# Patient Record
Sex: MALE | Race: OTHER | NOT HISPANIC OR LATINO | URBAN - METROPOLITAN AREA
[De-identification: names, ages, dates, MRNs, and addresses within clinical notes are randomized per-mention and may not be internally consistent; named-entity substitution may affect disease eponyms.]

---

## 2020-01-10 ENCOUNTER — INPATIENT (INPATIENT)
Facility: HOSPITAL | Age: 55
LOS: 7 days | Discharge: HOME | DRG: 234 | End: 2020-01-18
Attending: THORACIC SURGERY (CARDIOTHORACIC VASCULAR SURGERY) | Admitting: SPECIALIST
Payer: COMMERCIAL

## 2020-01-10 ENCOUNTER — TRANSCRIPTION ENCOUNTER (OUTPATIENT)
Age: 55
End: 2020-01-10

## 2020-01-10 VITALS — WEIGHT: 192.02 LBS

## 2020-01-10 DIAGNOSIS — Z98.890 OTHER SPECIFIED POSTPROCEDURAL STATES: Chronic | ICD-10-CM

## 2020-01-10 DIAGNOSIS — Z90.5 ACQUIRED ABSENCE OF KIDNEY: Chronic | ICD-10-CM

## 2020-01-10 DIAGNOSIS — I25.10 ATHEROSCLEROTIC HEART DISEASE OF NATIVE CORONARY ARTERY WITHOUT ANGINA PECTORIS: ICD-10-CM

## 2020-01-10 DIAGNOSIS — I20.0 UNSTABLE ANGINA: ICD-10-CM

## 2020-01-10 DIAGNOSIS — E78.5 HYPERLIPIDEMIA, UNSPECIFIED: ICD-10-CM

## 2020-01-10 DIAGNOSIS — I10 ESSENTIAL (PRIMARY) HYPERTENSION: ICD-10-CM

## 2020-01-10 DIAGNOSIS — I73.9 PERIPHERAL VASCULAR DISEASE, UNSPECIFIED: ICD-10-CM

## 2020-01-10 LAB
ALBUMIN SERPL ELPH-MCNC: 4.2 G/DL — SIGNIFICANT CHANGE UP (ref 3.3–5.2)
ALP SERPL-CCNC: 113 U/L — SIGNIFICANT CHANGE UP (ref 40–120)
ALT FLD-CCNC: 71 U/L — HIGH
ANION GAP SERPL CALC-SCNC: 12 MMOL/L — SIGNIFICANT CHANGE UP (ref 5–17)
ANION GAP SERPL CALC-SCNC: 12 MMOL/L — SIGNIFICANT CHANGE UP (ref 5–17)
APTT BLD: 26.8 SEC — LOW (ref 27.5–36.3)
APTT BLD: 34.7 SEC — SIGNIFICANT CHANGE UP (ref 27.5–36.3)
AST SERPL-CCNC: 42 U/L — HIGH
BASOPHILS # BLD AUTO: 0.06 K/UL — SIGNIFICANT CHANGE UP (ref 0–0.2)
BASOPHILS NFR BLD AUTO: 0.9 % — SIGNIFICANT CHANGE UP (ref 0–2)
BILIRUB DIRECT SERPL-MCNC: 0.1 MG/DL — SIGNIFICANT CHANGE UP (ref 0–0.3)
BILIRUB INDIRECT FLD-MCNC: 0.4 MG/DL — SIGNIFICANT CHANGE UP (ref 0.2–1)
BILIRUB SERPL-MCNC: 0.5 MG/DL — SIGNIFICANT CHANGE UP (ref 0.4–2)
BLD GP AB SCN SERPL QL: SIGNIFICANT CHANGE UP
BUN SERPL-MCNC: 10 MG/DL — SIGNIFICANT CHANGE UP (ref 8–20)
BUN SERPL-MCNC: 11 MG/DL — SIGNIFICANT CHANGE UP (ref 8–20)
CALCIUM SERPL-MCNC: 8.7 MG/DL — SIGNIFICANT CHANGE UP (ref 8.6–10.2)
CALCIUM SERPL-MCNC: 9.3 MG/DL — SIGNIFICANT CHANGE UP (ref 8.6–10.2)
CHLORIDE SERPL-SCNC: 102 MMOL/L — SIGNIFICANT CHANGE UP (ref 98–107)
CHLORIDE SERPL-SCNC: 102 MMOL/L — SIGNIFICANT CHANGE UP (ref 98–107)
CHOLEST SERPL-MCNC: 431 MG/DL — HIGH (ref 110–199)
CO2 SERPL-SCNC: 21 MMOL/L — LOW (ref 22–29)
CO2 SERPL-SCNC: 23 MMOL/L — SIGNIFICANT CHANGE UP (ref 22–29)
CREAT SERPL-MCNC: 0.99 MG/DL — SIGNIFICANT CHANGE UP (ref 0.5–1.3)
CREAT SERPL-MCNC: 1.17 MG/DL — SIGNIFICANT CHANGE UP (ref 0.5–1.3)
EOSINOPHIL # BLD AUTO: 0.07 K/UL — SIGNIFICANT CHANGE UP (ref 0–0.5)
EOSINOPHIL NFR BLD AUTO: 1.1 % — SIGNIFICANT CHANGE UP (ref 0–6)
GLUCOSE BLDC GLUCOMTR-MCNC: 115 MG/DL — HIGH (ref 70–99)
GLUCOSE SERPL-MCNC: 134 MG/DL — HIGH (ref 70–115)
GLUCOSE SERPL-MCNC: 164 MG/DL — HIGH (ref 70–115)
HBA1C BLD-MCNC: 8.6 % — HIGH (ref 4–5.6)
HCT VFR BLD CALC: 43.5 % — SIGNIFICANT CHANGE UP (ref 39–50)
HCT VFR BLD CALC: 45.5 % — SIGNIFICANT CHANGE UP (ref 39–50)
HDLC SERPL-MCNC: 33 MG/DL — LOW
HGB BLD-MCNC: 13.7 G/DL — SIGNIFICANT CHANGE UP (ref 13–17)
HGB BLD-MCNC: 13.9 G/DL — SIGNIFICANT CHANGE UP (ref 13–17)
HIV 1 & 2 AB SERPL IA.RAPID: SIGNIFICANT CHANGE UP
IMM GRANULOCYTES NFR BLD AUTO: 1.1 % — SIGNIFICANT CHANGE UP (ref 0–1.5)
INR BLD: 1 RATIO — SIGNIFICANT CHANGE UP (ref 0.88–1.16)
INR BLD: 1 RATIO — SIGNIFICANT CHANGE UP (ref 0.88–1.16)
LIPID PNL WITH DIRECT LDL SERPL: 342 MG/DL — SIGNIFICANT CHANGE UP
LYMPHOCYTES # BLD AUTO: 2.24 K/UL — SIGNIFICANT CHANGE UP (ref 1–3.3)
LYMPHOCYTES # BLD AUTO: 35.4 % — SIGNIFICANT CHANGE UP (ref 13–44)
MAGNESIUM SERPL-MCNC: 2 MG/DL — SIGNIFICANT CHANGE UP (ref 1.6–2.6)
MAGNESIUM SERPL-MCNC: 2.2 MG/DL — SIGNIFICANT CHANGE UP (ref 1.6–2.6)
MCHC RBC-ENTMCNC: 26.3 PG — LOW (ref 27–34)
MCHC RBC-ENTMCNC: 26.6 PG — LOW (ref 27–34)
MCHC RBC-ENTMCNC: 30.5 GM/DL — LOW (ref 32–36)
MCHC RBC-ENTMCNC: 31.5 GM/DL — LOW (ref 32–36)
MCV RBC AUTO: 84.3 FL — SIGNIFICANT CHANGE UP (ref 80–100)
MCV RBC AUTO: 86.2 FL — SIGNIFICANT CHANGE UP (ref 80–100)
MONOCYTES # BLD AUTO: 0.53 K/UL — SIGNIFICANT CHANGE UP (ref 0–0.9)
MONOCYTES NFR BLD AUTO: 8.4 % — SIGNIFICANT CHANGE UP (ref 2–14)
MRSA PCR RESULT.: SIGNIFICANT CHANGE UP
NEUTROPHILS # BLD AUTO: 3.36 K/UL — SIGNIFICANT CHANGE UP (ref 1.8–7.4)
NEUTROPHILS NFR BLD AUTO: 53.1 % — SIGNIFICANT CHANGE UP (ref 43–77)
NT-PROBNP SERPL-SCNC: 130 PG/ML — SIGNIFICANT CHANGE UP (ref 0–300)
PA ADP PRP-ACNC: 249 PRU — SIGNIFICANT CHANGE UP (ref 180–376)
PHOSPHATE SERPL-MCNC: 2.2 MG/DL — LOW (ref 2.4–4.7)
PLATELET # BLD AUTO: 273 K/UL — SIGNIFICANT CHANGE UP (ref 150–400)
PLATELET # BLD AUTO: 338 K/UL — SIGNIFICANT CHANGE UP (ref 150–400)
POTASSIUM SERPL-MCNC: 3.8 MMOL/L — SIGNIFICANT CHANGE UP (ref 3.5–5.3)
POTASSIUM SERPL-MCNC: 3.8 MMOL/L — SIGNIFICANT CHANGE UP (ref 3.5–5.3)
POTASSIUM SERPL-SCNC: 3.8 MMOL/L — SIGNIFICANT CHANGE UP (ref 3.5–5.3)
POTASSIUM SERPL-SCNC: 3.8 MMOL/L — SIGNIFICANT CHANGE UP (ref 3.5–5.3)
PREALB SERPL-MCNC: 25 MG/DL — SIGNIFICANT CHANGE UP (ref 18–38)
PROT SERPL-MCNC: 7.1 G/DL — SIGNIFICANT CHANGE UP (ref 6.6–8.7)
PROTHROM AB SERPL-ACNC: 11.5 SEC — SIGNIFICANT CHANGE UP (ref 10–12.9)
PROTHROM AB SERPL-ACNC: 11.5 SEC — SIGNIFICANT CHANGE UP (ref 10–12.9)
RBC # BLD: 5.16 M/UL — SIGNIFICANT CHANGE UP (ref 4.2–5.8)
RBC # BLD: 5.28 M/UL — SIGNIFICANT CHANGE UP (ref 4.2–5.8)
RBC # FLD: 13.2 % — SIGNIFICANT CHANGE UP (ref 10.3–14.5)
RBC # FLD: 13.4 % — SIGNIFICANT CHANGE UP (ref 10.3–14.5)
S AUREUS DNA NOSE QL NAA+PROBE: DETECTED
SODIUM SERPL-SCNC: 135 MMOL/L — SIGNIFICANT CHANGE UP (ref 135–145)
SODIUM SERPL-SCNC: 137 MMOL/L — SIGNIFICANT CHANGE UP (ref 135–145)
T3 SERPL-MCNC: 91 NG/DL — SIGNIFICANT CHANGE UP (ref 80–200)
T4 AB SER-ACNC: 7.6 UG/DL — SIGNIFICANT CHANGE UP (ref 4.5–12)
TOTAL CHOLESTEROL/HDL RATIO MEASUREMENT: 13 RATIO — HIGH (ref 3.4–9.6)
TRIGL SERPL-MCNC: 279 MG/DL — HIGH (ref 10–200)
TSH SERPL-MCNC: 1.12 UIU/ML — SIGNIFICANT CHANGE UP (ref 0.27–4.2)
WBC # BLD: 6.18 K/UL — SIGNIFICANT CHANGE UP (ref 3.8–10.5)
WBC # BLD: 6.33 K/UL — SIGNIFICANT CHANGE UP (ref 3.8–10.5)
WBC # FLD AUTO: 6.18 K/UL — SIGNIFICANT CHANGE UP (ref 3.8–10.5)
WBC # FLD AUTO: 6.33 K/UL — SIGNIFICANT CHANGE UP (ref 3.8–10.5)

## 2020-01-10 PROCEDURE — 93010 ELECTROCARDIOGRAM REPORT: CPT

## 2020-01-10 PROCEDURE — 99222 1ST HOSP IP/OBS MODERATE 55: CPT

## 2020-01-10 PROCEDURE — 93306 TTE W/DOPPLER COMPLETE: CPT | Mod: 26

## 2020-01-10 PROCEDURE — 93880 EXTRACRANIAL BILAT STUDY: CPT | Mod: 26

## 2020-01-10 RX ORDER — PANTOPRAZOLE SODIUM 20 MG/1
40 TABLET, DELAYED RELEASE ORAL
Refills: 0 | Status: DISCONTINUED | OUTPATIENT
Start: 2020-01-10 | End: 2020-01-14

## 2020-01-10 RX ORDER — ONDANSETRON 8 MG/1
4 TABLET, FILM COATED ORAL EVERY 8 HOURS
Refills: 0 | Status: DISCONTINUED | OUTPATIENT
Start: 2020-01-10 | End: 2020-01-14

## 2020-01-10 RX ORDER — SODIUM CHLORIDE 9 MG/ML
300 INJECTION INTRAMUSCULAR; INTRAVENOUS; SUBCUTANEOUS
Refills: 0 | Status: DISCONTINUED | OUTPATIENT
Start: 2020-01-10 | End: 2020-01-10

## 2020-01-10 RX ORDER — ASPIRIN/CALCIUM CARB/MAGNESIUM 324 MG
81 TABLET ORAL DAILY
Refills: 0 | Status: DISCONTINUED | OUTPATIENT
Start: 2020-01-10 | End: 2020-01-14

## 2020-01-10 RX ORDER — EZETIMIBE 10 MG/1
10 TABLET ORAL DAILY
Refills: 0 | Status: DISCONTINUED | OUTPATIENT
Start: 2020-01-10 | End: 2020-01-14

## 2020-01-10 RX ORDER — ACETAMINOPHEN 500 MG
650 TABLET ORAL EVERY 6 HOURS
Refills: 0 | Status: DISCONTINUED | OUTPATIENT
Start: 2020-01-10 | End: 2020-01-14

## 2020-01-10 RX ORDER — SODIUM CHLORIDE 9 MG/ML
500 INJECTION INTRAMUSCULAR; INTRAVENOUS; SUBCUTANEOUS
Refills: 0 | Status: DISCONTINUED | OUTPATIENT
Start: 2020-01-10 | End: 2020-01-12

## 2020-01-10 RX ORDER — ATORVASTATIN CALCIUM 80 MG/1
40 TABLET, FILM COATED ORAL AT BEDTIME
Refills: 0 | Status: DISCONTINUED | OUTPATIENT
Start: 2020-01-10 | End: 2020-01-10

## 2020-01-10 RX ORDER — MUPIROCIN 20 MG/G
1 OINTMENT TOPICAL
Refills: 0 | Status: DISCONTINUED | OUTPATIENT
Start: 2020-01-10 | End: 2020-01-14

## 2020-01-10 RX ORDER — METOPROLOL TARTRATE 50 MG
25 TABLET ORAL EVERY 12 HOURS
Refills: 0 | Status: DISCONTINUED | OUTPATIENT
Start: 2020-01-10 | End: 2020-01-14

## 2020-01-10 RX ORDER — CHLORHEXIDINE GLUCONATE 213 G/1000ML
1 SOLUTION TOPICAL
Refills: 0 | Status: DISCONTINUED | OUTPATIENT
Start: 2020-01-11 | End: 2020-01-14

## 2020-01-10 RX ORDER — CHLORHEXIDINE GLUCONATE 213 G/1000ML
15 SOLUTION TOPICAL
Refills: 0 | Status: DISCONTINUED | OUTPATIENT
Start: 2020-01-10 | End: 2020-01-13

## 2020-01-10 RX ADMIN — MUPIROCIN 1 APPLICATION(S): 20 OINTMENT TOPICAL at 22:25

## 2020-01-10 RX ADMIN — Medication 81 MILLIGRAM(S): at 13:05

## 2020-01-10 RX ADMIN — CHLORHEXIDINE GLUCONATE 15 MILLILITER(S): 213 SOLUTION TOPICAL at 22:25

## 2020-01-10 RX ADMIN — Medication 25 MILLIGRAM(S): at 18:07

## 2020-01-10 NOTE — PROGRESS NOTE ADULT - SUBJECTIVE AND OBJECTIVE BOX
s/p [facility] RFA #5  VENTRICLES: There were no left ventricular global or regional wall motion  abnormalities. EF calculated by contrast ventriculography was 60 %.  CORONARY VESSELS: The coronary circulation is right dominant.  LM:   --  LM: Angiography showed minor luminal irregularities with no flow  limiting lesions.  LAD:   --  Mid LAD: The distal vessel was supplied by extensive collaterals  from the third obtuse marginal. There was a 100 % stenosis.  --  D1: There was a discrete 90 % stenosis at the ostium of the vessel  segment. The lesion was hazy, complex, and eccentric. There was ANDRY grade  3 flow through the vessel (brisk flow).  CX:   --  OM2: The vessel was large sized. There was a tubular 100 %  stenosis in the proximal third of the vessel segment. There was ANDRY grade  0 flow through the vessel (no flow).  RCA:   --  Proximal RCA: There was a tubular 99 % stenosis. The lesion was  hazy, complex,and eccentric. There was ANDRY grade 1 flow through the  vessel (slow flow without perfusion).  --  RPDA: The distal vessel was supplied by extensive collaterals from the  third obtuse marginal.  ARCH VESSELS: LIMA: Normal.  COMPLICATIONS: No complications occurred during the cath lab visit.  DIAGNOSTIC IMPRESSIONS: Unstable angina with minimal exertion (CCS cl III)  Severe native three vessel CAD with preserved LV function  The LIMA is widely patent  DIAGNOSTIC RECOMMENDATIONS: Admit to Dr Ann for 4V CABG (LAD,D1, OM2,  RPDA)  Gentle hydration  Agrressive antihypertensive (BB, ACEI) and lipid-lowering therapy  (atorvastatin 80 mg daily)  Will follow  INTERVENTIONAL IMPRESSIONS: Unstable angina with minimal exertion (CCS cl  III)  Severenative three vessel CAD with preserved LV function  The LIMA is widely patent  INTERVENTIONAL RECOMMENDATIONS: Admit to Dr Ann for 4V CABG (LAD,D1,  OM2, RPDA)  Gentle hydration  Agrressive antihypertensive (BB, ACEI) and lipid-lowering therapy  (atorvastatin 80 mg daily)            REVIEW OF SYSTEMS:  Denies SOB, CP, NV, HA, dizziness, palpitations, site pain    PHYSICAL EXAM: A&Ox3 NAD Skin warm and dry  NEURO: Speech intact +gag +swallow Tongue midline LUTHER  NECK: No JVD, trachea midline. Eupneic  HEART: RRR S1S2 no g/m SR on tele  PULMONARY:  CTA phuong  ABDOMEN: Soft nontender X4 +BS Vdg/eating  EXTREMITIES: RFA site: No bleed, hematoma, pain, ecchymosis or swelling Rt DP/PT+

## 2020-01-10 NOTE — H&P PST ADULT - NEGATIVE NEUROLOGICAL SYMPTOMS
no syncope/no headache/no facial palsy/no paresthesias/no difficulty walking/no confusion/no transient paralysis/no tremors/no vertigo/no loss of sensation/no weakness/no generalized seizures/no focal seizures/no loss of consciousness/no hemiparesis

## 2020-01-10 NOTE — CONSULT NOTE ADULT - SUBJECTIVE AND OBJECTIVE BOX
Surgeon: Marissa     Consult requesting by: Latha     HISTORY OF PRESENT ILLNESS:  55 y/o male with recent intermittent chest pain with exertion, and chest heaviness resolving with rest.  Saw his PCP whom he had not seen x10 years who then referred him to cardiologist.    States daily compliance with aspirin 81 +FH CAD premature, HTN, HLD, non compliance, angina, former smoker/s/p nephrectomy for donor status  mg po.      PAST MEDICAL & SURGICAL HISTORY:  Statin intolerance: elevated liver enzymes  Carotid disease, bilateral: mild  HLD (hyperlipidemia)  HTN (hypertension)  Non-compliance: with medical care  AP (angina pectoris)  Obesity  H/O elbow surgery: right  S/p nephrectomy: left side for donor status      MEDICATIONS  (STANDING):  aspirin  chewable 81 milliGRAM(s) Oral daily  chlorhexidine 0.12% Liquid 15 milliLiter(s) Swish and Spit two times a day  ezetimibe 10 milliGRAM(s) Oral daily  metoprolol tartrate 25 milliGRAM(s) Oral every 12 hours  mupirocin 2% Ointment 1 Application(s) Topical two times a day  pantoprazole    Tablet 40 milliGRAM(s) Oral before breakfast  sodium chloride 0.9%. 500 milliLiter(s) (100 mL/Hr) IV Continuous <Continuous>    MEDICATIONS  (PRN):  acetaminophen   Tablet .. 650 milliGRAM(s) Oral every 6 hours PRN Mild Pain (1 - 3)  aluminum hydroxide/magnesium hydroxide/simethicone Suspension 30 milliLiter(s) Oral every 4 hours PRN Dyspepsia  ondansetron Injectable 4 milliGRAM(s) IV Push every 8 hours PRN Nausea and/or Vomiting    Antiplatelet therapy:     ASA                        Last dose/amt:    Allergies    No Known Allergies    Intolerances        SOCIAL HISTORY:  Smoker: [ ] Yes  [x ] No        PACK YEARS:                         WHEN QUIT?  ETOH use: [ ] Yes  [ x] No              FREQUENCY / QUANTITY:  Ilicit Drug use:  [ ] Yes  [ x] No  Occupation:   Live with: lives with wife   Assisted device use: no    FAMILY HISTORY:      Review of Systems  CONSTITUTIONAL:  Fevers[ ] chills[ ] sweats[ ] fatigue[ ] weight loss[ ] weight gain [ ]                                     NEGATIVE [ x]   NEURO:  parathesias[ ] seizures [ ]  syncope [ ]  confusion [ ]                                                                                NEGATIVE[x ]   EYES: glasses[ ]  blurry vision[ ]  discharge[ ] pain[ ] glaucoma [ ]                                                                          NEGATIVE[x ]   ENMT:  difficulty hearing [ ]  vertigo[ ]  dysphagia[ ] epistaxis[ ] recent dental work [ ]                                    NEGATIVE[ x]   CV:  chest pain[ ] palpitations[ ] DIETRICH [ ] diaphoresis [ ]                                                                                           NEGATIVEx[ ]   RESPIRATORY:  wheezing[ ] SOB[ ] cough [ ] sputum[ ] hemoptysis[ ]                                                                  NEGATIVE[x ]   GI:  nausea[ ]  vommiting [ ]  diarrhea[ ] constipation [ ] melena [ ]                                                                      NEGATIVE[x ]   : hematuria[ ]  dysuria[ ] urgency[ ] incontinence[ ]                                                                                            NEGATIVE[ x]   MUSKULOSKELETAL:  arthritis[ ]  joint swelling [ ] muscle weakness [ ]                                                                NEGATIVE[ x]   SKIN/BREAST:  rash[ ] itching [ ]  hair loss[ ] masses[ ]                                                                                              NEGATIVE[x ]   PSYCH:  dementia [ ] depresion [ ] anxiety[ ]                                                                                                               NEGATIVE[x ]   HEME/LYMPH:  bruises easily[ ] enlarged lymph nodes[ ] tender lymph nodes[ ]                                               NEGATIVE[x ]   ENDOCRINE:  cold intolerance[ ] heat intolerance[ ] polydipsia[ ]                                                                          NEGATIVE[ x]     PHYSICAL EXAM  Vital Signs Last 24 Hrs  T(C): 36.7 (10 Jama 2020 10:53), Max: 36.7 (10 Jama 2020 10:53)  T(F): 98 (10 Jama 2020 10:53), Max: 98 (10 Jama 2020 10:53)  HR: 84 (10 Jama 2020 18:30) (67 - 84)  BP: 127/87 (10 Jama 2020 18:30) (126/74 - 158/99)  BP(mean): --  RR: 16 (10 Jama 2020 18:30) (15 - 17)  SpO2: 96% (10 Jama 2020 18:30) (96% - 100%)    CONSTITUTIONAL:                                                                          WNL[x ]   Neuro: WNL[x ] Normal exam oriented to person/place & time with no focal motor or sensory  deficits. Other                     Eyes: WNL[x ]   Normal exam of conjunctiva & lids, pupils equally reactive. Other     ENT: WNL[x ]    Normal exam of nasal/oral mucosa with absence of cyanosis. Other  Neck: WNL[x ]  Normal exam of jugular veins, trachea & thyroid. Other  Chest: WNL[x ] Normal lung exam with good air movement absence of wheezes, rales, or rhonchi: Other                                                                                CV:  Auscultation: normal [ x] S3[ ] S4[ ] Irregular [ ] Rub[ ] Clicks[ ]    Murmurs none:[x ]systolic [ ]  diastolic [ ] holosystolic [ ]  Carotids: No Bruits[ x] Other                 Abdominal Aorta: normal [ ] nonpalpable[ ]Other                                                                                      GI:           WNL[x ] Normal exam of abdomen, liver & spleen with no noted masses or tenderness. Other                                                                                                        Extremities: WNL[x ] Normal no evidence of cyanosis or deformity Edema: none[ ]trace[ ]1+[ ]2+[ ]3+[ ]4+[ ]  Lower Extremity Pulses: Right[2+ ] Left[2+ ]Varicosities[ ]  SKIN :WNL[x ] Normal exam to inspection & palation. Other:                                                          LABS:                        13.7   6.33  )-----------( 273      ( 10 Jama 2020 13:33 )             43.5     01-10    135  |  102  |  10.0  ----------------------------<  134<H>  3.8   |  21.0<L>  |  0.99    Ca    8.7      10 Jama 2020 13:33  Phos  2.2     01-10  Mg     2.0     01-10    TPro  7.1  /  Alb  4.2  /  TBili  0.5  /  DBili  0.1  /  AST  42<H>  /  ALT  71<H>  /  AlkPhos  113  01-10    PT/INR - ( 10 Jama 2020 13:33 )   PT: 11.5 sec;   INR: 1.00 ratio         PTT - ( 10 Jama 2020 13:33 )  PTT:26.8 sec            Cardiac Cath: < from: Cardiac Cath Lab - Adult (01.10.20 @ 11:59) >  VENTRICLES: There were no left ventricular global or regional wall motion  abnormalities. EF calculated by contrast ventriculography was 60 %.  CORONARY VESSELS: The coronary circulation is right dominant.  LM:   --  LM: Angiography showed minor luminal irregularities with no flow  limiting lesions.  LAD:   --  Mid LAD: The distal vessel was supplied by extensive collaterals  from the third obtuse marginal. There was a 100 % stenosis.  --  D1: There was a discrete 90 % stenosis at the ostium of the vessel  segment. The lesion was hazy, complex, and eccentric. There was ANDRY grade  3 flow through the vessel (brisk flow).  CX:   --  OM2: The vessel was large sized. There was a tubular 100 %  stenosis in the proximal third of the vessel segment. There was ANDRY grade  0 flow through the vessel (no flow).  RCA:   --  Proximal RCA: There was a tubular 99 % stenosis. The lesion was  hazy, complex,and eccentric. There was ANDRY grade 1 flow through the  vessel (slow flow without perfusion).  --  RPDA: The distal vessel was supplied by extensive collaterals from the  third obtuse marginal.  ARCH VESSELS: LIMA: Normal.  COMPLICATIONS: No complications occurred during the cath lab visit.    < end of copied text >      TTE / BLAIRE: Pending

## 2020-01-10 NOTE — H&P PST ADULT - ASSESSMENT
LHC planned  Stressed importance to pt need for DAP, statin etc if PCI performed and for his compliance with same and to please discuss same with Dr Pinto  Spouse present for discharge  NPO> 8 hours   ml x1 hour for unilateral kidney  All questions/concerns addressed

## 2020-01-10 NOTE — H&P PST ADULT - HISTORY OF PRESENT ILLNESS
53 y/o male with recent intermittent chest pain with exertion, and chest heaviness resolving with rest    Co Morbidities:  +FH CAD premature, HTN, HLD, non compliance, angina, former smoker      ANGINAL/ISCHEMIC SS: Class III    ECHO: (WITHIN 6 MONTHS) Dec 2019                                                NON INVASIVE TESTING: not rendered    LHC/PCI/CABG:    ANTIANGINAL MEDICATIONS:    ASA  MALLAMPATI  BR 55 y/o male with recent intermittent chest pain with exertion, and chest heaviness resolving with rest    Co Morbidities:  +FH CAD premature, HTN, HLD, non compliance, angina, former smoker/s/p nephrectomy for donor status      ANGINAL/ISCHEMIC SS: Class III    ECHO: (WITHIN 6 MONTHS) Dec 2019                                                NON INVASIVE TESTING: not rendered    LHC/PCI/CABG:    ANTIANGINAL MEDICATIONS:    ASA 2  MALLAMPATI 2  BR HPI: 55 y/o male with recent intermittent chest pain with exertion, and chest heaviness resolving with rest.  Saw his PCP whom he had not seen x10 years who then referred him to cardiologist.     States daily compliance with aspirin 81 mg po.      ROS: Denies cough, palpitation, DIETRICH, lightheadedness, or near syncope/syncope    Co Morbidities:  +FH CAD premature, HTN, HLD, non compliance, angina, former smoker/s/p nephrectomy for donor status      ANGINAL/ISCHEMIC SS: Class III    ECHO: (WITHIN 6 MONTHS) Dec 2019                                                NON INVASIVE TESTING: not rendered    LHC: 2006 prior to nephrectomy    ANTIANGINAL MEDICATIONS: None    ASA 2  MALLAMPATI 2  BR HPI: 55 y/o male with recent intermittent chest pain with exertion, and chest heaviness resolving with rest.  Saw his PCP whom he had not seen x10 years who then referred him to cardiologist.     States daily compliance with aspirin 81 mg po.      ROS: Denies cough, palpitation, DIETRICH, lightheadedness, or near syncope/syncope    Co Morbidities:  +FH CAD premature, HTN, HLD, non compliance, angina, former smoker/s/p nephrectomy for donor status      ANGINAL/ISCHEMIC SS: Class III    ECHO: (WITHIN 6 MONTHS) Dec 2019                                                NON INVASIVE TESTING: not rendered    LHC: 2006 prior to nephrectomy    ANTIANGINAL MEDICATIONS: None    ASA 2  MALLAMPATI 2  BR 0.8% HPI: 55 y/o male with recent intermittent chest pain with exertion, and chest heaviness resolving with rest.  Saw his PCP whom he had not seen x10 years who then referred him to cardiologist.     States daily compliance with aspirin 81 mg po.      ROS: Denies cough, palpitation, DIETRICH, lightheadedness, or near syncope/syncope    Co Morbidities:  +FH CAD premature, HTN, HLD, non compliance, angina, former smoker, /s/p nephrectomy for donor status      ANGINAL/ISCHEMIC SS: Class III    ECHO: (WITHIN 6 MONTHS) Dec 2019                                                NON INVASIVE TESTING: not rendered    LHC: 2006 prior to nephrectomy    ANTIANGINAL MEDICATIONS: None    ASA 2  MALLAMPATI 2  BR 0.8%    Attending physical exam:    GENERAL: Well-developed, well-nourished patient in no acute distress.  HEENT: Normocephalic, atraumatic, extraocular muscles intact, PERRLA, anicteric sclera, conjunctiva without injection  Neck: Supple, no carotid bruits bilaterally, no JVD  Chest: Clear to auscultation bilaterally without wheezes or rhonchi, normal I:E ratio  Cardiac: Regular rate and rhythm S1-S2 without gallops murmurs or rubs  Abdomen: The abdomen is scaphoid, soft, nontender and nondistended.  Positive bowel sounds.  There is no hepatosplenomegaly.  There are no masses or fascial defects appreciated.  Extremities: The extremities are warm and well-perfused.  There is no cyanosis clubbing or edema  Vascular: Carotid, radial, and femoral pulses are 2-3+ bilaterally.  Dorsalis pedis and posterior tibial artery pulses are 1-2+ bilaterally.  no peripheral varicosities.  Neuro: The patient is neurologically intact.  There are no gross motor deficits.  Gait not assessed  Psychiatric: The patient is alert and oriented X3, mood and affect are appropriate  Skin: Without rashes

## 2020-01-10 NOTE — DISCHARGE NOTE PROVIDER - NSDCFUADDAPPT_GEN_ALL_CORE_FT
CLAIRE Velazquez 1 week CLAIRE Velazquez 1 week  cardiac rehab info provided/referral and communication to cardiac rehab completed

## 2020-01-10 NOTE — H&P PST ADULT - NSICDXPASTMEDICALHX_GEN_ALL_CORE_FT
PAST MEDICAL HISTORY:  AP (angina pectoris)     HLD (hyperlipidemia)     HTN (hypertension)     Non-compliance with medical care    Obesity PAST MEDICAL HISTORY:  AP (angina pectoris)     Carotid disease, bilateral mild    HLD (hyperlipidemia)     HTN (hypertension)     Non-compliance with medical care    Obesity PAST MEDICAL HISTORY:  AP (angina pectoris)     Carotid disease, bilateral mild    HLD (hyperlipidemia)     HTN (hypertension)     Non-compliance with medical care    Obesity     Statin intolerance elevated liver enzymes

## 2020-01-10 NOTE — H&P PST ADULT - NSICDXPASTSURGICALHX_GEN_ALL_CORE_FT
PAST SURGICAL HISTORY:  S/p nephrectomy left side for donor status PAST SURGICAL HISTORY:  H/O elbow surgery right    S/p nephrectomy left side for donor status

## 2020-01-10 NOTE — ASU PATIENT PROFILE, ADULT - PMH
AP (angina pectoris)    HLD (hyperlipidemia)    HTN (hypertension)    Non-compliance  with medical care  Obesity

## 2020-01-11 DIAGNOSIS — E11.9 TYPE 2 DIABETES MELLITUS WITHOUT COMPLICATIONS: ICD-10-CM

## 2020-01-11 DIAGNOSIS — Z90.5 ACQUIRED ABSENCE OF KIDNEY: ICD-10-CM

## 2020-01-11 LAB
APPEARANCE UR: CLEAR — SIGNIFICANT CHANGE UP
BACTERIA # UR AUTO: ABNORMAL
BILIRUB UR-MCNC: NEGATIVE — SIGNIFICANT CHANGE UP
COLOR SPEC: YELLOW — SIGNIFICANT CHANGE UP
DIFF PNL FLD: ABNORMAL
EPI CELLS # UR: SIGNIFICANT CHANGE UP
GLUCOSE BLDC GLUCOMTR-MCNC: 191 MG/DL — HIGH (ref 70–99)
GLUCOSE UR QL: 250 MG/DL
HCV AB S/CO SERPL IA: 0.06 S/CO — SIGNIFICANT CHANGE UP (ref 0–0.99)
HCV AB SERPL-IMP: SIGNIFICANT CHANGE UP
KETONES UR-MCNC: ABNORMAL
LEUKOCYTE ESTERASE UR-ACNC: NEGATIVE — SIGNIFICANT CHANGE UP
NITRITE UR-MCNC: NEGATIVE — SIGNIFICANT CHANGE UP
PH UR: 6 — SIGNIFICANT CHANGE UP (ref 5–8)
PROT UR-MCNC: NEGATIVE MG/DL — SIGNIFICANT CHANGE UP
RBC CASTS # UR COMP ASSIST: SIGNIFICANT CHANGE UP /HPF (ref 0–4)
SP GR SPEC: 1.02 — SIGNIFICANT CHANGE UP (ref 1.01–1.02)
UROBILINOGEN FLD QL: NEGATIVE MG/DL — SIGNIFICANT CHANGE UP
WBC UR QL: SIGNIFICANT CHANGE UP

## 2020-01-11 PROCEDURE — 99232 SBSQ HOSP IP/OBS MODERATE 35: CPT

## 2020-01-11 PROCEDURE — 93922 UPR/L XTREMITY ART 2 LEVELS: CPT | Mod: 26

## 2020-01-11 PROCEDURE — 71045 X-RAY EXAM CHEST 1 VIEW: CPT | Mod: 26

## 2020-01-11 RX ORDER — DEXTROSE 50 % IN WATER 50 %
12.5 SYRINGE (ML) INTRAVENOUS ONCE
Refills: 0 | Status: DISCONTINUED | OUTPATIENT
Start: 2020-01-11 | End: 2020-01-12

## 2020-01-11 RX ORDER — DEXTROSE 50 % IN WATER 50 %
25 SYRINGE (ML) INTRAVENOUS ONCE
Refills: 0 | Status: DISCONTINUED | OUTPATIENT
Start: 2020-01-11 | End: 2020-01-12

## 2020-01-11 RX ORDER — INSULIN LISPRO 100/ML
VIAL (ML) SUBCUTANEOUS AT BEDTIME
Refills: 0 | Status: DISCONTINUED | OUTPATIENT
Start: 2020-01-11 | End: 2020-01-14

## 2020-01-11 RX ORDER — GLUCAGON INJECTION, SOLUTION 0.5 MG/.1ML
1 INJECTION, SOLUTION SUBCUTANEOUS ONCE
Refills: 0 | Status: DISCONTINUED | OUTPATIENT
Start: 2020-01-11 | End: 2020-01-12

## 2020-01-11 RX ORDER — INSULIN LISPRO 100/ML
VIAL (ML) SUBCUTANEOUS
Refills: 0 | Status: DISCONTINUED | OUTPATIENT
Start: 2020-01-11 | End: 2020-01-14

## 2020-01-11 RX ORDER — DEXTROSE 50 % IN WATER 50 %
15 SYRINGE (ML) INTRAVENOUS ONCE
Refills: 0 | Status: DISCONTINUED | OUTPATIENT
Start: 2020-01-11 | End: 2020-01-12

## 2020-01-11 RX ORDER — SODIUM CHLORIDE 9 MG/ML
1000 INJECTION, SOLUTION INTRAVENOUS
Refills: 0 | Status: DISCONTINUED | OUTPATIENT
Start: 2020-01-11 | End: 2020-01-12

## 2020-01-11 RX ADMIN — CHLORHEXIDINE GLUCONATE 1 APPLICATION(S): 213 SOLUTION TOPICAL at 10:20

## 2020-01-11 RX ADMIN — Medication 25 MILLIGRAM(S): at 05:38

## 2020-01-11 RX ADMIN — CHLORHEXIDINE GLUCONATE 15 MILLILITER(S): 213 SOLUTION TOPICAL at 18:07

## 2020-01-11 RX ADMIN — CHLORHEXIDINE GLUCONATE 15 MILLILITER(S): 213 SOLUTION TOPICAL at 05:39

## 2020-01-11 RX ADMIN — PANTOPRAZOLE SODIUM 40 MILLIGRAM(S): 20 TABLET, DELAYED RELEASE ORAL at 05:38

## 2020-01-11 RX ADMIN — MUPIROCIN 1 APPLICATION(S): 20 OINTMENT TOPICAL at 05:39

## 2020-01-11 RX ADMIN — MUPIROCIN 1 APPLICATION(S): 20 OINTMENT TOPICAL at 18:02

## 2020-01-11 RX ADMIN — Medication 25 MILLIGRAM(S): at 18:02

## 2020-01-11 RX ADMIN — EZETIMIBE 10 MILLIGRAM(S): 10 TABLET ORAL at 18:02

## 2020-01-11 RX ADMIN — CHLORHEXIDINE GLUCONATE 1 APPLICATION(S): 213 SOLUTION TOPICAL at 18:08

## 2020-01-11 RX ADMIN — Medication 81 MILLIGRAM(S): at 18:02

## 2020-01-11 NOTE — PROGRESS NOTE ADULT - SUBJECTIVE AND OBJECTIVE BOX
HPI:  HPI: 55 y/o male with recent intermittent chest pain with exertion, and chest heaviness resolving with rest.  Saw his PCP whom he had not seen x10 years who then referred him to cardiologist.     HX:   +FH CAD premature, HTN, HLD, non compliance, angina, former smoker/s/p nephrectomy for donor status  ANGINAL/ISCHEMIC SS: Class III  ECHO: (WITHIN 6 MONTHS) Dec 2019  LHC: 2006 prior to nephrectomy  HAD C yesterday:  TVD with preserved LV function    [facility]  Jenny Recinos MD  INDICATIONS: Unstable angina - CCS3.  HISTORY: There was no prior cardiac history. The patient has hypertension,  untreated dyslipidemia, and a former history of cigarette use. History of  prior nephrectomy (he donated a kidney to his mother-in-law in ).  PROCEDURE:  --  Left coronary angiography.  --  Right coronary angiography.  --  Native Mammary Injection.  --  Left heart catheterization with ventriculography.  TECHNIQUE: The risks and alternatives of the procedures and conscious  sedation were explained to the patient and informed consent was obtained.  Cardiac catheterization performed electively.  Local anesthetic given. Right femoral artery access. A 5FR SHEATH 10CM  PINNACLE was inserted in the vessel. Left coronary artery angiography. The  vessel was injected utilizing a 5F FL4 IMPULSE catheter. Right coronary  artery angiography. The vessel was injected utilizing a 5FR FR4 IMPULSE  catheter. Native Mammary Injection (using 5FR FR4 IMPULSE). Left heart  catheterization. Ventriculography was performed. A 5FR ANG PIG IMPULSE  catheter was utilized. RADIATION EXPOSURE: 2.3 min.  CONTRAST GIVEN: Omnipaque 56 ml.  MEDICATIONS GIVEN: Midazolam, 1 mg, IV. Fentanyl, 50 mcg, IV. 1% Lidocaine,  10 ml, subcutaneously.  VENTRICLES: There were no left ventricular global or regional wall motion  abnormalities. EF calculated by contrast ventriculography was 60 %.  CORONARY VESSELS: The coronary circulation is right dominant.  LM:   --  LM: Angiography showed minor luminal irregularities with no flow  limiting lesions.  LAD:   --  Mid LAD: The distal vessel was supplied by extensive collaterals  from the third obtuse marginal. There was a 100 % stenosis.  --  D1: There was a discrete 90 % stenosis at the ostium of the vessel  segment. The lesion was hazy, complex, and eccentric. There was ANDRY grade  3 flow through the vessel (brisk flow).  CX:   --  OM2: The vessel was large sized. There was a tubular 100 %  stenosis in the proximal third of the vessel segment. There was ANDRY grade  0 flow through the vessel (no flow).  RCA:   --  Proximal RCA: There was a tubular 99 % stenosis. The lesion was  hazy, complex, and eccentric. There was ANDRY grade 1 flow through the  vessel (slow flow without perfusion).  --  RPDA: The distal vessel was supplied by extensive collaterals from the  third obtuse marginal.  ARCH VESSELS: LIMA: Normal.  COMPLICATIONS: No complications occurred during the cath lab visit.  DIAGNOSTIC IMPRESSIONS: Unstable angina with minimal exertion (CCS cl III)  Severe native three vessel CAD with preserved LV function  The LIMA is widely patent  DIAGNOSTIC RECOMMENDATIONS: Admit to Dr Ann for 4V CABG (LAD,D1, OM2,  RPDA)  Gentle hydration  Agrressive antihypertensive (BB, ACEI) and lipid-lowering therapy  (atorvastatin 80 mg daily)  Will follow  INTERVENTIONAL IMPRESSIONS: Unstable angina with minimal exertion (CCS cl  III)  Severe native three vessel CAD with preserved LV function  The LIMA is widely patent  INTERVENTIONAL RECOMMENDATIONS: Admit to Dr Ann for 4V CABG (LAD,D1,  OM2, RPDA)  Gentle hydration  Agrressive antihypertensive (BB, ACEI) and lipid-lowering therapy  (atorvastatin 80 mg daily)      PMH  Unstable angina pectoris  Statin intolerance  Carotid disease, bilateral  HLD (hyperlipidemia)  HTN (hypertension  Obesity  Coronary artery disease involving native coronary artery of native heart without angina pectoris    REVIEW OF SYSTEMS  General: Denies fever, chills, pain, no discomfort  Respiratory and Thorax:  Denies cough, sob, or any discomfort  Cardiovascular:  Denies chest pain, palpitations or any discomfort	  Gastrointestinal:  Denies n/v/d, constipation, or any discomfort  Genitourinary:  Denies frequency, burning, or pain  Musculoskeletal:  Denies joint pain, swelling, or any discomfort   Neurological:  Denies headache, dizziness blurred vision, numbing or tingling  Psychiatric:  Denies sadness or depression  Hematology  José Luis bleeding o swelling  	  MEDICATIONS:  metoprolol tartrate 25 milliGRAM(s) Oral every 12 hours  acetaminophen   Tablet .. 650 milliGRAM(s) Oral every 6 hours PRN  ondansetron Injectable 4 milliGRAM(s) IV Push every 8 hours PRN  aluminum hydroxide/magnesium hydroxide/simethicone Suspension 30 milliLiter(s) Oral every 4 hours PRN  pantoprazole    Tablet 40 milliGRAM(s) Oral before breakfast  ezetimibe 10 milliGRAM(s) Oral daily  aspirin  chewable 81 milliGRAM(s) Oral daily  chlorhexidine 0.12% Liquid 15 milliLiter(s) Swish and Spit two times a day  chlorhexidine 4% Liquid 1 Application(s) Topical two times a day  mupirocin 2% Ointment 1 Application(s) Topical two times a day  sodium chloride 0.9%. 500 milliLiter(s) IV Continuous <Continuous>    PHYSICAL EXAM:  T(C): 36.5 (20 @ 05:14), Max: 37 (01-10-20 @ 19:15)  HR: 79 (20 @ 05:14) (67 - 84)  BP: 110/67 (20 @ 05:14) (110/67 - 158/99)  RR: 18 (20 @ 05:14) (15 - 19)  SpO2: 97% (20 @ 05:14) (96% - 100%)  I&O's Summary  10 Jama 2020 07:01  -  2020 07:00  --------------------------------------------------------  IN: 480 mL / OUT: 0 mL / NET: 480 mL  Daily Weight in k.3 (2020 06:36)  Appearance: Normal	  HEENT:   Normal oral mucosa, PERRL, EOMI	  Lymphatic: No lymphadenopathy  Cardiovascular: Normal No edema  Respiratory:  RR wnl  Psychiatry: A & O x 3, Mood & affect appropriate  Gastrointestinal:  Soft, Non-tender, + BS	  Skin: No rashes, No ecchymoses, No cyanosis  Neurologic: Non-focal  Extremities: Normal range of motion, No clubbing, cyanosis or edema  Vascular: Peripheral pulses palpable 2+ bilaterally  Procedure Site:  Right groin:  No bleeding, no pain, no bruising, no hematoma, soft, +PP, and no edema.        TELEMETRY: 	  SR no acute alarms noted.                          13.7   6.33  )-----------( 273      ( 10 Jama 2020 13:33 )             43.5   01-10  135  |  102  |  10.0  ----------------------------<  134<H>  3.8   |  21.0<L>  |  0.99  Ca    8.7      10 Jama 2020 13:33  Phos  2.2     01-10  Mg     2.0     01-10  TPro  7.1  /  Alb  4.2  /  TBili  0.5  /  DBili  0.1  /  AST  42<H>  /  ALT  71<H>  /  AlkPhos  113  01-10  proBNP: Serum Pro-Brain Natriuretic Peptide: 130 pg/mL (01-10 @ 13:33)  HgA1c: Hemoglobin A1C, Whole Blood: 8.6 % (01-10 @ 12:33)

## 2020-01-11 NOTE — PROGRESS NOTE ADULT - ASSESSMENT
This is a 55 y/o M history of carotid disease, HLD, HTN, donor nephrectomy and a strong family history of coronary artery disease presented with chest pain and SOB on exertion sent for cath by cardiologist found to have MVD. This is a 55 y/o M history of carotid disease, HLD, HTN, donor nephrectomy and a strong family history of coronary artery disease presented with chest pain and SOB on exertion sent for cath by cardiologist found to have MVD. Apparently new diagnosis of diabetes (A1C 8.6)

## 2020-01-11 NOTE — PROGRESS NOTE ADULT - SUBJECTIVE AND OBJECTIVE BOX
[facility], [facility]                                                    [address], [address], [place] [address]                                                         PHONE: [phone]    FAX: [phone] [address], [address], [place] [address]                                                 PHONE: [phone]    FAX: [phone]  *******************************************************************************    Reason for Consult: Pre-operative cardiac risk stratification    HPI:  MARGOT JESUS is a 54y Male    PAST MEDICAL & SURGICAL HISTORY:  Statin intolerance: elevated liver enzymes  Carotid disease, bilateral: mild  HLD (hyperlipidemia)  HTN (hypertension)  Non-compliance: with medical care  AP (angina pectoris)  Obesity  H/O elbow surgery: right  S/p nephrectomy: left side for donor status      No Known Allergies      MEDICATIONS  (STANDING):  aspirin  chewable 81 milliGRAM(s) Oral daily  chlorhexidine 0.12% Liquid 15 milliLiter(s) Swish and Spit two times a day  chlorhexidine 4% Liquid 1 Application(s) Topical two times a day  ezetimibe 10 milliGRAM(s) Oral daily  metoprolol tartrate 25 milliGRAM(s) Oral every 12 hours  mupirocin 2% Ointment 1 Application(s) Topical two times a day  pantoprazole    Tablet 40 milliGRAM(s) Oral before breakfast  sodium chloride 0.9%. 500 milliLiter(s) (100 mL/Hr) IV Continuous <Continuous>    MEDICATIONS  (PRN):  acetaminophen   Tablet .. 650 milliGRAM(s) Oral every 6 hours PRN Mild Pain (1 - 3)  aluminum hydroxide/magnesium hydroxide/simethicone Suspension 30 milliLiter(s) Oral every 4 hours PRN Dyspepsia  ondansetron Injectable 4 milliGRAM(s) IV Push every 8 hours PRN Nausea and/or Vomiting      Social History: no active tobacco / EtOH / IVDA    Family History:     ROS: As noted above, otherwise unremarkable.    Vital Signs Last 24 Hrs  T(C): 36.5 (11 Jan 2020 05:14), Max: 37 (10 Jama 2020 19:15)  T(F): 97.7 (11 Jan 2020 05:14), Max: 98.6 (10 Jama 2020 19:15)  HR: 79 (11 Jan 2020 05:14) (67 - 84)  BP: 110/67 (11 Jan 2020 05:14) (110/67 - 158/99)  BP(mean): --  RR: 18 (11 Jan 2020 05:14) (15 - 19)  SpO2: 97% (11 Jan 2020 05:14) (96% - 100%)    I&O's Detail    10 Jama 2020 07:01  -  11 Jan 2020 07:00  --------------------------------------------------------  IN:    Oral Fluid: 480 mL  Total IN: 480 mL    OUT:  Total OUT: 0 mL    Total NET: 480 mL        I&O's Summary    10 Jama 2020 07:01  -  11 Jan 2020 07:00  --------------------------------------------------------  IN: 480 mL / OUT: 0 mL / NET: 480 mL            PHYSICAL EXAM:  General: Appears well developed, well nourished, no acute distress  HEENT: Head: normocephalic, atraumatic  Eyes: Pupils equal and reactive  Neck: Supple, no carotid bruit, no JVD, no HJR  CARDIOVASCULAR: Normal S1 and S2, no murmur, rub, or gallop  LUNGS: Clear to auscultation bilaterally, no rales, rhonchi or wheeze  ABDOMEN: Soft, nontender, non-distended, positive bowel sounds, no mass or bruit  EXTREMITIES: No edema, distal pulses WNL  SKIN: Warm and dry with normal turgor  NEURO: Alert & oriented x 3, grossly intact  PSYCH: normal mood and affect    LABS:                        13.7   6.33  )-----------( 273      ( 10 Jama 2020 13:33 )             43.5     01-10    135  |  102  |  10.0  ----------------------------<  134<H>  3.8   |  21.0<L>  |  0.99    Ca    8.7      10 Jama 2020 13:33  Phos  2.2     01-10  Mg     2.0     01-10    TPro  7.1  /  Alb  4.2  /  TBili  0.5  /  DBili  0.1  /  AST  42<H>  /  ALT  71<H>  /  AlkPhos  113  01-10        PT/INR - ( 10 Jama 2020 13:33 )   PT: 11.5 sec;   INR: 1.00 ratio         PTT - ( 10 Jama 2020 13:33 )  PTT:26.8 sec    RADIOLOGY & ADDITIONAL STUDIES:    ECG:    ECHO:    STRESS TEST:    CARDIAC CATHETERIZATION:    Assessment and Plan:  In summary, MARGOT JESUS is a 54y Male with past medical history significant for HTN, HL s/p cath - severe cad  for CABG     - cont current meds  - next step as per CTS  - meds reviewed  - VSS  - compliance discussed    We will follow with you.  Thank you for allowing me to participate in the care of your patient.      Sincerely,    Thad Ng,

## 2020-01-11 NOTE — PROGRESS NOTE ADULT - ASSESSMENT
HPI:  HPI: 53 y/o male with recent intermittent chest pain with exertion, and chest heaviness resolving with rest.  Saw his PCP whom he had not seen x10 years who then referred him to cardiologist.     HX:   +FH CAD premature, HTN, HLD, non compliance, angina, former smoker/s/p nephrectomy for donor status  ANGINAL/ISCHEMIC SS: Class III  ECHO: (WITHIN 6 MONTHS) Dec 2019  LHC: 2006 prior to nephrectomy  HAD LHC yesterday:  TVD with preserved LV function    Groin precautions:  No lifting > 10 pounds, no bathing, no driving x 5 days  Plan as per CTS

## 2020-01-11 NOTE — PROGRESS NOTE ADULT - SUBJECTIVE AND OBJECTIVE BOX
Subjective: "I feel ok but if I walk too much I will get chest pain" NAD denies cp, sob currently while sitting in bed.    VITAL SIGNS  Vital Signs Last 24 Hrs  T(C): 36.8 (20 @ 10:05), Max: 37 (01-10-20 @ 19:15)  T(F): 98.2 (20 @ 10:05), Max: 98.6 (01-10-20 @ 19:15)  HR: 73 (20 @ 10:05) (69 - 84)  BP: 135/88 (20 @ 10:05) (110/67 - 137/79)  RR: 18 (20 @ 10:05) (16 - 19)  SpO2: 99% (20 @ 10:05) (96% - 99%)  on RA            Telemetry/Alarms:  SR  LVEF: normal    MEDICATIONS  acetaminophen   Tablet .. 650 milliGRAM(s) Oral every 6 hours PRN  aluminum hydroxide/magnesium hydroxide/simethicone Suspension 30 milliLiter(s) Oral every 4 hours PRN  aspirin  chewable 81 milliGRAM(s) Oral daily  chlorhexidine 0.12% Liquid 15 milliLiter(s) Swish and Spit two times a day  chlorhexidine 4% Liquid 1 Application(s) Topical two times a day  ezetimibe 10 milliGRAM(s) Oral daily  metoprolol tartrate 25 milliGRAM(s) Oral every 12 hours  mupirocin 2% Ointment 1 Application(s) Topical two times a day  ondansetron Injectable 4 milliGRAM(s) IV Push every 8 hours PRN  pantoprazole    Tablet 40 milliGRAM(s) Oral before breakfast  sodium chloride 0.9%. 500 milliLiter(s) IV Continuous <Continuous>      PHYSICAL EXAM  General: well nourished, well developed, no acute distress  Neurology: alert and oriented x 3, nonfocal, no gross deficits  Respiratory: clear to auscultation bilaterally  CV: regular rate and rhythm, normal S1, S2  Abdomen: soft, nontender, nondistended, positive bowel sounds  Extremities: warm, well perfused. no edema. + DP pulses      -10 @ 07:01  -   @ 07:00  --------------------------------------------------------  IN: 480 mL / OUT: 0 mL / NET: 480 mL        Weights:  Daily     Daily Weight in k.3 (2020 06:36)  Admit Wt: Drug Dosing Weight  Height (cm): 170.2 (10 Jama 2020 10:26)  Weight (kg): 87.1 (10 Jama 2020 11:04)  BMI (kg/m2): 30.1 (10 Jama 2020 11:04)  BSA (m2): 1.99 (10 Jama 2020 11:04)    LABS  01-10    135  |  102  |  10.0  ----------------------------<  134<H>  3.8   |  21.0<L>  |  0.99    Ca    8.7      10 Jama 2020 13:33  Phos  2.2     01-10  Mg     2.0     01-10    TPro  7.1  /  Alb  4.2  /  TBili  0.5  /  DBili  0.1  /  AST  42<H>  /  ALT  71<H>  /  AlkPhos  113  01-10                                 13.7   6.33  )-----------( 273      ( 10 Jama 2020 13:33 )             43.5          PT/INR - ( 10 Jama 2020 13:33 )   PT: 11.5 sec;   INR: 1.00 ratio         PTT - ( 10 Jama 2020 13:33 )  PTT:26.8 sec    Prealbumin, Serum: 25 mg/dL (01-10 @ 13:33)    Hemoglobin A1C, Whole Blood: 8.6 % (01-10 @ 12:33)      Glucoses:   CAPILLARY BLOOD GLUCOSE        Last CXR: clear    Last EKG: < from: 12 Lead ECG (01.10.20 @ 10:17) >    Ventricular Rate 75 BPM    Atrial Rate 75 BPM    P-R Interval 136 ms    QRS Duration 84 ms    Q-T Interval 394 ms    QTC Calculation(Bezet) 439 ms    P Axis 2 degrees    R Axis 47 degrees    T Axis 40 degrees    Diagnosis Line Normal sinus rhythm  Normal ECG    Confirmed by JAMES BURKETT ([id number]) on 2020 3:52:10 PM    < end of copied text >      Echo: < from: TTE Echo Complete w/Doppler (01.10.20 @ 17:26) >      Summary:   1. Technically good study.   2. Hyperdynamic global left ventricular systolic function.   3. Left ventricular ejection fraction, by visual estimation, is >75%.   4. Spectral Doppler shows impaired relaxation pattern of left ventricular myocardial filling (Grade I diastolic dysfunction).   5. Mild thickening of the anterior mitral valve leaflet.   6. Trace mitral valve regurgitation.   7. Trivial pericardial effusion.    MD Priscilla Electronically signed on 2020 at 9:24:03 AM    < end of copied text >      Cath: < from: Cardiac Cath Lab - Adult (01.10.20 @ 11:59) >  VENTRICLES: There were no left ventricular global or regional wall motion  abnormalities. EF calculated by contrast ventriculography was 60 %.  CORONARY VESSELS: The coronary circulation is right dominant.  LM:   --  LM: Angiography showed minor luminal irregularities with no flow  limiting lesions.  LAD:   --  Mid LAD: The distal vessel was supplied by extensive collaterals  from the third obtuse marginal. There was a 100 % stenosis.  --  D1: There was a discrete 90 % stenosis at the ostium of the vessel  segment. The lesion was hazy, complex, and eccentric. There was ANDRY grade  3 flow through the vessel (brisk flow).  CX:   --  OM2: The vessel was large sized. There was a tubular 100 %  stenosis in the proximal third of the vessel segment. There was ANDRY grade  0 flow through the vessel (no flow).  RCA:   --  Proximal RCA: There was a tubular 99 % stenosis. The lesion was  hazy, complex,and eccentric. There was ANDRY grade 1 flow through the  vessel (slow flow without perfusion).  --  RPDA: The distal vessel was supplied by extensive collaterals from the  third obtuse marginal.  ARCH VESSELS: LIMA: Normal.  COMPLICATIONS: No complications occurred during the cath lab visit.  DIAGNOSTIC IMPRESSIONS: Unstable angina with minimal exertion (CCS cl III)  Severe native three vessel CAD with preserved LV function  The LIMA is widely patent  DIAGNOSTIC RECOMMENDATIONS: Admit to Dr Ann for 4V CABG (LAD,D1, OM2,  RPDA)  Gentle hydration  Agrressive antihypertensive (BB, ACEI) and lipid-lowering therapy  (atorvastatin 80 mg daily)  Will follow    < end of copied text >      PAST MEDICAL & SURGICAL HISTORY:  Statin intolerance: elevated liver enzymes  Carotid disease, bilateral: mild  HLD (hyperlipidemia)  HTN (hypertension)  Non-compliance: with medical care  AP (angina pectoris)  Obesity  H/O elbow surgery: right  S/p nephrectomy: left side for donor status

## 2020-01-12 LAB
BLD GP AB SCN SERPL QL: SIGNIFICANT CHANGE UP
GLUCOSE BLDC GLUCOMTR-MCNC: 113 MG/DL — HIGH (ref 70–99)
GLUCOSE BLDC GLUCOMTR-MCNC: 146 MG/DL — HIGH (ref 70–99)
GLUCOSE BLDC GLUCOMTR-MCNC: 149 MG/DL — HIGH (ref 70–99)
GLUCOSE BLDC GLUCOMTR-MCNC: 188 MG/DL — HIGH (ref 70–99)

## 2020-01-12 PROCEDURE — 99232 SBSQ HOSP IP/OBS MODERATE 35: CPT

## 2020-01-12 RX ORDER — VANCOMYCIN HCL 1 G
1000 VIAL (EA) INTRAVENOUS ONCE
Refills: 0 | Status: DISCONTINUED | OUTPATIENT
Start: 2020-01-13 | End: 2020-01-13

## 2020-01-12 RX ORDER — CEFUROXIME AXETIL 250 MG
1500 TABLET ORAL ONCE
Refills: 0 | Status: DISCONTINUED | OUTPATIENT
Start: 2020-01-13 | End: 2020-01-13

## 2020-01-12 RX ADMIN — CHLORHEXIDINE GLUCONATE 15 MILLILITER(S): 213 SOLUTION TOPICAL at 05:21

## 2020-01-12 RX ADMIN — PANTOPRAZOLE SODIUM 40 MILLIGRAM(S): 20 TABLET, DELAYED RELEASE ORAL at 05:20

## 2020-01-12 RX ADMIN — Medication 25 MILLIGRAM(S): at 18:19

## 2020-01-12 RX ADMIN — Medication 25 MILLIGRAM(S): at 05:21

## 2020-01-12 RX ADMIN — Medication 2: at 18:19

## 2020-01-12 RX ADMIN — MUPIROCIN 1 APPLICATION(S): 20 OINTMENT TOPICAL at 05:21

## 2020-01-12 RX ADMIN — EZETIMIBE 10 MILLIGRAM(S): 10 TABLET ORAL at 08:39

## 2020-01-12 RX ADMIN — CHLORHEXIDINE GLUCONATE 15 MILLILITER(S): 213 SOLUTION TOPICAL at 22:40

## 2020-01-12 RX ADMIN — Medication 81 MILLIGRAM(S): at 08:39

## 2020-01-12 RX ADMIN — CHLORHEXIDINE GLUCONATE 1 APPLICATION(S): 213 SOLUTION TOPICAL at 22:40

## 2020-01-12 RX ADMIN — MUPIROCIN 1 APPLICATION(S): 20 OINTMENT TOPICAL at 22:41

## 2020-01-12 RX ADMIN — CHLORHEXIDINE GLUCONATE 1 APPLICATION(S): 213 SOLUTION TOPICAL at 09:24

## 2020-01-12 NOTE — PROGRESS NOTE ADULT - SUBJECTIVE AND OBJECTIVE BOX
[facility], [facility]                                                    [address], [place] [address]                                                         PHONE: [phone]    FAX: [phone]                                                                [address], [address], [place] [address]                                                 PHONE: [phone]    FAX: [phone]  *******************************************************************************    Overnight events/Subjective Assessment:    INTERPRETATION OF TELEMETRY (personally reviewed):    No Known Allergies    MEDICATIONS  (STANDING):  aspirin  chewable 81 milliGRAM(s) Oral daily  chlorhexidine 0.12% Liquid 15 milliLiter(s) Swish and Spit two times a day  chlorhexidine 4% Liquid 1 Application(s) Topical two times a day  dextrose 5%. 1000 milliLiter(s) (50 mL/Hr) IV Continuous <Continuous>  dextrose 50% Injectable 12.5 Gram(s) IV Push once  dextrose 50% Injectable 25 Gram(s) IV Push once  dextrose 50% Injectable 25 Gram(s) IV Push once  ezetimibe 10 milliGRAM(s) Oral daily  insulin lispro (HumaLOG) corrective regimen sliding scale   SubCutaneous three times a day before meals  insulin lispro (HumaLOG) corrective regimen sliding scale   SubCutaneous at bedtime  metoprolol tartrate 25 milliGRAM(s) Oral every 12 hours  mupirocin 2% Ointment 1 Application(s) Topical two times a day  pantoprazole    Tablet 40 milliGRAM(s) Oral before breakfast  sodium chloride 0.9%. 500 milliLiter(s) (100 mL/Hr) IV Continuous <Continuous>    MEDICATIONS  (PRN):  acetaminophen   Tablet .. 650 milliGRAM(s) Oral every 6 hours PRN Mild Pain (1 - 3)  aluminum hydroxide/magnesium hydroxide/simethicone Suspension 30 milliLiter(s) Oral every 4 hours PRN Dyspepsia  dextrose 40% Gel 15 Gram(s) Oral once PRN Blood Glucose LESS THAN 70 milliGRAM(s)/deciliter  glucagon  Injectable 1 milliGRAM(s) IntraMuscular once PRN Glucose LESS THAN 70 milligrams/deciliter  ondansetron Injectable 4 milliGRAM(s) IV Push every 8 hours PRN Nausea and/or Vomiting      Vital Signs Last 24 Hrs  T(C): 36.5 (12 Jan 2020 05:15), Max: 36.9 (11 Jan 2020 21:46)  T(F): 97.7 (12 Jan 2020 05:15), Max: 98.4 (11 Jan 2020 21:46)  HR: 72 (12 Jan 2020 05:15) (72 - 91)  BP: 138/84 (12 Jan 2020 05:15) (104/76 - 150/90)  BP(mean): --  RR: 18 (12 Jan 2020 05:15) (18 - 18)  SpO2: 100% (12 Jan 2020 05:15) (97% - 100%)    I&O's Detail    11 Jan 2020 07:01  -  12 Jan 2020 07:00  --------------------------------------------------------  IN:    Oral Fluid: 480 mL  Total IN: 480 mL    OUT:  Total OUT: 0 mL    Total NET: 480 mL        I&O's Summary    11 Jan 2020 07:01  -  12 Jan 2020 07:00  --------------------------------------------------------  IN: 480 mL / OUT: 0 mL / NET: 480 mL            PHYSICAL EXAM:  General: Appears well developed, well nourished, no acute distress  HEENT: Head: normocephalic, atraumatic  Eyes: Pupils equal and reactive  Neck: Supple, no carotid bruit, no JVD, no HJR  CARDIOVASCULAR: Normal S1 and S2, no murmur, rub, or gallop  LUNGS: Clear to auscultation bilaterally, no rales, rhonchi or wheeze  ABDOMEN: Soft, nontender, non-distended, positive bowel sounds, no mass or bruit  EXTREMITIES: No edema, distal pulses WNL  SKIN: Warm and dry with normal turgor  NEURO: Alert & oriented x 3, grossly intact  PSYCH: normal mood and affect        LABS:                        13.7   6.33  )-----------( 273      ( 10 Jama 2020 13:33 )             43.5     01-10    135  |  102  |  10.0  ----------------------------<  134<H>  3.8   |  21.0<L>  |  0.99    Ca    8.7      10 Jama 2020 13:33  Phos  2.2     01-10  Mg     2.0     01-10    TPro  7.1  /  Alb  4.2  /  TBili  0.5  /  DBili  0.1  /  AST  42<H>  /  ALT  71<H>  /  AlkPhos  113  01-10        PT/INR - ( 10 Jama 2020 13:33 )   PT: 11.5 sec;   INR: 1.00 ratio         PTT - ( 10 Jama 2020 13:33 )  PTT:26.8 sec  Serum Pro-Brain Natriuretic Peptide: 130 pg/mL (01-10 @ 13:33)  serum  Lipids:   Hemoglobin A1C, Whole Blood: 8.6 % (01-10 @ 12:33)    Thyroid Stimulating Hormone, Serum: 1.12 uIU/mL (01-10 @ 13:33)      RADIOLOGY & ADDITIONAL STUDIES:    ECG:    ECHO:    STRESS TEST:    CARDIAC CATHETERIZATION:    ASSESSMENT AND PLAN:  In summary, MARGOT JESUS is a 54y Male with past medical history significant for severe CAD awaiting CABG  - risk/benefits d/w pt he wants to proceed  - meds reviewed  - next step as per CTS      Thad Ng DO

## 2020-01-12 NOTE — PROGRESS NOTE ADULT - SUBJECTIVE AND OBJECTIVE BOX
Cardiac Surgery Pre-op Note:  Pt in chair NAD No issues overnight                                                                                                                   Surgeon: Marissa     Procedure: CAD    Allergies    No Known Allergies    Intolerances        HPI:  HPI: 53 y/o male with recent intermittent chest pain with exertion, and chest heaviness resolving with rest.  Saw his PCP whom he had not seen x10 years who then referred him to cardiologist.     States daily compliance with aspirin 81 mg po.      ROS: Denies cough, palpitation, DIETRICH, lightheadedness, or near syncope/syncope    Co Morbidities:  +FH CAD premature, HTN, HLD, non compliance, angina, former smoker/s/p nephrectomy for donor status      ANGINAL/ISCHEMIC SS: Class III    ECHO: (WITHIN 6 MONTHS) Dec 2019                                                NON INVASIVE TESTING: not rendered    LHC:  prior to nephrectomy    ANTIANGINAL MEDICATIONS: None    ASA 2  MALLAMPATI 2  BR 0.8% (10 Jama 2020 10:04)      PAST MEDICAL & SURGICAL HISTORY:  Statin intolerance: elevated liver enzymes  Carotid disease, bilateral: mild  HLD (hyperlipidemia)  HTN (hypertension)  Non-compliance: with medical care  AP (angina pectoris)  Obesity  H/O elbow surgery: right  S/p nephrectomy: left side for donor status      MEDICATIONS  (STANDING):  aspirin  chewable 81 milliGRAM(s) Oral daily  chlorhexidine 0.12% Liquid 15 milliLiter(s) Swish and Spit two times a day  chlorhexidine 4% Liquid 1 Application(s) Topical two times a day  dextrose 5%. 1000 milliLiter(s) (50 mL/Hr) IV Continuous <Continuous>  dextrose 50% Injectable 12.5 Gram(s) IV Push once  dextrose 50% Injectable 25 Gram(s) IV Push once  dextrose 50% Injectable 25 Gram(s) IV Push once  ezetimibe 10 milliGRAM(s) Oral daily  insulin lispro (HumaLOG) corrective regimen sliding scale   SubCutaneous three times a day before meals  insulin lispro (HumaLOG) corrective regimen sliding scale   SubCutaneous at bedtime  metoprolol tartrate 25 milliGRAM(s) Oral every 12 hours  mupirocin 2% Ointment 1 Application(s) Topical two times a day  pantoprazole    Tablet 40 milliGRAM(s) Oral before breakfast  sodium chloride 0.9%. 500 milliLiter(s) (100 mL/Hr) IV Continuous <Continuous>    MEDICATIONS  (PRN):  acetaminophen   Tablet .. 650 milliGRAM(s) Oral every 6 hours PRN Mild Pain (1 - 3)  aluminum hydroxide/magnesium hydroxide/simethicone Suspension 30 milliLiter(s) Oral every 4 hours PRN Dyspepsia  dextrose 40% Gel 15 Gram(s) Oral once PRN Blood Glucose LESS THAN 70 milliGRAM(s)/deciliter  glucagon  Injectable 1 milliGRAM(s) IntraMuscular once PRN Glucose LESS THAN 70 milligrams/deciliter  ondansetron Injectable 4 milliGRAM(s) IV Push every 8 hours PRN Nausea and/or Vomiting        Labs:            Urinalysis Basic - ( 2020 18:30 )    Color: Yellow / Appearance: Clear / S.020 / pH: x  Gluc: x / Ketone: Trace  / Bili: Negative / Urobili: Negative mg/dL   Blood: x / Protein: Negative mg/dL / Nitrite: Negative   Leuk Esterase: Negative / RBC: 0-2 /HPF / WBC 0-2   Sq Epi: x / Non Sq Epi: Occasional / Bacteria: Occasional      Hemoglobin A1C, Whole Blood: 8.6 % (01-10 @ 12:33)                CXR:  < from: Xray Chest 1 View- PORTABLE-Urgent (20 @ 12:36) >  No focal consolidation.  No pleural effusions or pneumothorax.  The cardiomediastinal silhouette is within normal limits.      IMPRESSION: No focal consolidation.    < end of copied text >      EKG:   < from: 12 Lead ECG (01.10.20 @ 10:17) >  Ventricular Rate 75 BPM    Atrial Rate 75 BPM    P-R Interval 136 ms    QRS Duration 84 ms    Q-T Interval 394 ms    QTC Calculation(Bezet) 439 ms    P Axis 2 degrees    R Axis 47 degrees    T Axis 40 degrees    Diagnosis Line Normal sinus rhythm  Normal ECG    < end of copied text >    Carotid Duplex:  < from: US Duplex Carotid Arteries Complete, Bilateral (01.10.20 @ 21:03) >  IMPRESSION:     1. 1.2 cm left lobe thyroid nodule. Dedicated thyroid ultrasound nonemergent basis.  2. No hemodynamically significant stenosis.     REFERENCE:     Carotid Stenosis Reference using SRU criteria: Mild: less than 50% stenosis.   ICA PSV is less than 125 cm/second and plaque or intimal thickening is visible.   Moderate: 50-69% stenosis. ICA PSV is 125 to 230 cm/second and plaque is   visible. Severe: 70-94% stenosis. ICA PSV is more than 230 cm/second and   visible plaque and lumen narrowing are seen. Near occlusion: 95-99% stenosis.   ICA PSV is variable and significant plaque and luminal narrowing are seen.   Occluded: 100% stenosis. No flow identified.    < end of copied text >    PFT's: machine is broken unable to obtain     Echo:   < from: TTE Echo Complete w/Doppler (01.10.20 @ 17:26) >  Summary:   1. Technically good study.   2. Hyperdynamic global left ventricular systolic function.   3. Left ventricular ejection fraction, by visual estimation, is >75%.   4. Spectral Doppler shows impaired relaxation pattern of left ventricular myocardial filling (Grade I diastolic dysfunction).   5. Mild thickening of the anterior mitral valve leaflet.   6. Trace mitral valve regurgitation.   7. Trivial pericardial effusion.    < end of copied text >    Cath report:  < from: Cardiac Cath Lab - Adult (01.10.20 @ 11:59) >  VENTRICLES: There were no left ventricular global or regional wall motion  abnormalities. EF calculated by contrast ventriculography was 60 %.  CORONARY VESSELS: The coronary circulation is right dominant.  LM:   --  LM: Angiography showed minor luminal irregularities with no flow  limiting lesions.  LAD:   --  Mid LAD: The distal vessel was supplied by extensive collaterals  from the third obtuse marginal. There was a 100 % stenosis.  --  D1: There was a discrete 90 % stenosis at the ostium of the vessel  segment. The lesion was hazy, complex, and eccentric. There was ANDRY grade  3 flow through the vessel (brisk flow).  CX:   --  OM2: The vessel was large sized. There was a tubular 100 %  stenosis in the proximal third of the vessel segment. There was ANDRY grade  0 flow through the vessel (no flow).  RCA:   --  Proximal RCA: There was a tubular 99 % stenosis. The lesion was  hazy, complex,and eccentric. There was ANDRY grade 1 flow through the  vessel (slow flow without perfusion).  --  RPDA: The distal vessel was supplied by extensive collaterals from the  third obtuse marginal.  ARCH VESSELS: LIMA: Normal.    < end of copied text >          Pt has AICD/PPm [ ] Yes  [x] No             Brand Name:  Pre-op Beta Blocker ordered within 24 hrs of surgery?  [x ] Yes  [ ] No  If not, Why?  Type & Cross  [x ] Yes  [ ] No  NPO after Midnight [x ] Yes  [ ] No  Pre-op ABX ordered, to be taped on chart:  [x ] Yes  [ ] No   ( Vanco only if Anaphylaxis, or MRSA)  Consent obtained  [ ] Yes  [ x] No

## 2020-01-13 ENCOUNTER — TRANSCRIPTION ENCOUNTER (OUTPATIENT)
Age: 55
End: 2020-01-13

## 2020-01-13 LAB
ALBUMIN SERPL ELPH-MCNC: 4 G/DL — SIGNIFICANT CHANGE UP (ref 3.3–5.2)
ALBUMIN SERPL ELPH-MCNC: 4.2 G/DL — SIGNIFICANT CHANGE UP (ref 3.3–5.2)
ALP SERPL-CCNC: 106 U/L — SIGNIFICANT CHANGE UP (ref 40–120)
ALP SERPL-CCNC: 109 U/L — SIGNIFICANT CHANGE UP (ref 40–120)
ALT FLD-CCNC: 66 U/L — HIGH
ALT FLD-CCNC: 74 U/L — HIGH
ANION GAP SERPL CALC-SCNC: 11 MMOL/L — SIGNIFICANT CHANGE UP (ref 5–17)
ANION GAP SERPL CALC-SCNC: 12 MMOL/L — SIGNIFICANT CHANGE UP (ref 5–17)
AST SERPL-CCNC: 46 U/L — HIGH
AST SERPL-CCNC: 54 U/L — HIGH
BILIRUB SERPL-MCNC: 0.4 MG/DL — SIGNIFICANT CHANGE UP (ref 0.4–2)
BILIRUB SERPL-MCNC: 0.4 MG/DL — SIGNIFICANT CHANGE UP (ref 0.4–2)
BUN SERPL-MCNC: 18 MG/DL — SIGNIFICANT CHANGE UP (ref 8–20)
BUN SERPL-MCNC: 20 MG/DL — SIGNIFICANT CHANGE UP (ref 8–20)
CALCIUM SERPL-MCNC: 8.9 MG/DL — SIGNIFICANT CHANGE UP (ref 8.6–10.2)
CALCIUM SERPL-MCNC: 9.1 MG/DL — SIGNIFICANT CHANGE UP (ref 8.6–10.2)
CHLORIDE SERPL-SCNC: 102 MMOL/L — SIGNIFICANT CHANGE UP (ref 98–107)
CHLORIDE SERPL-SCNC: 103 MMOL/L — SIGNIFICANT CHANGE UP (ref 98–107)
CO2 SERPL-SCNC: 21 MMOL/L — LOW (ref 22–29)
CO2 SERPL-SCNC: 22 MMOL/L — SIGNIFICANT CHANGE UP (ref 22–29)
CREAT SERPL-MCNC: 1.25 MG/DL — SIGNIFICANT CHANGE UP (ref 0.5–1.3)
CREAT SERPL-MCNC: 1.36 MG/DL — HIGH (ref 0.5–1.3)
GLUCOSE BLDC GLUCOMTR-MCNC: 118 MG/DL — HIGH (ref 70–99)
GLUCOSE BLDC GLUCOMTR-MCNC: 134 MG/DL — HIGH (ref 70–99)
GLUCOSE BLDC GLUCOMTR-MCNC: 142 MG/DL — HIGH (ref 70–99)
GLUCOSE BLDC GLUCOMTR-MCNC: 142 MG/DL — HIGH (ref 70–99)
GLUCOSE SERPL-MCNC: 153 MG/DL — HIGH (ref 70–115)
GLUCOSE SERPL-MCNC: 157 MG/DL — HIGH (ref 70–115)
HCT VFR BLD CALC: 44.7 % — SIGNIFICANT CHANGE UP (ref 39–50)
HGB BLD-MCNC: 13.9 G/DL — SIGNIFICANT CHANGE UP (ref 13–17)
MAGNESIUM SERPL-MCNC: 2.2 MG/DL — SIGNIFICANT CHANGE UP (ref 1.6–2.6)
MCHC RBC-ENTMCNC: 26.8 PG — LOW (ref 27–34)
MCHC RBC-ENTMCNC: 31.1 GM/DL — LOW (ref 32–36)
MCV RBC AUTO: 86.1 FL — SIGNIFICANT CHANGE UP (ref 80–100)
PLATELET # BLD AUTO: 255 K/UL — SIGNIFICANT CHANGE UP (ref 150–400)
POTASSIUM SERPL-MCNC: 4.1 MMOL/L — SIGNIFICANT CHANGE UP (ref 3.5–5.3)
POTASSIUM SERPL-MCNC: 5.3 MMOL/L — SIGNIFICANT CHANGE UP (ref 3.5–5.3)
POTASSIUM SERPL-SCNC: 4.1 MMOL/L — SIGNIFICANT CHANGE UP (ref 3.5–5.3)
POTASSIUM SERPL-SCNC: 5.3 MMOL/L — SIGNIFICANT CHANGE UP (ref 3.5–5.3)
PROT SERPL-MCNC: 7.3 G/DL — SIGNIFICANT CHANGE UP (ref 6.6–8.7)
PROT SERPL-MCNC: 7.7 G/DL — SIGNIFICANT CHANGE UP (ref 6.6–8.7)
RBC # BLD: 5.19 M/UL — SIGNIFICANT CHANGE UP (ref 4.2–5.8)
RBC # FLD: 13.6 % — SIGNIFICANT CHANGE UP (ref 10.3–14.5)
SODIUM SERPL-SCNC: 135 MMOL/L — SIGNIFICANT CHANGE UP (ref 135–145)
SODIUM SERPL-SCNC: 136 MMOL/L — SIGNIFICANT CHANGE UP (ref 135–145)
WBC # BLD: 7.82 K/UL — SIGNIFICANT CHANGE UP (ref 3.8–10.5)
WBC # FLD AUTO: 7.82 K/UL — SIGNIFICANT CHANGE UP (ref 3.8–10.5)

## 2020-01-13 PROCEDURE — 71045 X-RAY EXAM CHEST 1 VIEW: CPT | Mod: 26

## 2020-01-13 PROCEDURE — 99223 1ST HOSP IP/OBS HIGH 75: CPT

## 2020-01-13 RX ORDER — CEFUROXIME AXETIL 250 MG
1500 TABLET ORAL ONCE
Refills: 0 | Status: DISCONTINUED | OUTPATIENT
Start: 2020-01-13 | End: 2020-01-14

## 2020-01-13 RX ORDER — CHLORHEXIDINE GLUCONATE 213 G/1000ML
15 SOLUTION TOPICAL
Refills: 0 | Status: DISCONTINUED | OUTPATIENT
Start: 2020-01-13 | End: 2020-01-14

## 2020-01-13 RX ORDER — VANCOMYCIN HCL 1 G
1000 VIAL (EA) INTRAVENOUS ONCE
Refills: 0 | Status: DISCONTINUED | OUTPATIENT
Start: 2020-01-13 | End: 2020-01-14

## 2020-01-13 RX ORDER — SODIUM CHLORIDE 9 MG/ML
1000 INJECTION, SOLUTION INTRAVENOUS
Refills: 0 | Status: DISCONTINUED | OUTPATIENT
Start: 2020-01-13 | End: 2020-01-14

## 2020-01-13 RX ADMIN — Medication 25 MILLIGRAM(S): at 06:52

## 2020-01-13 RX ADMIN — CHLORHEXIDINE GLUCONATE 15 MILLILITER(S): 213 SOLUTION TOPICAL at 07:25

## 2020-01-13 RX ADMIN — Medication 81 MILLIGRAM(S): at 11:10

## 2020-01-13 RX ADMIN — SODIUM CHLORIDE 100 MILLILITER(S): 9 INJECTION, SOLUTION INTRAVENOUS at 07:25

## 2020-01-13 RX ADMIN — SODIUM CHLORIDE 100 MILLILITER(S): 9 INJECTION, SOLUTION INTRAVENOUS at 17:02

## 2020-01-13 RX ADMIN — CHLORHEXIDINE GLUCONATE 1 APPLICATION(S): 213 SOLUTION TOPICAL at 21:11

## 2020-01-13 RX ADMIN — Medication 25 MILLIGRAM(S): at 17:02

## 2020-01-13 RX ADMIN — PANTOPRAZOLE SODIUM 40 MILLIGRAM(S): 20 TABLET, DELAYED RELEASE ORAL at 06:52

## 2020-01-13 RX ADMIN — MUPIROCIN 1 APPLICATION(S): 20 OINTMENT TOPICAL at 06:52

## 2020-01-13 RX ADMIN — CHLORHEXIDINE GLUCONATE 15 MILLILITER(S): 213 SOLUTION TOPICAL at 17:02

## 2020-01-13 RX ADMIN — CHLORHEXIDINE GLUCONATE 1 APPLICATION(S): 213 SOLUTION TOPICAL at 10:11

## 2020-01-13 RX ADMIN — MUPIROCIN 1 APPLICATION(S): 20 OINTMENT TOPICAL at 17:02

## 2020-01-13 RX ADMIN — SODIUM CHLORIDE 100 MILLILITER(S): 9 INJECTION, SOLUTION INTRAVENOUS at 06:52

## 2020-01-13 RX ADMIN — CHLORHEXIDINE GLUCONATE 15 MILLILITER(S): 213 SOLUTION TOPICAL at 22:59

## 2020-01-13 RX ADMIN — EZETIMIBE 10 MILLIGRAM(S): 10 TABLET ORAL at 11:10

## 2020-01-13 NOTE — CHART NOTE - NSCHARTNOTEFT_GEN_A_CORE
Patient known to service for OR today for CABG after chest pain on 1/10 w/ cath significant for multivessel disease   AM labs performed- pt with history of nephrectomy secondary to donation - hydrated post cath   now with AUBREE creatine 1.36, 1.17 on admission, 0.99 pre cath   also of note with mild transaminitis on labs     ICU Vital Signs Last 24 Hrs  T(C): 36.8 (13 Jan 2020 04:31), Max: 37 (12 Jan 2020 14:38)  T(F): 98.2 (13 Jan 2020 04:31), Max: 98.6 (12 Jan 2020 14:38)  HR: 70 (13 Jan 2020 04:31) (69 - 90)  BP: 101/71 (13 Jan 2020 04:31) (101/71 - 138/84)  BP(mean): 82 (13 Jan 2020 04:00) (82 - 102)  RR: 16 (13 Jan 2020 04:31) (16 - 20)  SpO2: 96% (13 Jan 2020 04:31) (96% - 100%)    01-13    136  |  103  |  20.0  ----------------------------<  153<H>  5.3   |  21.0<L>  |  1.36<H>    Ca    8.9      13 Jan 2020 04:04  Mg     2.2     01-13    TPro  7.3  /  Alb  4.0  /  TBili  0.4  /  DBili  x   /  AST  54<H>  /  ALT  66<H>  /  AlkPhos  106  01-13                          13.9   7.82  )-----------( 255      ( 13 Jan 2020 04:04 )             44.7       will d.w team in AM   low threshold for nephrology involvement post operatively

## 2020-01-13 NOTE — CONSULT NOTE ADULT - SUBJECTIVE AND OBJECTIVE BOX
[facility] DIVISION OF KIDNEY DISEASES AND HYPERTENSION -- INITIAL CONSULT NOTE  --------------------------------------------------------------------------------  HPI:    54 year old man with history of HTN, HLD, nephrolithiasis, s/p left donor nephrectomy presented with chest pain. s/p LHC on 01/10 significant for multivessel disease. Pt scheduled for OR for CABG today; cancelled due to AUBREE. Nephrology consulted for further eval. pt seen and examined. States he feels well. Denies cp, dyspnea, n/v/d, abdominal pain, changes in urination, leg edema. He had a nephrectomy in 2007; donated left kidney to his mother in law. He has never followed with a nephrologist. Denies NSAID, OTC meds or herbal supplements use.    PAST HISTORY  --------------------------------------------------------------------------------  PAST MEDICAL & SURGICAL HISTORY:  Statin intolerance: elevated liver enzymes  Carotid disease, bilateral: mild  HLD (hyperlipidemia)  HTN (hypertension)  Non-compliance: with medical care  AP (angina pectoris)  Obesity  H/O elbow surgery: right  S/p nephrectomy: left side for donor status    FAMILY HISTORY:    PAST SOCIAL HISTORY:    ALLERGIES & MEDICATIONS  --------------------------------------------------------------------------------  Allergies    No Known Allergies    Intolerances    ohs (Unknown)    Standing Inpatient Medications  aspirin  chewable 81 milliGRAM(s) Oral daily  cefuroxime  IVPB 1500 milliGRAM(s) IV Intermittent once  chlorhexidine 0.12% Liquid 15 milliLiter(s) Swish and Spit two times a day  chlorhexidine 4% Liquid 1 Application(s) Topical two times a day  ezetimibe 10 milliGRAM(s) Oral daily  insulin lispro (HumaLOG) corrective regimen sliding scale   SubCutaneous three times a day before meals  insulin lispro (HumaLOG) corrective regimen sliding scale   SubCutaneous at bedtime  metoprolol tartrate 25 milliGRAM(s) Oral every 12 hours  multiple electrolytes Injection Type 1 1000 milliLiter(s) IV Continuous <Continuous>  mupirocin 2% Ointment 1 Application(s) Topical two times a day  pantoprazole    Tablet 40 milliGRAM(s) Oral before breakfast  vancomycin  IVPB 1000 milliGRAM(s) IV Intermittent once    PRN Inpatient Medications  acetaminophen   Tablet .. 650 milliGRAM(s) Oral every 6 hours PRN  aluminum hydroxide/magnesium hydroxide/simethicone Suspension 30 milliLiter(s) Oral every 4 hours PRN  ondansetron Injectable 4 milliGRAM(s) IV Push every 8 hours PRN      REVIEW OF SYSTEMS  --------------------------------------------------------------------------------  Gen: No weight changes, fatigue, fevers/chills, weakness  Skin: No rashes  Head/Eyes/Ears/Mouth: No headache; Normal hearing; Normal vision w/o blurriness; No sinus pain/discomfort, sore throat  Respiratory: No dyspnea, cough, wheezing, hemoptysis  CV: No chest pain, PND, orthopnea  GI: No abdominal pain, diarrhea, constipation, nausea, vomiting, melena, hematochezia  : No increased frequency, dysuria, hematuria, nocturia  MSK: No joint pain/swelling; no back pain; no edema  Neuro: No dizziness/lightheadedness, weakness, seizures, numbness, tingling  Heme: No easy bruising or bleeding  Endo: No heat/cold intolerance  Psych: No significant nervousness, anxiety, stress, depression    All other systems were reviewed and are negative, except as noted.    VITALS/PHYSICAL EXAM  --------------------------------------------------------------------------------  T(C): 36.8 (01-13-20 @ 13:00), Max: 37 (01-12-20 @ 14:38)  HR: 71 (01-13-20 @ 13:00) (68 - 90)  BP: 114/58 (01-13-20 @ 13:00) (99/57 - 137/80)  RR: 16 (01-13-20 @ 13:00) (16 - 20)  SpO2: 97% (01-13-20 @ 13:00) (96% - 99%)  Wt(kg): --  Height (cm): 170 (01-13-20 @ 04:31)  Weight (kg): 87.1 (01-13-20 @ 04:31)  BMI (kg/m2): 30.1 (01-13-20 @ 04:31)  BSA (m2): 1.99 (01-13-20 @ 04:31)      01-12-20 @ 07:01  -  01-13-20 @ 07:00  --------------------------------------------------------  IN: 600 mL / OUT: 500 mL / NET: 100 mL    01-13-20 @ 07:01  -  01-13-20 @ 13:29  --------------------------------------------------------  IN: 1420 mL / OUT: 1250 mL / NET: 170 mL      Physical Exam:  	Gen: NAD, well-appearing  	HEENT: supple neck  	Pulm: CTA B/L  	CV: RRR, S1S2; no rub  	Back: No spinal or CVA tenderness; no sacral edema  	Abd: +BS, soft, nontender/nondistended  	: No suprapubic tenderness  	UE: Warm, no edema; no asterixis  	LE: Warm, no edema  	Neuro: No focal deficits  	Psych: Normal affect and mood  	Skin: Warm, without rashes  	Vascular access: none    LABS/STUDIES  --------------------------------------------------------------------------------              13.9   7.82  >-----------<  255      [01-13-20 @ 04:04]              44.7     135  |  102  |  18.0  ----------------------------<  157      [01-13-20 @ 11:18]  4.1   |  22.0  |  1.25        Ca     9.1     [01-13-20 @ 11:18]      Mg     2.2     [01-13-20 @ 04:04]    TPro  7.7  /  Alb  4.2  /  TBili  0.4  /  DBili  x   /  AST  46  /  ALT  74  /  AlkPhos  109  [01-13-20 @ 11:18]          Creatinine Trend:  SCr 1.25 [01-13 @ 11:18]  SCr 1.36 [01-13 @ 04:04]  SCr 0.99 [01-10 @ 13:33]  SCr 1.17 [01-10 @ 10:50]    Urinalysis - [01-11-20 @ 18:30]      Color Yellow / Appearance Clear / SG 1.020 / pH 6.0      Gluc 250 / Ketone Trace  / Bili Negative / Urobili Negative       Blood Trace / Protein Negative / Leuk Est Negative / Nitrite Negative      RBC 0-2 / WBC 0-2 / Hyaline  / Gran  / Sq Epi  / Non Sq Epi Occasional / Bacteria Occasional      HbA1c 8.6      [01-10-20 @ 12:33]  TSH 1.12      [01-10-20 @ 13:33]  Lipid: chol 431, , HDL 33,       [01-10-20 @ 10:50]    HCV 0.06, Nonreact      [01-10-20 @ 22:04]  HIV Nonreact      [01-10-20 @ 10:50]

## 2020-01-13 NOTE — PROVIDER CONTACT NOTE (OTHER) - RECOMMENDATIONS
Informed PA of situation with PFT machine not working properly . To pass on info to rest of [facility] members.

## 2020-01-13 NOTE — CONSULT NOTE ADULT - ASSESSMENT
1) Solitary Rt  kidney s/p donor left nephrectomy  2) AUBREE  3) Multivessel CAD  4) HTN    Pt with normal baseline Scr, now with AUBREE  Timing of rise in Scr suggests MARCIA  Scr plateaued at 1.3, now stable at 1.2  Continue IV hydration  UA with trace bacteria, ketone, blood and glucose  Repeat UA, TP/Cr ratio  Renal US  Will follow

## 2020-01-14 ENCOUNTER — APPOINTMENT (OUTPATIENT)
Dept: CARDIOTHORACIC SURGERY | Facility: HOSPITAL | Age: 55
End: 2020-01-14

## 2020-01-14 DIAGNOSIS — Z78.9 OTHER SPECIFIED HEALTH STATUS: ICD-10-CM

## 2020-01-14 LAB
ALBUMIN SERPL ELPH-MCNC: 4.2 G/DL — SIGNIFICANT CHANGE UP (ref 3.3–5.2)
ALP SERPL-CCNC: 53 U/L — SIGNIFICANT CHANGE UP (ref 40–120)
ALT FLD-CCNC: 41 U/L — HIGH
ANION GAP SERPL CALC-SCNC: 15 MMOL/L — SIGNIFICANT CHANGE UP (ref 5–17)
ANION GAP SERPL CALC-SCNC: 15 MMOL/L — SIGNIFICANT CHANGE UP (ref 5–17)
APTT BLD: 32.7 SEC — SIGNIFICANT CHANGE UP (ref 27.5–36.3)
AST SERPL-CCNC: 49 U/L — HIGH
BILIRUB SERPL-MCNC: 0.8 MG/DL — SIGNIFICANT CHANGE UP (ref 0.4–2)
BLD GP AB SCN SERPL QL: SIGNIFICANT CHANGE UP
BUN SERPL-MCNC: 13 MG/DL — SIGNIFICANT CHANGE UP (ref 8–20)
BUN SERPL-MCNC: 19 MG/DL — SIGNIFICANT CHANGE UP (ref 8–20)
CALCIUM SERPL-MCNC: 8.8 MG/DL — SIGNIFICANT CHANGE UP (ref 8.6–10.2)
CALCIUM SERPL-MCNC: 9.2 MG/DL — SIGNIFICANT CHANGE UP (ref 8.6–10.2)
CHLORIDE SERPL-SCNC: 103 MMOL/L — SIGNIFICANT CHANGE UP (ref 98–107)
CHLORIDE SERPL-SCNC: 99 MMOL/L — SIGNIFICANT CHANGE UP (ref 98–107)
CO2 SERPL-SCNC: 21 MMOL/L — LOW (ref 22–29)
CO2 SERPL-SCNC: 23 MMOL/L — SIGNIFICANT CHANGE UP (ref 22–29)
CREAT ?TM UR-MCNC: 117 MG/DL — SIGNIFICANT CHANGE UP
CREAT SERPL-MCNC: 1.04 MG/DL — SIGNIFICANT CHANGE UP (ref 0.5–1.3)
CREAT SERPL-MCNC: 1.41 MG/DL — HIGH (ref 0.5–1.3)
GAS PNL BLDA: SIGNIFICANT CHANGE UP
GAS PNL BLDA: SIGNIFICANT CHANGE UP
GLUCOSE BLDC GLUCOMTR-MCNC: 119 MG/DL — HIGH (ref 70–99)
GLUCOSE BLDC GLUCOMTR-MCNC: 150 MG/DL — HIGH (ref 70–99)
GLUCOSE BLDC GLUCOMTR-MCNC: 153 MG/DL — HIGH (ref 70–99)
GLUCOSE BLDC GLUCOMTR-MCNC: 158 MG/DL — HIGH (ref 70–99)
GLUCOSE BLDC GLUCOMTR-MCNC: 168 MG/DL — HIGH (ref 70–99)
GLUCOSE BLDC GLUCOMTR-MCNC: 172 MG/DL — HIGH (ref 70–99)
GLUCOSE BLDC GLUCOMTR-MCNC: 184 MG/DL — HIGH (ref 70–99)
GLUCOSE BLDC GLUCOMTR-MCNC: 185 MG/DL — HIGH (ref 70–99)
GLUCOSE BLDC GLUCOMTR-MCNC: 189 MG/DL — HIGH (ref 70–99)
GLUCOSE SERPL-MCNC: 124 MG/DL — HIGH (ref 70–115)
GLUCOSE SERPL-MCNC: 154 MG/DL — HIGH (ref 70–115)
HCT VFR BLD CALC: 29.1 % — LOW (ref 39–50)
HCT VFR BLD CALC: 42.9 % — SIGNIFICANT CHANGE UP (ref 39–50)
HGB BLD-MCNC: 13.3 G/DL — SIGNIFICANT CHANGE UP (ref 13–17)
HGB BLD-MCNC: 9.4 G/DL — LOW (ref 13–17)
INR BLD: 1.53 RATIO — HIGH (ref 0.88–1.16)
MAGNESIUM SERPL-MCNC: 2.3 MG/DL — SIGNIFICANT CHANGE UP (ref 1.6–2.6)
MAGNESIUM SERPL-MCNC: 2.9 MG/DL — HIGH (ref 1.6–2.6)
MCHC RBC-ENTMCNC: 26.7 PG — LOW (ref 27–34)
MCHC RBC-ENTMCNC: 27.4 PG — SIGNIFICANT CHANGE UP (ref 27–34)
MCHC RBC-ENTMCNC: 31 GM/DL — LOW (ref 32–36)
MCHC RBC-ENTMCNC: 32.3 GM/DL — SIGNIFICANT CHANGE UP (ref 32–36)
MCV RBC AUTO: 84.8 FL — SIGNIFICANT CHANGE UP (ref 80–100)
MCV RBC AUTO: 86.1 FL — SIGNIFICANT CHANGE UP (ref 80–100)
PLATELET # BLD AUTO: 163 K/UL — SIGNIFICANT CHANGE UP (ref 150–400)
PLATELET # BLD AUTO: 306 K/UL — SIGNIFICANT CHANGE UP (ref 150–400)
POTASSIUM SERPL-MCNC: 3.8 MMOL/L — SIGNIFICANT CHANGE UP (ref 3.5–5.3)
POTASSIUM SERPL-MCNC: 4.5 MMOL/L — SIGNIFICANT CHANGE UP (ref 3.5–5.3)
POTASSIUM SERPL-SCNC: 3.8 MMOL/L — SIGNIFICANT CHANGE UP (ref 3.5–5.3)
POTASSIUM SERPL-SCNC: 4.5 MMOL/L — SIGNIFICANT CHANGE UP (ref 3.5–5.3)
PROT ?TM UR-MCNC: 8 MG/DL — SIGNIFICANT CHANGE UP (ref 0–12)
PROT SERPL-MCNC: 5.9 G/DL — LOW (ref 6.6–8.7)
PROT/CREAT UR-RTO: 0.1 RATIO — SIGNIFICANT CHANGE UP
PROTHROM AB SERPL-ACNC: 17.8 SEC — HIGH (ref 10–12.9)
RBC # BLD: 3.43 M/UL — LOW (ref 4.2–5.8)
RBC # BLD: 4.98 M/UL — SIGNIFICANT CHANGE UP (ref 4.2–5.8)
RBC # FLD: 13.2 % — SIGNIFICANT CHANGE UP (ref 10.3–14.5)
RBC # FLD: 13.4 % — SIGNIFICANT CHANGE UP (ref 10.3–14.5)
SODIUM SERPL-SCNC: 137 MMOL/L — SIGNIFICANT CHANGE UP (ref 135–145)
SODIUM SERPL-SCNC: 139 MMOL/L — SIGNIFICANT CHANGE UP (ref 135–145)
SODIUM UR-SCNC: 112 MMOL/L — SIGNIFICANT CHANGE UP
WBC # BLD: 15.67 K/UL — HIGH (ref 3.8–10.5)
WBC # BLD: 8.09 K/UL — SIGNIFICANT CHANGE UP (ref 3.8–10.5)
WBC # FLD AUTO: 15.67 K/UL — HIGH (ref 3.8–10.5)
WBC # FLD AUTO: 8.09 K/UL — SIGNIFICANT CHANGE UP (ref 3.8–10.5)

## 2020-01-14 PROCEDURE — 76775 US EXAM ABDO BACK WALL LIM: CPT | Mod: 26

## 2020-01-14 PROCEDURE — 33508 ENDOSCOPIC VEIN HARVEST: CPT

## 2020-01-14 PROCEDURE — 71045 X-RAY EXAM CHEST 1 VIEW: CPT | Mod: 26,77

## 2020-01-14 PROCEDURE — 33533 CABG ARTERIAL SINGLE: CPT

## 2020-01-14 PROCEDURE — 71045 X-RAY EXAM CHEST 1 VIEW: CPT | Mod: 26

## 2020-01-14 PROCEDURE — 99232 SBSQ HOSP IP/OBS MODERATE 35: CPT | Mod: 57

## 2020-01-14 PROCEDURE — 33519 CABG ARTERY-VEIN THREE: CPT | Mod: AS

## 2020-01-14 PROCEDURE — 33519 CABG ARTERY-VEIN THREE: CPT

## 2020-01-14 PROCEDURE — 99233 SBSQ HOSP IP/OBS HIGH 50: CPT

## 2020-01-14 PROCEDURE — 33533 CABG ARTERIAL SINGLE: CPT | Mod: AS

## 2020-01-14 PROCEDURE — 93010 ELECTROCARDIOGRAM REPORT: CPT

## 2020-01-14 RX ORDER — VANCOMYCIN HCL 1 G
1000 VIAL (EA) INTRAVENOUS EVERY 12 HOURS
Refills: 0 | Status: COMPLETED | OUTPATIENT
Start: 2020-01-14 | End: 2020-01-16

## 2020-01-14 RX ORDER — SODIUM CHLORIDE 9 MG/ML
1000 INJECTION INTRAMUSCULAR; INTRAVENOUS; SUBCUTANEOUS
Refills: 0 | Status: DISCONTINUED | OUTPATIENT
Start: 2020-01-14 | End: 2020-01-15

## 2020-01-14 RX ORDER — CHLORHEXIDINE GLUCONATE 213 G/1000ML
5 SOLUTION TOPICAL EVERY 4 HOURS
Refills: 0 | Status: DISCONTINUED | OUTPATIENT
Start: 2020-01-14 | End: 2020-01-14

## 2020-01-14 RX ORDER — POTASSIUM CHLORIDE 20 MEQ
10 PACKET (EA) ORAL
Refills: 0 | Status: COMPLETED | OUTPATIENT
Start: 2020-01-14 | End: 2020-01-14

## 2020-01-14 RX ORDER — CHLORHEXIDINE GLUCONATE 213 G/1000ML
1 SOLUTION TOPICAL DAILY
Refills: 0 | Status: DISCONTINUED | OUTPATIENT
Start: 2020-01-14 | End: 2020-01-15

## 2020-01-14 RX ORDER — NICARDIPINE HYDROCHLORIDE 30 MG/1
5 CAPSULE, EXTENDED RELEASE ORAL
Qty: 40 | Refills: 0 | Status: DISCONTINUED | OUTPATIENT
Start: 2020-01-14 | End: 2020-01-15

## 2020-01-14 RX ORDER — SODIUM CHLORIDE 9 MG/ML
500 INJECTION, SOLUTION INTRAVENOUS ONCE
Refills: 0 | Status: COMPLETED | OUTPATIENT
Start: 2020-01-14 | End: 2020-01-14

## 2020-01-14 RX ORDER — ONDANSETRON 8 MG/1
4 TABLET, FILM COATED ORAL ONCE
Refills: 0 | Status: COMPLETED | OUTPATIENT
Start: 2020-01-14 | End: 2020-01-14

## 2020-01-14 RX ORDER — ACETAMINOPHEN 500 MG
1000 TABLET ORAL ONCE
Refills: 0 | Status: COMPLETED | OUTPATIENT
Start: 2020-01-14 | End: 2020-01-15

## 2020-01-14 RX ORDER — PANTOPRAZOLE SODIUM 20 MG/1
40 TABLET, DELAYED RELEASE ORAL DAILY
Refills: 0 | Status: DISCONTINUED | OUTPATIENT
Start: 2020-01-15 | End: 2020-01-18

## 2020-01-14 RX ORDER — ASPIRIN/CALCIUM CARB/MAGNESIUM 324 MG
81 TABLET ORAL ONCE
Refills: 0 | Status: COMPLETED | OUTPATIENT
Start: 2020-01-14 | End: 2020-01-14

## 2020-01-14 RX ORDER — POTASSIUM CHLORIDE 20 MEQ
10 PACKET (EA) ORAL
Refills: 0 | Status: DISCONTINUED | OUTPATIENT
Start: 2020-01-14 | End: 2020-01-14

## 2020-01-14 RX ORDER — ALBUMIN HUMAN 25 %
100 VIAL (ML) INTRAVENOUS
Refills: 0 | Status: DISCONTINUED | OUTPATIENT
Start: 2020-01-14 | End: 2020-01-14

## 2020-01-14 RX ORDER — NOREPINEPHRINE BITARTRATE/D5W 8 MG/250ML
0.05 PLASTIC BAG, INJECTION (ML) INTRAVENOUS
Qty: 8 | Refills: 0 | Status: DISCONTINUED | OUTPATIENT
Start: 2020-01-14 | End: 2020-01-15

## 2020-01-14 RX ORDER — INSULIN HUMAN 100 [IU]/ML
2 INJECTION, SOLUTION SUBCUTANEOUS
Qty: 100 | Refills: 0 | Status: DISCONTINUED | OUTPATIENT
Start: 2020-01-14 | End: 2020-01-15

## 2020-01-14 RX ORDER — POLYETHYLENE GLYCOL 3350 17 G/17G
17 POWDER, FOR SOLUTION ORAL DAILY
Refills: 0 | Status: DISCONTINUED | OUTPATIENT
Start: 2020-01-15 | End: 2020-01-18

## 2020-01-14 RX ORDER — DEXTROSE 50 % IN WATER 50 %
25 SYRINGE (ML) INTRAVENOUS
Refills: 0 | Status: DISCONTINUED | OUTPATIENT
Start: 2020-01-14 | End: 2020-01-15

## 2020-01-14 RX ORDER — CEFUROXIME AXETIL 250 MG
1500 TABLET ORAL EVERY 8 HOURS
Refills: 0 | Status: COMPLETED | OUTPATIENT
Start: 2020-01-14 | End: 2020-01-16

## 2020-01-14 RX ORDER — ONDANSETRON 8 MG/1
4 TABLET, FILM COATED ORAL ONCE
Refills: 0 | Status: DISCONTINUED | OUTPATIENT
Start: 2020-01-14 | End: 2020-01-18

## 2020-01-14 RX ORDER — ASPIRIN/CALCIUM CARB/MAGNESIUM 324 MG
325 TABLET ORAL DAILY
Refills: 0 | Status: DISCONTINUED | OUTPATIENT
Start: 2020-01-15 | End: 2020-01-18

## 2020-01-14 RX ORDER — MUPIROCIN 20 MG/G
1 OINTMENT TOPICAL EVERY 12 HOURS
Refills: 0 | Status: COMPLETED | OUTPATIENT
Start: 2020-01-14 | End: 2020-01-15

## 2020-01-14 RX ORDER — PANTOPRAZOLE SODIUM 20 MG/1
40 TABLET, DELAYED RELEASE ORAL ONCE
Refills: 0 | Status: COMPLETED | OUTPATIENT
Start: 2020-01-14 | End: 2020-01-14

## 2020-01-14 RX ORDER — FENTANYL CITRATE 50 UG/ML
50 INJECTION INTRAVENOUS ONCE
Refills: 0 | Status: DISCONTINUED | OUTPATIENT
Start: 2020-01-14 | End: 2020-01-14

## 2020-01-14 RX ORDER — DEXTROSE 50 % IN WATER 50 %
50 SYRINGE (ML) INTRAVENOUS
Refills: 0 | Status: DISCONTINUED | OUTPATIENT
Start: 2020-01-14 | End: 2020-01-15

## 2020-01-14 RX ORDER — DESMOPRESSIN ACETATE 0.1 MG/1
26 TABLET ORAL ONCE
Refills: 0 | Status: DISCONTINUED | OUTPATIENT
Start: 2020-01-14 | End: 2020-01-14

## 2020-01-14 RX ADMIN — Medication 100 MILLIEQUIVALENT(S): at 14:30

## 2020-01-14 RX ADMIN — Medication 100 MILLIEQUIVALENT(S): at 18:35

## 2020-01-14 RX ADMIN — CHLORHEXIDINE GLUCONATE 1 APPLICATION(S): 213 SOLUTION TOPICAL at 15:20

## 2020-01-14 RX ADMIN — Medication 100 MILLIEQUIVALENT(S): at 19:10

## 2020-01-14 RX ADMIN — Medication 100 MILLIGRAM(S): at 23:19

## 2020-01-14 RX ADMIN — PANTOPRAZOLE SODIUM 40 MILLIGRAM(S): 20 TABLET, DELAYED RELEASE ORAL at 16:19

## 2020-01-14 RX ADMIN — SODIUM CHLORIDE 5 MILLILITER(S): 9 INJECTION INTRAMUSCULAR; INTRAVENOUS; SUBCUTANEOUS at 14:10

## 2020-01-14 RX ADMIN — CHLORHEXIDINE GLUCONATE 15 MILLILITER(S): 213 SOLUTION TOPICAL at 05:23

## 2020-01-14 RX ADMIN — NICARDIPINE HYDROCHLORIDE 25 MG/HR: 30 CAPSULE, EXTENDED RELEASE ORAL at 14:30

## 2020-01-14 RX ADMIN — CHLORHEXIDINE GLUCONATE 5 MILLILITER(S): 213 SOLUTION TOPICAL at 18:38

## 2020-01-14 RX ADMIN — MUPIROCIN 1 APPLICATION(S): 20 OINTMENT TOPICAL at 05:23

## 2020-01-14 RX ADMIN — MUPIROCIN 1 APPLICATION(S): 20 OINTMENT TOPICAL at 23:17

## 2020-01-14 RX ADMIN — Medication 100 MILLIEQUIVALENT(S): at 20:42

## 2020-01-14 RX ADMIN — Medication 81 MILLIGRAM(S): at 20:41

## 2020-01-14 RX ADMIN — FENTANYL CITRATE 50 MICROGRAM(S): 50 INJECTION INTRAVENOUS at 16:25

## 2020-01-14 RX ADMIN — INSULIN HUMAN 2 UNIT(S)/HR: 100 INJECTION, SOLUTION SUBCUTANEOUS at 14:10

## 2020-01-14 RX ADMIN — Medication 250 MILLIGRAM(S): at 20:41

## 2020-01-14 RX ADMIN — SODIUM CHLORIDE 3000 MILLILITER(S): 9 INJECTION, SOLUTION INTRAVENOUS at 18:00

## 2020-01-14 RX ADMIN — Medication 100 MILLIGRAM(S): at 16:13

## 2020-01-14 RX ADMIN — SODIUM CHLORIDE 1000 MILLILITER(S): 9 INJECTION, SOLUTION INTRAVENOUS at 16:45

## 2020-01-14 RX ADMIN — ONDANSETRON 4 MILLIGRAM(S): 8 TABLET, FILM COATED ORAL at 23:23

## 2020-01-14 RX ADMIN — INSULIN HUMAN 2 UNIT(S)/HR: 100 INJECTION, SOLUTION SUBCUTANEOUS at 23:21

## 2020-01-14 RX ADMIN — PANTOPRAZOLE SODIUM 40 MILLIGRAM(S): 20 TABLET, DELAYED RELEASE ORAL at 05:23

## 2020-01-14 RX ADMIN — Medication 25 MILLIGRAM(S): at 05:23

## 2020-01-14 RX ADMIN — FENTANYL CITRATE 50 MICROGRAM(S): 50 INJECTION INTRAVENOUS at 16:10

## 2020-01-14 RX ADMIN — CHLORHEXIDINE GLUCONATE 1 APPLICATION(S): 213 SOLUTION TOPICAL at 05:26

## 2020-01-14 RX ADMIN — SODIUM CHLORIDE 10 MILLILITER(S): 9 INJECTION INTRAMUSCULAR; INTRAVENOUS; SUBCUTANEOUS at 14:10

## 2020-01-14 NOTE — PROGRESS NOTE ADULT - SUBJECTIVE AND OBJECTIVE BOX
Cardiac Surgery Pre-op Note:    Patient is a 54y old  Male who presents with a chief complaint of chest pain (13 Jan 2020 13:28)      HPI:  HPI: 55 y/o male with recent intermittent chest pain with exertion, and chest heaviness resolving with rest.  Saw his PCP whom he had not seen x10 years who then referred him to cardiologist.     States daily compliance with aspirin 81 mg po.      ROS: Denies cough, palpitation, DIETRICH, lightheadedness, or near syncope/syncope    Co Morbidities:  +FH CAD premature, HTN, HLD, non compliance, angina, former smoker, /s/p nephrectomy for donor status      ANGINAL/ISCHEMIC SS: Class III    ECHO: (WITHIN 6 MONTHS) Dec 2019                                                NON INVASIVE TESTING: not rendered    LHC: 2006 prior to nephrectomy    ANTIANGINAL MEDICATIONS: None    ASA 2  MALLAMPATI 2  BR 0.8%    Attending physical exam:    GENERAL: Well-developed, well-nourished patient in no acute distress.  HEENT: Normocephalic, atraumatic, PERRLA, anicteric sclera, conjunctiva without injection  Neck: Supple, no carotid bruits bilaterally, no JVD  Chest: Clear to auscultation bilaterally without wheezes or rhonchi  Cardiac: Regular rate and rhythm S1-S2 without gallops murmurs or rubs  Abdomen: The abdomen is soft, nontender and nondistended.  Positive bowel sounds.  There are no masses appreciated.  Extremities: The extremities are warm and well-perfused.  There is no cyanosis clubbing or edema  Vascular: Carotid, radial, and femoral pulses are 2-3+ bilaterally.  Dorsalis pedis and posterior tibial artery pulses are 1-2+ bilaterally.   Neuro: The patient is neurologically intact.  There are no gross motor deficits.  Gait normal.  Psychiatric: The patient is alert and oriented X3, mood and affect are appropriate  Skin: Without rashes                                                                                                              Surgeon:  Dr. Ann  Procedure:  CABG    PAST MEDICAL & SURGICAL HISTORY:  Statin intolerance: elevated liver enzymes  Carotid disease, bilateral: mild  HLD (hyperlipidemia)  HTN (hypertension)  Non-compliance: with medical care  AP (angina pectoris)  Obesity  H/O elbow surgery: right  S/p nephrectomy: left side for donor status      Allergies    No Known Allergies    Intolerances  Statins-elevated liver enzymes  ohs (Unknown)    MEDICATIONS  (STANDING):  aspirin  chewable 81 milliGRAM(s) Oral daily  cefuroxime  IVPB 1500 milliGRAM(s) IV Intermittent once  chlorhexidine 0.12% Liquid 15 milliLiter(s) Oral Mucosa two times a day  chlorhexidine 4% Liquid 1 Application(s) Topical two times a day  ezetimibe 10 milliGRAM(s) Oral daily  insulin lispro (HumaLOG) corrective regimen sliding scale   SubCutaneous three times a day before meals  insulin lispro (HumaLOG) corrective regimen sliding scale   SubCutaneous at bedtime  metoprolol tartrate 25 milliGRAM(s) Oral every 12 hours  multiple electrolytes Injection Type 1 1000 milliLiter(s) (100 mL/Hr) IV Continuous <Continuous>  mupirocin 2% Ointment 1 Application(s) Topical two times a day  pantoprazole    Tablet 40 milliGRAM(s) Oral before breakfast  vancomycin  IVPB 1000 milliGRAM(s) IV Intermittent once    MEDICATIONS  (PRN):  acetaminophen   Tablet .. 650 milliGRAM(s) Oral every 6 hours PRN Mild Pain (1 - 3)  aluminum hydroxide/magnesium hydroxide/simethicone Suspension 30 milliLiter(s) Oral every 4 hours PRN Dyspepsia  ondansetron Injectable 4 milliGRAM(s) IV Push every 8 hours PRN Nausea and/or Vomiting        Labs:                        13.9   7.82  )-----------( 255      ( 13 Jan 2020 04:04 )             44.7     01-13    135  |  102  |  18.0  ----------------------------<  157<H>  4.1   |  22.0  |  1.25    Ca    9.1      13 Jan 2020 11:18  Mg     2.2     01-13    TPro  7.7  /  Alb  4.2  /  TBili  0.4  /  DBili  x   /  AST  46<H>  /  ALT  74<H>  /  AlkPhos  109  01-13    LIVER FUNCTIONS - ( 13 Jan 2020 11:18 )  Alb: 4.2 g/dL / Pro: 7.7 g/dL / ALK PHOS: 109 U/L / ALT: 74 U/L / AST: 46 U/L / GGT: x                 Hemoglobin A1C, Whole Blood: 8.6 % (01-10 @ 12:33)    Prealbumin, Serum: 25 mg/dL (01-10 @ 13:33)    Serum Pro-Brain Natriuretic Peptide: 130 pg/mL (01-10 @ 13:33)    01-10 @ 13:33  TSH 1.12  Free Thyroxine --  T4 7.6  Triiodothyronine, Total (T3 Total) 91    MRSA PCR Result.: NotDetec (01-10 @ 16:10)            Diagnostic Tests  CXR:  No evidence of active chest disease.   EKG:  Normal ECG  Carotid Duplex:  No hemodynamically significant stenosis  PFT's:  Not completed  Echocardiogram:  EF > 75%, trace MR  Cardiac catheterization:  Severe native three vessel CAD with preserved LV function      Pt has an AICD/PPM?: No   intra-op BLAIRE ordered?:  Yes  Pre-op Beta Blocker ordered within 24 hrs of surgery? Yes   Pre-op Hibiclens and peridex ordered (BID x 2 days preferred)?: Yes   Type & Cross done: yes  NPO after Midnight? Yes  Pre-op antibiotics ordered and with/on chart: Yes  Consent obtained: Yes Pending

## 2020-01-14 NOTE — PROGRESS NOTE ADULT - ASSESSMENT
54 year old man with history of HTN, HLD, nephrolithiasis, s/p left donor nephrectomy presented with chest pain.  s/p LHC on 01/10 significant for multivessel disease. Pt scheduled for OR for CABG; cancelled due to AUBREE.    He had a nephrectomy in 2007; donated left kidney to his mother in law. He has never followed with a nephrologist.    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  s/p LHC on 01/10 significant for multivessel disease. Pt scheduled for OR for CABG; cancelled due to AUBREE.    He had a nephrectomy in 2007; donated left kidney to his mother in law. He has never followed with a nephrologist.    1) Solitary Rt  kidney s/p donor left nephrectomy  2) AUBREE  3) Multivessel CAD  4) HTN    Pt with normal baseline Scr, now with AUBREE  Timing of rise in Scr suggests MARCIA      Continue IV hydration    UA with trace bacteria, ketone, blood and glucose  Repeat UA, TP/Cr ratio 54 year old man with history of HTN, HLD, nephrolithiasis, s/p left donor nephrectomy presented with chest pain.  s/p LHC on 01/10 significant for multivessel disease. Pt scheduled for OR for CABG; cancelled due to AUBREE.    He had a nephrectomy in 2007; donated left kidney to his mother in law. He has never followed with a nephrologist.    1) Solitary Rt  kidney s/p donor left nephrectomy  2) AUBREE  3) Multivessel CAD  4) HTN    Pt with normal baseline Scr, now with AUBREE  Timing of rise in Scr suggests MARCIA    Continue IV hydration    UA with trace bacteria, ketone, blood and glucose  Repeat UA, TP/Cr ratio    I was Physically Present for the key portions of the Evaluation & management ( E/M ) Service provided.    I agree with the above History  , Physical examination & Treatment Plans,    I have Reviewed , Modified or appended where appropriate,

## 2020-01-14 NOTE — BRIEF OPERATIVE NOTE - COMMENTS
No qualified resident was available to assist in this case.  LEAH Ray personally first assisted the Cardiothoracic Surgeon listed in this brief op note throughout the entirety of this case.

## 2020-01-14 NOTE — PROGRESS NOTE ADULT - ASSESSMENT
HPI: 55 y/o male with recent intermittent chest pain with exertion, and chest heaviness resolving with rest.  Saw his PCP whom he had not seen x10 years who then referred him to cardiologist.     States daily compliance with aspirin 81 mg po.    Initially pt was scheduled for OR 1/13, surgery postponed secondary to elevated Cr.  Pt has solo kidney, he underwent donor nephrectomy in 2006.  Nephrology consulted, pt hydrated, Cr back to baseline and pt now cleared for surgery later today 1/14.

## 2020-01-14 NOTE — BRIEF OPERATIVE NOTE - NSICDXBRIEFPROCEDURE_GEN_ALL_CORE_FT
PROCEDURES:  CABG, with endoscopic vein harvesting 15-Jama-2020 15:27:10 CABGx4 LIMA-LAD, Vein-Vein-RPDA, AUTUMN, Lesag  Endoscopic procurement of greater saphenous vein from right lower extremity Daryl Marrero

## 2020-01-14 NOTE — DIETITIAN INITIAL EVALUATION ADULT. - OTHER INFO
Pt is a 54 year old male with recent intermittent chest pain with exertion, and chest heaviness resolving with rest. without radiation. No SOB. Pt went to his PCP whom he had not seen x10 years who then referred him to cardiologist.    States daily compliance with aspirin 81 +FH CAD premature, HTN, HLD, non compliance, angina, former smoker, s/p nephrectomy for donor status. Cardiac catheterization with severe CAD for which CABG is planned for later today, pt remains NPO at this time. Pt on way to OR during visit, RD to follow up with full interview post op.

## 2020-01-14 NOTE — DIETITIAN INITIAL EVALUATION ADULT. - PERTINENT LABORATORY DATA
01-14 Na137 mmol/L Glu 124 mg/dL<H> K+ 3.8 mmol/L Cr  1.41 mg/dL<H> BUN 19.0 mg/dL Phos n/a   Alb n/a   PAB n/a

## 2020-01-14 NOTE — PROGRESS NOTE ADULT - SUBJECTIVE AND OBJECTIVE BOX
[facility]                                                    [address], [address], [place] [address]                                                         PHONE: [phone]    FAX: [phone] [address], [address], [place] [address]                                                 PHONE: [phone]    FAX: [phone]  *******************************************************************************  cc: CAD for CABG    HPI: 55 y/o male with recent intermittent chest pain with exertion, and chest heaviness resolving with rest. without radiation. No SOB. Pt went to his PCP whom he had not seen x10 years who then referred him to cardiologist.    States daily compliance with aspirin 81 +FH CAD premature, HTN, HLD, non compliance, angina, former smoker/s/p nephrectomy for donor status. Cardiac catheterization with severe CAD for which CABG is planned      Overnight events/Subjective Assessment: no new complaints. No CO, SOB, palpitations, PND or orthopnea. No LE edema    INTERPRETATION OF TELEMETRY (personally reviewed): SR    PAST MEDICAL & SURGICAL HISTORY:  Statin intolerance: elevated liver enzymes  Carotid disease, bilateral: mild  HLD (hyperlipidemia)  HTN (hypertension)  Non-compliance: with medical care  AP (angina pectoris)  Obesity  H/O elbow surgery: right  S/p nephrectomy: left side for donor status      No Known Allergies  ohs (Unknown)      MEDICATIONS  (STANDING):  aspirin  chewable 81 milliGRAM(s) Oral daily  cefuroxime  IVPB 1500 milliGRAM(s) IV Intermittent once  chlorhexidine 0.12% Liquid 15 milliLiter(s) Oral Mucosa two times a day  chlorhexidine 4% Liquid 1 Application(s) Topical two times a day  ezetimibe 10 milliGRAM(s) Oral daily  insulin lispro (HumaLOG) corrective regimen sliding scale   SubCutaneous three times a day before meals  insulin lispro (HumaLOG) corrective regimen sliding scale   SubCutaneous at bedtime  metoprolol tartrate 25 milliGRAM(s) Oral every 12 hours  multiple electrolytes Injection Type 1 1000 milliLiter(s) (100 mL/Hr) IV Continuous <Continuous>  mupirocin 2% Ointment 1 Application(s) Topical two times a day  pantoprazole    Tablet 40 milliGRAM(s) Oral before breakfast  vancomycin  IVPB 1000 milliGRAM(s) IV Intermittent once    MEDICATIONS  (PRN):  acetaminophen   Tablet .. 650 milliGRAM(s) Oral every 6 hours PRN Mild Pain (1 - 3)  aluminum hydroxide/magnesium hydroxide/simethicone Suspension 30 milliLiter(s) Oral every 4 hours PRN Dyspepsia  ondansetron Injectable 4 milliGRAM(s) IV Push every 8 hours PRN Nausea and/or Vomiting      Vital Signs Last 24 Hrs  T(C): 36.8 (14 Jan 2020 05:00), Max: 36.9 (13 Jan 2020 20:00)  T(F): 98.3 (14 Jan 2020 05:00), Max: 98.5 (13 Jan 2020 20:00)  HR: 68 (14 Jan 2020 08:00) (67 - 83)  BP: 115/89 (14 Jan 2020 08:00) (99/57 - 160/92)  BP(mean): 99 (14 Jan 2020 08:00) (72 - 118)  RR: 25 (14 Jan 2020 08:00) (13 - 25)  SpO2: 100% (14 Jan 2020 08:00) (96% - 100%)    I&O's Detail    13 Jan 2020 07:01  -  14 Jan 2020 07:00  --------------------------------------------------------  IN:    multiple electrolytes Injection Type 1: 2400 mL    Oral Fluid: 1230 mL  Total IN: 3630 mL    OUT:    Voided: 3200 mL  Total OUT: 3200 mL    Total NET: 430 mL      14 Jan 2020 07:01  -  14 Jan 2020 08:09  --------------------------------------------------------  IN:    multiple electrolytes Injection Type 1: 100 mL  Total IN: 100 mL    OUT:  Total OUT: 0 mL    Total NET: 100 mL        I&O's Summary    13 Jan 2020 07:01  -  14 Jan 2020 07:00  --------------------------------------------------------  IN: 3630 mL / OUT: 3200 mL / NET: 430 mL    14 Jan 2020 07:01  -  14 Jan 2020 08:09  --------------------------------------------------------  IN: 100 mL / OUT: 0 mL / NET: 100 mL            PHYSICAL EXAM:  General: Appears well developed, well nourished, no acute distress. not in acute pain  HEAD: normal cephalic. Atraumatic  PUPILS: equal and reactive to light  EARS: normal hearing  NECK: supple. no JVD or HJR. no carotid bruits. no visible lymphadenopathy  NOSE: no gross abnormalities  CHEST: symmetric chest wall expansion  CARDIOVASCULAR: Normal rate. Regular rhythm. Normal S1 and S2, no S3/S4,  no murmur, rub, or gallop  LUNGS: Normal effort. Normal respiratory rate. Breath sounds are clear to auscultation bilaterally. No respiratory distress. No stridor.  no rales, rhonchi or wheeze. no decreased Breath sounds  ABDOMEN: Soft, nontender, non-distended, positive bowel sounds, no mass or bruit. no abdominal tenderness. No rebound. no ascites  EXTREMITIES: No clubbing, cyanosis or edema. normal range of motion  PULSES:  distal pulses WNL  SKIN: Warm and dry with normal turgor. no visible rash or cyanosis   NEURO: Alert & oriented x 3, grossly intact with no focal weakness  PSYCH: normal mood and affect. Grossly normal insight and judgement exhibited    FAMILY HISTORY: premature CAD      SOCIAL HISTORY: former smoking. No ETOH/No IVDA    REVIEW OF SYSTEMS:  Constitutional: no fever, chills or malaise. No weight loss  Head: no trauma  Eyes: no visual deficit. No double vision  Ears: no hearing deficit or ringing in the ears  Nose: no nose bleeds or smell changes or congestion  Throat: no difficult swallowing or painful swallowing  Neck: supple. No lymphadenopathy or swelling  Respiratory: no SOB, wheeze, asthma, COPD. No cough. No blood in the sputum  Cardiovascular: + CP, no palpitations, irregular heart beats. No edema. No PND. No orthopnea. No skin/temperature or color changes  Gastrointestinal: no abdominal pain. No constipation. No diarrhea. No melena. No nausea. No vomiting. No bloating  Genitourinary: no frequency or urgency. No hematuria  Lymphatics: no grossly swollen lymph nodes  Musculoskeletal: no limitation of range of motion. Normal strength. No pain  Integumentary: no visible rash. No itching  Neurologic: no HA. No TIA or stroke symptoms. No seizure. No hx of epilepsy. No tingling or numbness. No weakness. No dizziness  Psychiatric: denied. Reports appropriate mood.        LABS:                        13.3   8.09  )-----------( 306      ( 14 Jan 2020 02:38 )             42.9     01-14    137  |  99  |  19.0  ----------------------------<  124<H>  3.8   |  23.0  |  1.41<H>    Ca    8.8      14 Jan 2020 02:38  Mg     2.3     01-14    TPro  7.7  /  Alb  4.2  /  TBili  0.4  /  DBili  x   /  AST  46<H>  /  ALT  74<H>  /  AlkPhos  109  01-13          Serum Pro-Brain Natriuretic Peptide: 130 pg/mL (01-10 @ 13:33)  serum  Lipids:   Hemoglobin A1C, Whole Blood: 8.6 % (01-10 @ 12:33)    Thyroid Stimulating Hormone, Serum: 1.12 uIU/mL (01-10 @ 13:33)      < from: US Duplex Carotid Arteries Complete, Bilateral (01.10.20 @ 21:03) >  IMPRESSION:     1. 1.2 cm left lobe thyroid nodule. Dedicated thyroid ultrasound nonemergent basis.  2. No hemodynamically significant stenosis.     < end of copied text >  RADIOLOGY & ADDITIONAL STUDIES:    < from: US Renal (01.14.20 @ 07:25) >  FINDINGS:    Right kidney:  13 cm. No renal mass, hydronephrosis or calculi. Increased echogenicity renal parenchyma parenchymal thinning compatible with medical renal disease.    Left kidney: Prior left nephrectomy. No residual or recurrent masses in the nephrectomy bed.    Incidental note of hepatic steatosis.    IMPRESSION:     Prior left hip fracture mean. Neck renal disease of the right kidney. No hydronephrosis.    < end of copied text >        ECHO: < from: TTE Echo Complete w/Doppler (01.10.20 @ 17:26) >  Summary:   1. Technically good study.   2. Hyperdynamic global left ventricular systolic function.   3. Left ventricular ejection fraction, by visual estimation, is >75%.   4. Spectral Doppler shows impaired relaxation pattern of left ventricular myocardial filling (Grade I diastolic dysfunction).   5. Mild thickening of the anterior mitral valve leaflet.   6. Trace mitral valve regurgitation.   7. Trivial pericardial effusion.    < end of copied text >        CARDIAC CATHETERIZATION: < from: Cardiac Cath Lab - Adult (01.10.20 @ 11:59) >  VENTRICLES: There were no left ventricular global or regional wall motion  abnormalities. EF calculated by contrast ventriculography was 60 %.  CORONARY VESSELS: The coronary circulation is right dominant.  LM:   --  LM: Angiography showed minor luminal irregularities with no flow  limiting lesions.  LAD:   --  Mid LAD: The distal vessel was supplied by extensive collaterals  from the third obtuse marginal. There was a 100 % stenosis.  --  D1: There was a discrete 90 % stenosis at the ostium of the vessel  segment. The lesion was hazy, complex, and eccentric. There was ANDRY grade  3 flow through the vessel (brisk flow).  CX:   --  OM2: The vessel was large sized. There was a tubular 100 %  stenosis in the proximal third of the vessel segment. There was ANDRY grade  0 flow through the vessel (no flow).  RCA:   --  Proximal RCA: There was a tubular 99 % stenosis. The lesion was  hazy, complex,and eccentric. There was ANDRY grade 1 flow through the  vessel (slow flow without perfusion).  --  RPDA: The distal vessel was supplied by extensive collaterals from the  third obtuse marginal.  ARCH VESSELS: LIMA: Normal.  COMPLICATIONS: No complications occurred during the cath lab visit.  DIAGNOSTIC IMPRESSIONS: Unstable angina with minimal exertion (CCS cl III)  Severe native three vessel CAD with preserved LV function  The LIMA is widely patent  DIAGNOSTIC RECOMMENDATIONS: Admit to Dr Ann for 4V CABG (LAD,D1, OM2,  RPDA)  Gentle hydration  Agrressive antihypertensive (BB, ACEI) and lipid-lowering therapy  (atorvastatin 80 mg daily)  Will follow  INTERVENTIONAL IMPRESSIONS: Unstable angina with minimal exertion (CCS cl  III)  Severenative three vessel CAD with preserved LV function  The LIMA is widely patent  INTERVENTIONAL RECOMMENDATIONS: Admit to Dr Ann for 4V CABG (LAD,D1,  OM2, RPDA)    < end of copied text >      ASSESSMENT AND PLAN:  In summary, MARGOT JESUS is a 54y Male with past medical history significant for presentation with intermittent chest pain with exertion, and chest heaviness resolving with rest. without radiation. No SOB. Pt went to his PCP whom he had not seen x10 years who then referred him to cardiologist.    States daily compliance with aspirin 81 +FH CAD premature, HTN, HLD, non compliance, angina, former smoker/s/p nephrectomy for donor status. Cardiac catheterization with severe CAD for which CABG is planned    - Severe multivessel CAD. Plan for CABG today. alll questions answered. on ASA    - HTN. metoprolol    - HL. on zetia. eventual statin    - Telemetry monitoring personally reviewed by me. SR    - Echocardiogram imaging reviewed by me    - radiologic imaging reviewed    - Laboratory data reviewed.    - I spoke with family member at the bedside and nursing at the bedside    - I have personally reviewed all obtainable prior records and data      Kiarra Salazar MD

## 2020-01-14 NOTE — PROGRESS NOTE ADULT - SUBJECTIVE AND OBJECTIVE BOX
[facility] DIVISION OF KIDNEY DISEASES AND HYPERTENSION -- FOLLOW UP NOTE  --------------------------------------------------------------------------------  Chief Complaint: AUBREE    24 hour events/subjective:      PAST HISTORY  --------------------------------------------------------------------------------  No significant changes to PMH, PSH, FHx, SHx, unless otherwise noted    ALLERGIES & MEDICATIONS  --------------------------------------------------------------------------------  Allergies  No Known Allergies    Intolerances  ohs (Unknown)    Standing Inpatient Medications  albumin human 25% IVPB 100 milliLiter(s) IV Intermittent every 1 hour  cefuroxime  IVPB 1500 milliGRAM(s) IV Intermittent once  desmopressin IVPB 26 MICROGram(s) IV Intermittent once  vancomycin  IVPB 1000 milliGRAM(s) IV Intermittent once    PRN Inpatient Medications    REVIEW OF SYSTEMS  --------------------------------------------------------------------------------  Gen: No weight changes, fatigue, fevers/chills, weakness  Skin: No rashes  Head/Eyes/Ears/Mouth: No headache; Normal hearing; Normal vision w/o blurriness  Respiratory: No dyspnea, cough, wheezing, hemoptysis  CV: No chest pain, PND, orthopnea  GI: No abdominal pain, diarrhea, constipation, nausea, vomiting, melena, hematochezia  : No increased frequency, dysuria, hematuria, nocturia  MSK: No joint pain/swelling; no back pain; no edema  Neuro: No dizziness/lightheadedness, weakness, seizures, numbness, tingling  Heme: No easy bruising or bleeding  Endo: No heat/cold intolerance  Psych: No significant nervousness, anxiety, stress, depression    All other systems were reviewed and are negative, except as noted.    VITALS/PHYSICAL EXAM  --------------------------------------------------------------------------------  T(C): 37 (01-14-20 @ 08:00), Max: 37 (01-14-20 @ 08:00)  HR: 68 (01-14-20 @ 08:00) (67 - 83)  BP: 115/89 (01-14-20 @ 08:00) (100/58 - 160/92)  RR: 25 (01-14-20 @ 08:00) (13 - 25)  SpO2: 100% (01-14-20 @ 08:00) (96% - 100%)  Wt(kg): --  Height (cm): 170 (01-13-20 @ 04:31)  Weight (kg): 87.1 (01-13-20 @ 04:31)  BMI (kg/m2): 30.1 (01-13-20 @ 04:31)  BSA (m2): 1.99 (01-13-20 @ 04:31)      01-13-20 @ 07:01  -  01-14-20 @ 07:00  --------------------------------------------------------  IN: 3630 mL / OUT: 3200 mL / NET: 430 mL    01-14-20 @ 07:01  -  01-14-20 @ 09:46  --------------------------------------------------------  IN: 100 mL / OUT: 0 mL / NET: 100 mL      Physical Exam:  	Gen: NAD, well-appearing  	HEENT: supple neck  	Pulm: CTA B/L  	CV: RRR, S1S2; no rub  	Back: No spinal or CVA tenderness; no sacral edema  	Abd: +BS, soft, nontender/nondistended  	: No suprapubic tenderness  	UE: Warm,  no edema; no asterixis  	LE: Warm, no edema  	Neuro: No focal deficits  	Psych: Normal affect and mood  	Skin: Warm, without rashes  	Vascular access: none      LABS/STUDIES  --------------------------------------------------------------------------------              13.3   8.09  >-----------<  306      [01-14-20 @ 02:38]              42.9     137  |  99  |  19.0  ----------------------------<  124      [01-14-20 @ 02:38]  3.8   |  23.0  |  1.41        Ca     8.8     [01-14-20 @ 02:38]      Mg     2.3     [01-14-20 @ 02:38]    TPro  7.7  /  Alb  4.2  /  TBili  0.4  /  DBili  x   /  AST  46  /  ALT  74  /  AlkPhos  109  [01-13-20 @ 11:18]    Creatinine Trend:  SCr 1.41 [01-14 @ 02:38]  SCr 1.25 [01-13 @ 11:18]  SCr 1.36 [01-13 @ 04:04]  SCr 0.99 [01-10 @ 13:33]  SCr 1.17 [01-10 @ 10:50]    Urinalysis - [01-11-20 @ 18:30]      Color Yellow / Appearance Clear / SG 1.020 / pH 6.0      Gluc 250 / Ketone Trace  / Bili Negative / Urobili Negative       Blood Trace / Protein Negative / Leuk Est Negative / Nitrite Negative      RBC 0-2 / WBC 0-2 / Hyaline  / Gran  / Sq Epi  / Non Sq Epi Occasional / Bacteria Occasional    Urine Creatinine 117      [01-14-20 @ 07:36]  Urine Protein 8.0      [01-14-20 @ 07:36]  Urine Sodium 112      [01-14-20 @ 07:36]  Protein/Creatinine Ratio, Urine  0.1 (01.14.20 @ 07:36)      HbA1c 8.6      [01-10-20 @ 12:33]  TSH 1.12      [01-10-20 @ 13:33]  Lipid: chol 431, , HDL 33,       [01-10-20 @ 10:50]    HCV 0.06, Nonreact      [01-10-20 @ 22:04]  HIV Nonreact      [01-10-20 @ 10:50]        < from: US Renal (01.14.20 @ 07:25) >     EXAM:  US KIDNEY(S)                          PROCEDURE DATE:  01/14/2020      INTERPRETATION:  CLINICAL INFORMATION: Rising creatinine    COMPARISON: CT abdomen pelvis of 4/12/2015    TECHNIQUE: Sonography of the kidneys and bladder.     FINDINGS:    Right kidney:  13 cm. No renal mass, hydronephrosis or calculi. Increased echogenicity renal parenchyma parenchymal thinning compatible with medical renal disease.    Left kidney: Prior left nephrectomy. No residual or recurrent masses in the nephrectomy bed.    Incidental note of hepatic steatosis.    IMPRESSION:     Prior left hip fracture mean. Neck renal disease of the right kidney. No hydronephrosis.      TO PALMA M.D., ATTENDING RADIOLOGIST  This document has been electronically signed. Jan 14 2020  7:39AM [facility] DIVISION OF KIDNEY DISEASES AND HYPERTENSION -- FOLLOW UP NOTE  --------------------------------------------------------------------------------  Chief Complaint: AUBREE    24 hour events/subjective:  CABG today in OR      PAST HISTORY  --------------------------------------------------------------------------------  No significant changes to PMH, PSH, FHx, SHx, unless otherwise noted    ALLERGIES & MEDICATIONS  --------------------------------------------------------------------------------  Allergies  No Known Allergies    Intolerances  ohs (Unknown)    Standing Inpatient Medications  albumin human 25% IVPB 100 milliLiter(s) IV Intermittent every 1 hour  cefuroxime  IVPB 1500 milliGRAM(s) IV Intermittent once  desmopressin IVPB 26 MICROGram(s) IV Intermittent once  vancomycin  IVPB 1000 milliGRAM(s) IV Intermittent once    PRN Inpatient Medications    REVIEW OF SYSTEMS  --------------------------------------------------------------------------------  Gen: No weight changes, fatigue, fevers/chills, weakness  Skin: No rashes  Head/Eyes/Ears/Mouth: No headache; Normal hearing; Normal vision w/o blurriness  Respiratory: No dyspnea, cough, wheezing, hemoptysis  CV: No chest pain, PND, orthopnea  GI: No abdominal pain, diarrhea, constipation, nausea, vomiting, melena, hematochezia  : No increased frequency, dysuria, hematuria, nocturia  MSK: No joint pain/swelling; no back pain; no edema  Neuro: No dizziness/lightheadedness, weakness, seizures, numbness, tingling  Heme: No easy bruising or bleeding  Endo: No heat/cold intolerance  Psych: No significant nervousness, anxiety, stress, depression    All other systems were reviewed and are negative, except as noted.    VITALS/PHYSICAL EXAM  --------------------------------------------------------------------------------  T(C): 37 (01-14-20 @ 08:00), Max: 37 (01-14-20 @ 08:00)  HR: 68 (01-14-20 @ 08:00) (67 - 83)  BP: 115/89 (01-14-20 @ 08:00) (100/58 - 160/92)  RR: 25 (01-14-20 @ 08:00) (13 - 25)  SpO2: 100% (01-14-20 @ 08:00) (96% - 100%)    Height (cm): 170 (01-13-20 @ 04:31)  Weight (kg): 87.1 (01-13-20 @ 04:31)  BMI (kg/m2): 30.1 (01-13-20 @ 04:31)  BSA (m2): 1.99 (01-13-20 @ 04:31)      01-13-20 @ 07:01  -  01-14-20 @ 07:00  --------------------------------------------------------  IN: 3630 mL / OUT: 3200 mL / NET: 430 mL    01-14-20 @ 07:01  -  01-14-20 @ 09:46  --------------------------------------------------------  IN: 100 mL / OUT: 0 mL / NET: 100 mL      Physical Exam:  	Gen: NAD, well-appearing  	HEENT: supple neck  	Pulm: CTA B/L  	CV: RRR, S1S2; no rub  	Back: No spinal or CVA tenderness; no sacral edema  	Abd: +BS, soft, nontender/nondistended  	: No suprapubic tenderness  	UE: Warm,  no edema; no asterixis  	LE: Warm, no edema  	Neuro: No focal deficits  	Psych: Normal affect and mood  	Skin: Warm, without rashes  	Vascular access: none      LABS/STUDIES  --------------------------------------------------------------------------------              13.3   8.09  >-----------<  306      [01-14-20 @ 02:38]              42.9     137  |  99  |  19.0  ----------------------------<  124      [01-14-20 @ 02:38]  3.8   |  23.0  |  1.41        Ca     8.8     [01-14-20 @ 02:38]      Mg     2.3     [01-14-20 @ 02:38]    TPro  7.7  /  Alb  4.2  /  TBili  0.4  /  DBili  x   /  AST  46  /  ALT  74  /  AlkPhos  109  [01-13-20 @ 11:18]    Creatinine Trend:  SCr 1.41 [01-14 @ 02:38]  SCr 1.25 [01-13 @ 11:18]  SCr 1.36 [01-13 @ 04:04]  SCr 0.99 [01-10 @ 13:33]  SCr 1.17 [01-10 @ 10:50]    Urinalysis - [01-11-20 @ 18:30]      Color Yellow / Appearance Clear / SG 1.020 / pH 6.0      Gluc 250 / Ketone Trace  / Bili Negative / Urobili Negative       Blood Trace / Protein Negative / Leuk Est Negative / Nitrite Negative      RBC 0-2 / WBC 0-2 / Hyaline  / Gran  / Sq Epi  / Non Sq Epi Occasional / Bacteria Occasional    Urine Creatinine 117      [01-14-20 @ 07:36]  Urine Protein 8.0      [01-14-20 @ 07:36]  Urine Sodium 112      [01-14-20 @ 07:36]  Protein/Creatinine Ratio, Urine  0.1 (01.14.20 @ 07:36)      HbA1c 8.6      [01-10-20 @ 12:33]  TSH 1.12      [01-10-20 @ 13:33]  Lipid: chol 431, , HDL 33,       [01-10-20 @ 10:50]    HCV 0.06, Nonreact      [01-10-20 @ 22:04]  HIV Nonreact      [01-10-20 @ 10:50]        < from: US Renal (01.14.20 @ 07:25) >     EXAM:  US KIDNEY(S)                          PROCEDURE DATE:  01/14/2020      INTERPRETATION:  CLINICAL INFORMATION: Rising creatinine    COMPARISON: CT abdomen pelvis of 4/12/2015    TECHNIQUE: Sonography of the kidneys and bladder.     FINDINGS:    Right kidney:  13 cm. No renal mass, hydronephrosis or calculi. Increased echogenicity renal parenchyma parenchymal thinning compatible with medical renal disease.    Left kidney: Prior left nephrectomy. No residual or recurrent masses in the nephrectomy bed.    Incidental note of hepatic steatosis.    IMPRESSION:     Prior left hip fracture mean. Neck renal disease of the right kidney. No hydronephrosis.      TO PALMA M.D., ATTENDING RADIOLOGIST  This document has been electronically signed. Jan 14 2020  7:39AM [facility] DIVISION OF KIDNEY DISEASES AND HYPERTENSION -- FOLLOW UP NOTE  --------------------------------------------------------------------------------  Chief Complaint: AUBREE    24 hour events/subjective: In OR,     PAST HISTORY  --------------------------------------------------------------------------------  No significant changes to PMH, PSH, FHx, SHx, unless otherwise noted    ALLERGIES & MEDICATIONS  --------------------------------------------------------------------------------  Allergies  No Known Allergies    Intolerances  ohs (Unknown)    Standing Inpatient Medications  albumin human 25% IVPB 100 milliLiter(s) IV Intermittent every 1 hour  cefuroxime  IVPB 1500 milliGRAM(s) IV Intermittent once  desmopressin IVPB 26 MICROGram(s) IV Intermittent once  vancomycin  IVPB 1000 milliGRAM(s) IV Intermittent once    PRN Inpatient Medications    REVIEW OF SYSTEMS : NA,   --------------------------------------------------------------------------------  VITALS/PHYSICAL EXAM  --------------------------------------------------------------------------------  T(C): 37 (01-14-20 @ 08:00), Max: 37 (01-14-20 @ 08:00)  HR: 68 (01-14-20 @ 08:00) (67 - 83)  BP: 115/89 (01-14-20 @ 08:00) (100/58 - 160/92)  RR: 25 (01-14-20 @ 08:00) (13 - 25)  SpO2: 100% (01-14-20 @ 08:00) (96% - 100%)    Height (cm): 170 (01-13-20 @ 04:31)  Weight (kg): 87.1 (01-13-20 @ 04:31)  BMI (kg/m2): 30.1 (01-13-20 @ 04:31)  BSA (m2): 1.99 (01-13-20 @ 04:31)    01-13-20 @ 07:01  -  01-14-20 @ 07:00  --------------------------------------------------------  IN: 3630 mL / OUT: 3200 mL / NET: 430 mL    01-14-20 @ 07:01  -  01-14-20 @ 09:46  --------------------------------------------------------  IN: 100 mL / OUT: 0 mL / NET: 100 mL    Physical Exam: Not  Done,   	  LABS/STUDIES  --------------------------------------------------------------------------------              13.3   8.09  >-----------<  306      [01-14-20 @ 02:38]              42.9     137  |  99  |  19.0  ----------------------------<  124      [01-14-20 @ 02:38]  3.8   |  23.0  |  1.41        Ca     8.8     [01-14-20 @ 02:38]      Mg     2.3     [01-14-20 @ 02:38]    TPro  7.7  /  Alb  4.2  /  TBili  0.4  /  DBili  x   /  AST  46  /  ALT  74  /  AlkPhos  109  [01-13-20 @ 11:18]    Creatinine Trend:  SCr 1.41 [01-14 @ 02:38]  SCr 1.25 [01-13 @ 11:18]  SCr 1.36 [01-13 @ 04:04]  SCr 0.99 [01-10 @ 13:33]  SCr 1.17 [01-10 @ 10:50]    Urinalysis - [01-11-20 @ 18:30]      Color Yellow / Appearance Clear / SG 1.020 / pH 6.0      Gluc 250 / Ketone Trace  / Bili Negative / Urobili Negative       Blood Trace / Protein Negative / Leuk Est Negative / Nitrite Negative      RBC 0-2 / WBC 0-2 / Hyaline  / Gran  / Sq Epi  / Non Sq Epi Occasional / Bacteria Occasional    Urine Creatinine 117      [01-14-20 @ 07:36]  Urine Protein 8.0      [01-14-20 @ 07:36]  Urine Sodium 112      [01-14-20 @ 07:36]  Protein/Creatinine Ratio, Urine  0.1 (01.14.20 @ 07:36)      HbA1c 8.6      [01-10-20 @ 12:33]  TSH 1.12      [01-10-20 @ 13:33]  Lipid: chol 431, , HDL 33,       [01-10-20 @ 10:50]    HCV 0.06, Nonreact      [01-10-20 @ 22:04]  HIV Nonreact      [01-10-20 @ 10:50]    EXAM:  US KIDNEY(S)                          PROCEDURE DATE:  01/14/2020      INTERPRETATION:  CLINICAL INFORMATION: Rising creatinine    COMPARISON: CT abdomen pelvis of 4/12/2015    TECHNIQUE: Sonography of the kidneys and bladder.     FINDINGS:    Right kidney:  13 cm. No renal mass, hydronephrosis or calculi. Increased echogenicity renal parenchyma parenchymal thinning compatible with medical renal disease.    Left kidney: Prior left nephrectomy. No residual or recurrent masses in the nephrectomy bed.    Incidental note of hepatic steatosis.    Will See After surgery,

## 2020-01-15 DIAGNOSIS — E04.1 NONTOXIC SINGLE THYROID NODULE: ICD-10-CM

## 2020-01-15 DIAGNOSIS — Z29.9 ENCOUNTER FOR PROPHYLACTIC MEASURES, UNSPECIFIED: ICD-10-CM

## 2020-01-15 LAB
ANION GAP SERPL CALC-SCNC: 11 MMOL/L — SIGNIFICANT CHANGE UP (ref 5–17)
APTT BLD: 28.2 SEC — SIGNIFICANT CHANGE UP (ref 27.5–36.3)
BUN SERPL-MCNC: 15 MG/DL — SIGNIFICANT CHANGE UP (ref 8–20)
CALCIUM SERPL-MCNC: 8.3 MG/DL — LOW (ref 8.6–10.2)
CHLORIDE SERPL-SCNC: 105 MMOL/L — SIGNIFICANT CHANGE UP (ref 98–107)
CO2 SERPL-SCNC: 22 MMOL/L — SIGNIFICANT CHANGE UP (ref 22–29)
CREAT SERPL-MCNC: 1.24 MG/DL — SIGNIFICANT CHANGE UP (ref 0.5–1.3)
GLUCOSE BLDC GLUCOMTR-MCNC: 105 MG/DL — HIGH (ref 70–99)
GLUCOSE BLDC GLUCOMTR-MCNC: 113 MG/DL — HIGH (ref 70–99)
GLUCOSE BLDC GLUCOMTR-MCNC: 126 MG/DL — HIGH (ref 70–99)
GLUCOSE BLDC GLUCOMTR-MCNC: 127 MG/DL — HIGH (ref 70–99)
GLUCOSE BLDC GLUCOMTR-MCNC: 130 MG/DL — HIGH (ref 70–99)
GLUCOSE BLDC GLUCOMTR-MCNC: 133 MG/DL — HIGH (ref 70–99)
GLUCOSE BLDC GLUCOMTR-MCNC: 136 MG/DL — HIGH (ref 70–99)
GLUCOSE BLDC GLUCOMTR-MCNC: 139 MG/DL — HIGH (ref 70–99)
GLUCOSE BLDC GLUCOMTR-MCNC: 158 MG/DL — HIGH (ref 70–99)
GLUCOSE BLDC GLUCOMTR-MCNC: 183 MG/DL — HIGH (ref 70–99)
GLUCOSE SERPL-MCNC: 122 MG/DL — HIGH (ref 70–115)
HCT VFR BLD CALC: 28.6 % — LOW (ref 39–50)
HGB BLD-MCNC: 9.5 G/DL — LOW (ref 13–17)
INR BLD: 1.24 RATIO — HIGH (ref 0.88–1.16)
MAGNESIUM SERPL-MCNC: 2.3 MG/DL — SIGNIFICANT CHANGE UP (ref 1.6–2.6)
MCHC RBC-ENTMCNC: 28.5 PG — SIGNIFICANT CHANGE UP (ref 27–34)
MCHC RBC-ENTMCNC: 33.2 GM/DL — SIGNIFICANT CHANGE UP (ref 32–36)
MCV RBC AUTO: 85.9 FL — SIGNIFICANT CHANGE UP (ref 80–100)
PLATELET # BLD AUTO: 181 K/UL — SIGNIFICANT CHANGE UP (ref 150–400)
POTASSIUM SERPL-MCNC: 4.2 MMOL/L — SIGNIFICANT CHANGE UP (ref 3.5–5.3)
POTASSIUM SERPL-SCNC: 4.2 MMOL/L — SIGNIFICANT CHANGE UP (ref 3.5–5.3)
PROTHROM AB SERPL-ACNC: 14.4 SEC — HIGH (ref 10–12.9)
RBC # BLD: 3.33 M/UL — LOW (ref 4.2–5.8)
RBC # FLD: 13.3 % — SIGNIFICANT CHANGE UP (ref 10.3–14.5)
SODIUM SERPL-SCNC: 138 MMOL/L — SIGNIFICANT CHANGE UP (ref 135–145)
WBC # BLD: 11.78 K/UL — HIGH (ref 3.8–10.5)
WBC # FLD AUTO: 11.78 K/UL — HIGH (ref 3.8–10.5)

## 2020-01-15 PROCEDURE — 93010 ELECTROCARDIOGRAM REPORT: CPT

## 2020-01-15 PROCEDURE — 71045 X-RAY EXAM CHEST 1 VIEW: CPT | Mod: 26

## 2020-01-15 PROCEDURE — 99223 1ST HOSP IP/OBS HIGH 75: CPT

## 2020-01-15 PROCEDURE — 99233 SBSQ HOSP IP/OBS HIGH 50: CPT

## 2020-01-15 RX ORDER — DEXTROSE 50 % IN WATER 50 %
25 SYRINGE (ML) INTRAVENOUS ONCE
Refills: 0 | Status: DISCONTINUED | OUTPATIENT
Start: 2020-01-15 | End: 2020-01-18

## 2020-01-15 RX ORDER — INSULIN LISPRO 100/ML
VIAL (ML) SUBCUTANEOUS
Refills: 0 | Status: DISCONTINUED | OUTPATIENT
Start: 2020-01-15 | End: 2020-01-18

## 2020-01-15 RX ORDER — INSULIN GLARGINE 100 [IU]/ML
28 INJECTION, SOLUTION SUBCUTANEOUS ONCE
Refills: 0 | Status: COMPLETED | OUTPATIENT
Start: 2020-01-15 | End: 2020-01-15

## 2020-01-15 RX ORDER — INSULIN LISPRO 100/ML
7 VIAL (ML) SUBCUTANEOUS
Refills: 0 | Status: DISCONTINUED | OUTPATIENT
Start: 2020-01-15 | End: 2020-01-18

## 2020-01-15 RX ORDER — DEXTROSE 50 % IN WATER 50 %
12.5 SYRINGE (ML) INTRAVENOUS ONCE
Refills: 0 | Status: DISCONTINUED | OUTPATIENT
Start: 2020-01-15 | End: 2020-01-18

## 2020-01-15 RX ORDER — ACETAMINOPHEN 500 MG
1000 TABLET ORAL ONCE
Refills: 0 | Status: COMPLETED | OUTPATIENT
Start: 2020-01-15 | End: 2020-01-15

## 2020-01-15 RX ORDER — HYDROMORPHONE HYDROCHLORIDE 2 MG/ML
0.25 INJECTION INTRAMUSCULAR; INTRAVENOUS; SUBCUTANEOUS ONCE
Refills: 0 | Status: DISCONTINUED | OUTPATIENT
Start: 2020-01-15 | End: 2020-01-15

## 2020-01-15 RX ORDER — INSULIN GLARGINE 100 [IU]/ML
28 INJECTION, SOLUTION SUBCUTANEOUS EVERY MORNING
Refills: 0 | Status: DISCONTINUED | OUTPATIENT
Start: 2020-01-16 | End: 2020-01-16

## 2020-01-15 RX ORDER — SENNA PLUS 8.6 MG/1
2 TABLET ORAL AT BEDTIME
Refills: 0 | Status: DISCONTINUED | OUTPATIENT
Start: 2020-01-15 | End: 2020-01-18

## 2020-01-15 RX ORDER — LIDOCAINE 4 G/100G
2 CREAM TOPICAL DAILY
Refills: 0 | Status: DISCONTINUED | OUTPATIENT
Start: 2020-01-15 | End: 2020-01-18

## 2020-01-15 RX ORDER — METOPROLOL TARTRATE 50 MG
25 TABLET ORAL
Refills: 0 | Status: DISCONTINUED | OUTPATIENT
Start: 2020-01-15 | End: 2020-01-18

## 2020-01-15 RX ORDER — INSULIN LISPRO 100/ML
4 VIAL (ML) SUBCUTANEOUS
Refills: 0 | Status: DISCONTINUED | OUTPATIENT
Start: 2020-01-15 | End: 2020-01-15

## 2020-01-15 RX ORDER — OXYCODONE AND ACETAMINOPHEN 5; 325 MG/1; MG/1
1 TABLET ORAL EVERY 4 HOURS
Refills: 0 | Status: DISCONTINUED | OUTPATIENT
Start: 2020-01-15 | End: 2020-01-18

## 2020-01-15 RX ORDER — ENOXAPARIN SODIUM 100 MG/ML
40 INJECTION SUBCUTANEOUS DAILY
Refills: 0 | Status: DISCONTINUED | OUTPATIENT
Start: 2020-01-15 | End: 2020-01-18

## 2020-01-15 RX ORDER — OXYCODONE AND ACETAMINOPHEN 5; 325 MG/1; MG/1
2 TABLET ORAL EVERY 4 HOURS
Refills: 0 | Status: DISCONTINUED | OUTPATIENT
Start: 2020-01-15 | End: 2020-01-18

## 2020-01-15 RX ADMIN — HYDROMORPHONE HYDROCHLORIDE 0.25 MILLIGRAM(S): 2 INJECTION INTRAMUSCULAR; INTRAVENOUS; SUBCUTANEOUS at 07:02

## 2020-01-15 RX ADMIN — OXYCODONE AND ACETAMINOPHEN 2 TABLET(S): 5; 325 TABLET ORAL at 19:41

## 2020-01-15 RX ADMIN — Medication 4 UNIT(S): at 08:25

## 2020-01-15 RX ADMIN — Medication 7 UNIT(S): at 17:19

## 2020-01-15 RX ADMIN — POLYETHYLENE GLYCOL 3350 17 GRAM(S): 17 POWDER, FOR SOLUTION ORAL at 11:18

## 2020-01-15 RX ADMIN — PANTOPRAZOLE SODIUM 40 MILLIGRAM(S): 20 TABLET, DELAYED RELEASE ORAL at 11:18

## 2020-01-15 RX ADMIN — Medication 400 MILLIGRAM(S): at 04:13

## 2020-01-15 RX ADMIN — Medication 250 MILLIGRAM(S): at 08:26

## 2020-01-15 RX ADMIN — Medication 25 MILLIGRAM(S): at 07:04

## 2020-01-15 RX ADMIN — LIDOCAINE 2 PATCH: 4 CREAM TOPICAL at 11:18

## 2020-01-15 RX ADMIN — SENNA PLUS 2 TABLET(S): 8.6 TABLET ORAL at 22:33

## 2020-01-15 RX ADMIN — Medication 1000 MILLIGRAM(S): at 06:52

## 2020-01-15 RX ADMIN — ENOXAPARIN SODIUM 40 MILLIGRAM(S): 100 INJECTION SUBCUTANEOUS at 11:18

## 2020-01-15 RX ADMIN — Medication 7 UNIT(S): at 12:00

## 2020-01-15 RX ADMIN — HYDROMORPHONE HYDROCHLORIDE 0.25 MILLIGRAM(S): 2 INJECTION INTRAMUSCULAR; INTRAVENOUS; SUBCUTANEOUS at 07:17

## 2020-01-15 RX ADMIN — Medication 400 MILLIGRAM(S): at 10:24

## 2020-01-15 RX ADMIN — Medication 100 MILLIGRAM(S): at 17:17

## 2020-01-15 RX ADMIN — Medication 100 MILLIGRAM(S): at 08:26

## 2020-01-15 RX ADMIN — MUPIROCIN 1 APPLICATION(S): 20 OINTMENT TOPICAL at 08:25

## 2020-01-15 RX ADMIN — LIDOCAINE 2 PATCH: 4 CREAM TOPICAL at 19:28

## 2020-01-15 RX ADMIN — OXYCODONE AND ACETAMINOPHEN 2 TABLET(S): 5; 325 TABLET ORAL at 18:41

## 2020-01-15 RX ADMIN — Medication 250 MILLIGRAM(S): at 22:33

## 2020-01-15 RX ADMIN — Medication 325 MILLIGRAM(S): at 11:18

## 2020-01-15 RX ADMIN — Medication 100 MILLIGRAM(S): at 23:21

## 2020-01-15 RX ADMIN — INSULIN GLARGINE 28 UNIT(S): 100 INJECTION, SOLUTION SUBCUTANEOUS at 11:17

## 2020-01-15 RX ADMIN — LIDOCAINE 2 PATCH: 4 CREAM TOPICAL at 22:26

## 2020-01-15 RX ADMIN — Medication 25 MILLIGRAM(S): at 17:19

## 2020-01-15 RX ADMIN — Medication 1000 MILLIGRAM(S): at 10:39

## 2020-01-15 NOTE — PROGRESS NOTE ADULT - SUBJECTIVE AND OBJECTIVE BOX
[facility], [facility]                                                    [address], [address], [place] [address]                                                         PHONE: [phone]    FAX: [phone] [address] [facility], [address], [place] [address]                                                 PHONE: [phone]    FAX: [phone]  *******************************************************************************  cc: s/p CABG 1/14    HPI: 55 y/o male who presented with recent onset of intermittent chest pain with exertion, and chest heaviness resolving with rest without radiation. No SOB. Pt went to his PCP whom he had not seen x10 years who then referred him to cardiologist.    States daily compliance with aspirin 81 +FH CAD premature, HTN, HLD, non compliance, angina, former smoker/s/p nephrectomy for donor status. Cardiac catheterization with severe CAD now s/p CABG 1/14      Overnight events/Subjective Assessment:    INTERPRETATION OF TELEMETRY (personally reviewed):    PAST MEDICAL & SURGICAL HISTORY:  Statin intolerance: elevated liver enzymes  Carotid disease, bilateral: mild  HLD (hyperlipidemia)  HTN (hypertension)  Non-compliance: with medical care  AP (angina pectoris)  Obesity  H/O elbow surgery: right  S/p nephrectomy: left side for donor status      Lipitor (Hepatotoxicity)  No Known Allergies  ohs (Unknown)      MEDICATIONS  (STANDING):  aspirin enteric coated 325 milliGRAM(s) Oral daily  cefuroxime  IVPB 1500 milliGRAM(s) IV Intermittent every 8 hours  chlorhexidine 2% Cloths 1 Application(s) Topical daily  dextrose 50% Injectable 50 milliLiter(s) IV Push every 15 minutes  dextrose 50% Injectable 25 milliLiter(s) IV Push every 15 minutes  enoxaparin Injectable 40 milliGRAM(s) SubCutaneous daily  insulin lispro Injectable (HumaLOG) 4 Unit(s) SubCutaneous three times a day before meals  insulin regular Infusion 2 Unit(s)/Hr (2 mL/Hr) IV Continuous <Continuous>  metoprolol tartrate 25 milliGRAM(s) Oral two times a day  mupirocin 2% Nasal 1 Application(s) Both Nostrils every 12 hours  ondansetron Injectable 4 milliGRAM(s) IV Push once  pantoprazole    Tablet 40 milliGRAM(s) Oral daily  polyethylene glycol 3350 17 Gram(s) Oral daily  senna 2 Tablet(s) Oral at bedtime  sodium chloride 0.9%. 1000 milliLiter(s) (10 mL/Hr) IV Continuous <Continuous>  sodium chloride 0.9%. 1000 milliLiter(s) (5 mL/Hr) IV Continuous <Continuous>  vancomycin  IVPB 1000 milliGRAM(s) IV Intermittent every 12 hours    MEDICATIONS  (PRN):      Vital Signs Last 24 Hrs  T(C): 37.1 (15 Jama 2020 07:00), Max: 37.3 (15 Jama 2020 00:00)  T(F): 98.8 (15 Jama 2020 07:00), Max: 99.1 (15 Jama 2020 00:00)  HR: 77 (15 Jama 2020 07:00) (68 - 86)  BP: 115/62 (14 Jan 2020 20:00) (115/62 - 115/89)  BP(mean): 81 (14 Jan 2020 20:00) (81 - 99)  RR: 16 (15 Jama 2020 07:00) (10 - 34)  SpO2: 100% (15 Jama 2020 07:00) (99% - 100%)    I&O's Detail    14 Jan 2020 07:01  -  15 Jama 2020 07:00  --------------------------------------------------------  IN:    insulin regular Infusion: 54 mL    Lactated Ringers IV Bolus: 1000 mL    multiple electrolytes Injection Type 1multiple electrolytes Injection Type 1: 100 mL    niCARdipine Infusion: 22.5 mL    Oral Fluid: 240 mL    sodium chloride 0.9%.: 90 mL    sodium chloride 0.9%.: 180 mL    Solution: 100 mL    Solution: 240 mL    Solution: 250 mL    Solution: 150 mL    Solution: 100 mL  Total IN: 2526.5 mL    OUT:    Chest Tube: 230 mL    Chest Tube: 70 mL    Chest Tube: 120 mL    Indwelling Catheter - Urethral: 3765 mL  Total OUT: 4185 mL    Total NET: -1658.5 mL        I&O's Summary    14 Jan 2020 07:01  -  15 Jama 2020 07:00  --------------------------------------------------------  IN: 2526.5 mL / OUT: 4185 mL / NET: -1658.5 mL        Mode: AC/ CMV (Assist Control/ Continuous Mandatory Ventilation), RR (machine): 10, TV (machine): 800, FiO2: 60, PEEP: 5, PS: 10, MAP: 8, PIP: 26    PHYSICAL EXAM:  General: Appears well developed, well nourished, no acute distress. not in acute pain  HEAD: normal cephalic. Atraumatic  PUPILS: equal and reactive to light  EARS: normal hearing  NECK: supple. no JVD or HJR. no carotid bruits. no visible lymphadenopathy  NOSE: no gross abnormalities  CHEST: symmetric chest wall expansion  CARDIOVASCULAR: Normal rate. Regular rhythm. Normal S1 and S2, no S3/S4,  no murmur, rub, or gallop  LUNGS: Normal effort. Normal respiratory rate. Breath sounds are clear to auscultation bilaterally. No respiratory distress. No stridor.  no rales, rhonchi or wheeze. no decreased Breath sounds  ABDOMEN: Soft, nontender, non-distended, positive bowel sounds, no mass or bruit. no abdominal tenderness. No rebound. no ascites  EXTREMITIES: No clubbing, cyanosis or edema. normal range of motion  PULSES:  distal pulses WNL  SKIN: Warm and dry with normal turgor. no visible rash or cyanosis   NEURO: Alert & oriented x 3, grossly intact with no focal weakness  PSYCH: normal mood and affect. Grossly normal insight and judgement exhibited    FAMILY HISTORY: family hx of premature CAD      SOCIAL HISTORY:  former smoking. No ETOH/No IVDA    REVIEW OF SYSTEMS:  Constitutional: no fever, chills or malaise. No weight loss  Head: no trauma  Eyes: no visual deficit. No double vision  Ears: no hearing deficit or ringing in the ears  Nose: no nose bleeds or smell changes or congestion  Throat: no difficult swallowing or painful swallowing  Neck: supple. No lymphadenopathy or swelling  Respiratory: no SOB, wheeze, asthma, COPD. No cough. No blood in the sputum  Cardiovascular: no CP, palpitations, irregular heart beats. No edema. No PND. No orthopnea. No skin/temperature or color changes  Gastrointestinal: no abdominal pain. No constipation. No diarrhea. No melena. No nausea. No vomiting. No bloating  Genitourinary: no frequency or urgency. No hematuria  Lymphatics: no grossly swollen lymph nodes  Musculoskeletal: no limitation of range of motion. Normal strength. No pain  Integumentary: no visible rash. No itching  Neurologic: no HA. No TIA or stroke symptoms. No seizure. No hx of epilepsy. No tingling or numbness. No weakness. No dizziness  Psychiatric: denied. Reports appropriate mood.        LABS:                        9.5    11.78 )-----------( 181      ( 15 Jama 2020 03:03 )             28.6     01-15    138  |  105  |  15.0  ----------------------------<  122<H>  4.2   |  22.0  |  1.24    Ca    8.3<L>      15 Jama 2020 03:03  Mg     2.3     01-15    TPro  5.9<L>  /  Alb  4.2  /  TBili  0.8  /  DBili  x   /  AST  49<H>  /  ALT  41<H>  /  AlkPhos  53  01-14        PT/INR - ( 15 Jama 2020 03:03 )   PT: 14.4 sec;   INR: 1.24 ratio         PTT - ( 15 Jama 2020 03:03 )  PTT:28.2 sec  Serum Pro-Brain Natriuretic Peptide: 130 pg/mL (01-10 @ 13:33)  serum  Lipids:   Hemoglobin A1C, Whole Blood: 8.6 % (01-10 @ 12:33)    Thyroid Stimulating Hormone, Serum: 1.12 uIU/mL (01-10 @ 13:33)      RADIOLOGY & ADDITIONAL STUDIES:    < from: Cardiac Cath Lab - Adult (01.10.20 @ 11:59) >  VENTRICLES: There were no left ventricular global or regional wall motion  abnormalities. EF calculated by contrast ventriculography was 60 %.  CORONARY VESSELS: The coronary circulation is right dominant.  LM:   --  LM: Angiography showed minor luminal irregularities with no flow  limiting lesions.  LAD:   --  Mid LAD: The distal vessel was supplied by extensive collaterals  from the third obtuse marginal. There was a 100 % stenosis.  --  D1: There was a discrete 90 % stenosis at the ostium of the vessel  segment. The lesion was hazy, complex, and eccentric. There was ANDRY grade  3 flow through the vessel (brisk flow).  CX:   --  OM2: The vessel was large sized. There was a tubular 100 %  stenosis in the proximal third of the vessel segment. There was ANDRY grade  0 flow through the vessel (no flow).  RCA:   --  Proximal RCA: There was a tubular 99 % stenosis. The lesion was  hazy, complex,and eccentric. There was ANDRY grade 1 flow through the  vessel (slow flow without perfusion).  --  RPDA: The distal vessel was supplied by extensive collaterals from the  third obtuse marginal.  ARCH VESSELS: LIMA: Normal.  COMPLICATIONS: No complications occurred during the cath lab visit.  DIAGNOSTIC IMPRESSIONS: Unstable angina with minimal exertion (CCS cl III)  Severe native three vessel CAD with preserved LV function  The LIMA is widely patent    < end of copied text >    < from: TTE Echo Complete w/Doppler (01.10.20 @ 17:26) >  Summary:   1. Technically good study.   2. Hyperdynamic global left ventricular systolic function.   3. Left ventricular ejection fraction, by visual estimation, is >75%.   4. Spectral Doppler shows impaired relaxation pattern of left ventricular myocardial filling (Grade I diastolic dysfunction).   5. Mild thickening of the anterior mitral valve leaflet.   6. Trace mitral valve regurgitation.   7. Trivial pericardial effusion.    < end of copied text >        < from: Xray Chest 1 View AP/PA. (01.14.20 @ 15:53) >    Impression:    Status post sternotomy and CABG midline is in place.    < end of copied text >    ASSESSMENT AND PLAN:  In summary, MARGOT JESUS is a 54y Male with past medical history significant for  presentation with intermittent chest pain with exertion, and chest heaviness resolving with rest without radiation. No SOB. Pt went to his PCP whom he had not seen x10 years who then referred him to cardiologist.    States daily compliance with aspirin 81 +FH CAD premature, HTN, HLD, non compliance, angina, former smoker/s/p nephrectomy for donor status. Cardiac catheterization with severe CAD, now s/p CABG 1/14    - Severe multivessel CAD. s/p CABG 1/14 (LIMA to LAD, SVG-PDA, SVG-OM, SVG-Diagonal). post op day 1. on ASA 325mg daily    - HTN. metoprolol 25mg BID    - HL. eventual statin    - Telemetry monitoring personally reviewed by me. SR    - radiologic imaging reviewed    - Laboratory data reviewed.    - I spoke with family member at the bedside and nursing at the bedside    - I have personally reviewed all obtainable prior records and data        Kiarra Salazar MD [facility], [facility]                                                    [address], [place] [address]                                                         PHONE: [phone]    FAX: [phone] [address] [facility], [address], [place] [address]                                                 PHONE: [phone]    FAX: [phone]  *******************************************************************************  cc: s/p CABG 1/14/20    HPI: 55 y/o male who presented with recent onset of intermittent chest pain with exertion, and chest heaviness resolving with rest without radiation. No SOB. Pt went to his PCP whom he had not seen x10 years who then referred him to cardiologist.    States daily compliance with aspirin 81 +FH CAD premature, HTN, HLD, non compliance, angina, former smoker/s/p nephrectomy for donor status. Cardiac catheterization with severe CAD now s/p CABG 1/14      Overnight events/Subjective Assessment: refusing some of the pain meds. Not using incentive spirometry as well as he could have due to pain    INTERPRETATION OF TELEMETRY (personally reviewed): SR    PAST MEDICAL & SURGICAL HISTORY:  Statin intolerance: elevated liver enzymes  Carotid disease, bilateral: mild  HLD (hyperlipidemia)  HTN (hypertension)  Non-compliance: with medical care  AP (angina pectoris)  Obesity  H/O elbow surgery: right  S/p nephrectomy: left side for donor status    Allergies:  Lipitor (Hepatotoxicity)  No Known Allergies  ohs (Unknown)      MEDICATIONS  (STANDING):  aspirin enteric coated 325 milliGRAM(s) Oral daily  cefuroxime  IVPB 1500 milliGRAM(s) IV Intermittent every 8 hours  chlorhexidine 2% Cloths 1 Application(s) Topical daily  dextrose 50% Injectable 50 milliLiter(s) IV Push every 15 minutes  dextrose 50% Injectable 25 milliLiter(s) IV Push every 15 minutes  enoxaparin Injectable 40 milliGRAM(s) SubCutaneous daily  insulin lispro Injectable (HumaLOG) 4 Unit(s) SubCutaneous three times a day before meals  insulin regular Infusion 2 Unit(s)/Hr (2 mL/Hr) IV Continuous <Continuous>  metoprolol tartrate 25 milliGRAM(s) Oral two times a day  mupirocin 2% Nasal 1 Application(s) Both Nostrils every 12 hours  ondansetron Injectable 4 milliGRAM(s) IV Push once  pantoprazole    Tablet 40 milliGRAM(s) Oral daily  polyethylene glycol 3350 17 Gram(s) Oral daily  senna 2 Tablet(s) Oral at bedtime  sodium chloride 0.9%. 1000 milliLiter(s) (10 mL/Hr) IV Continuous <Continuous>  sodium chloride 0.9%. 1000 milliLiter(s) (5 mL/Hr) IV Continuous <Continuous>  vancomycin  IVPB 1000 milliGRAM(s) IV Intermittent every 12 hours    MEDICATIONS  (PRN):      Vital Signs Last 24 Hrs  T(C): 37.1 (15 Jama 2020 07:00), Max: 37.3 (15 Jama 2020 00:00)  T(F): 98.8 (15 Jama 2020 07:00), Max: 99.1 (15 Jama 2020 00:00)  HR: 77 (15 Jama 2020 07:00) (68 - 86)  BP: 115/62 (14 Jan 2020 20:00) (115/62 - 115/89)  BP(mean): 81 (14 Jan 2020 20:00) (81 - 99)  RR: 16 (15 Jama 2020 07:00) (10 - 34)  SpO2: 100% (15 Jama 2020 07:00) (99% - 100%)    I&O's Detail    14 Jan 2020 07:01  -  15 Jama 2020 07:00  --------------------------------------------------------  IN:    insulin regular Infusion: 54 mL    Lactated Ringers IV Bolus: 1000 mL    multiple electrolytes Injection Type 1multiple electrolytes Injection Type 1: 100 mL    niCARdipine Infusion: 22.5 mL    Oral Fluid: 240 mL    sodium chloride 0.9%.: 90 mL    sodium chloride 0.9%.: 180 mL    Solution: 100 mL    Solution: 240 mL    Solution: 250 mL    Solution: 150 mL    Solution: 100 mL  Total IN: 2526.5 mL    OUT:    Chest Tube: 230 mL    Chest Tube: 70 mL    Chest Tube: 120 mL    Indwelling Catheter - Urethral: 3765 mL  Total OUT: 4185 mL    Total NET: -1658.5 mL        I&O's Summary    14 Jan 2020 07:01  -  15 Jama 2020 07:00  --------------------------------------------------------  IN: 2526.5 mL / OUT: 4185 mL / NET: -1658.5 mL        Mode: AC/ CMV (Assist Control/ Continuous Mandatory Ventilation), RR (machine): 10, TV (machine): 800, FiO2: 60, PEEP: 5, PS: 10, MAP: 8, PIP: 26    PHYSICAL EXAM:  General: Appears well developed, well nourished, no acute distress. not in acute pain. sitting in chair. Extubated  HEAD: normal cephalic. Atraumatic  PUPILS: equal and reactive to light  EARS: normal hearing  NECK: supple. no JVD or HJR. no carotid bruits. no visible lymphadenopathy  NOSE: no gross abnormalities  CHEST: symmetric chest wall expansion. chest tubes x 2  CARDIOVASCULAR: Normal rate. Regular rhythm. Normal S1 and S2, no S3/S4,  no murmur, rub, or gallop  LUNGS: Normal effort. Normal respiratory rate. Breath sounds are clear to auscultation bilaterally. No respiratory distress. No stridor.  no rales, rhonchi or wheeze. no decreased Breath sounds  ABDOMEN: Soft, nontender, non-distended, positive bowel sounds, no mass or bruit. no abdominal tenderness. No rebound. no ascites  EXTREMITIES: No clubbing, cyanosis or edema. normal range of motion  PULSES:  distal pulses WNL  SKIN: Warm and dry with normal turgor. no visible rash or cyanosis   NEURO: Alert & oriented x 3, grossly intact with no focal weakness  PSYCH: normal mood and affect. Grossly normal insight and judgement exhibited    FAMILY HISTORY: family hx of premature CAD      SOCIAL HISTORY:  former smoking. No ETOH/No IVDA    REVIEW OF SYSTEMS:  Constitutional: no fever, chills or malaise. No weight loss  Head: no trauma  Eyes: no visual deficit. No double vision  Ears: no hearing deficit or ringing in the ears  Nose: no nose bleeds or smell changes or congestion  Throat: no difficult swallowing or painful swallowing  Neck: supple. No lymphadenopathy or swelling  Respiratory: no SOB, wheeze, asthma, COPD. No cough. No blood in the sputum  Cardiovascular: no CP, palpitations, irregular heart beats. No edema. No PND. No orthopnea. No skin/temperature or color changes  Gastrointestinal: no abdominal pain. No constipation. No diarrhea. No melena. No nausea. No vomiting. No bloating  Genitourinary: no frequency or urgency. No hematuria  Lymphatics: no grossly swollen lymph nodes  Musculoskeletal: no limitation of range of motion. Normal strength. No pain  Integumentary: no visible rash. No itching  Neurologic: no HA. No TIA or stroke symptoms. No seizure. No hx of epilepsy. No tingling or numbness. No weakness. No dizziness  Psychiatric: denied. Reports appropriate mood.        LABS:                        9.5    11.78 )-----------( 181      ( 15 Jama 2020 03:03 )             28.6     01-15    138  |  105  |  15.0  ----------------------------<  122<H>  4.2   |  22.0  |  1.24    Ca    8.3<L>      15 Jama 2020 03:03  Mg     2.3     01-15    TPro  5.9<L>  /  Alb  4.2  /  TBili  0.8  /  DBili  x   /  AST  49<H>  /  ALT  41<H>  /  AlkPhos  53  01-14        PT/INR - ( 15 Jama 2020 03:03 )   PT: 14.4 sec;   INR: 1.24 ratio         PTT - ( 15 Jama 2020 03:03 )  PTT:28.2 sec  Serum Pro-Brain Natriuretic Peptide: 130 pg/mL (01-10 @ 13:33)  serum  Lipids:   Hemoglobin A1C, Whole Blood: 8.6 % (01-10 @ 12:33)    Thyroid Stimulating Hormone, Serum: 1.12 uIU/mL (01-10 @ 13:33)      RADIOLOGY & ADDITIONAL STUDIES:    < from: Cardiac Cath Lab - Adult (01.10.20 @ 11:59) >  VENTRICLES: There were no left ventricular global or regional wall motion  abnormalities. EF calculated by contrast ventriculography was 60 %.  CORONARY VESSELS: The coronary circulation is right dominant.  LM:   --  LM: Angiography showed minor luminal irregularities with no flow  limiting lesions.  LAD:   --  Mid LAD: The distal vessel was supplied by extensive collaterals  from the third obtuse marginal. There was a 100 % stenosis.  --  D1: There was a discrete 90 % stenosis at the ostium of the vessel  segment. The lesion was hazy, complex, and eccentric. There was ANDRY grade  3 flow through the vessel (brisk flow).  CX:   --  OM2: The vessel was large sized. There was a tubular 100 %  stenosis in the proximal third of the vessel segment. There was ANDRY grade  0 flow through the vessel (no flow).  RCA:   --  Proximal RCA: There was a tubular 99 % stenosis. The lesion was  hazy, complex,and eccentric. There was ANDRY grade 1 flow through the  vessel (slow flow without perfusion).  --  RPDA: The distal vessel was supplied by extensive collaterals from the  third obtuse marginal.  ARCH VESSELS: LIMA: Normal.  COMPLICATIONS: No complications occurred during the cath lab visit.  DIAGNOSTIC IMPRESSIONS: Unstable angina with minimal exertion (CCS cl III)  Severe native three vessel CAD with preserved LV function  The LIMA is widely patent    < end of copied text >    < from: TTE Echo Complete w/Doppler (01.10.20 @ 17:26) >  Summary:   1. Technically good study.   2. Hyperdynamic global left ventricular systolic function.   3. Left ventricular ejection fraction, by visual estimation, is >75%.   4. Spectral Doppler shows impaired relaxation pattern of left ventricular myocardial filling (Grade I diastolic dysfunction).   5. Mild thickening of the anterior mitral valve leaflet.   6. Trace mitral valve regurgitation.   7. Trivial pericardial effusion.    < end of copied text >        < from: Xray Chest 1 View AP/PA. (01.14.20 @ 15:53) >    Impression:    Status post sternotomy and CABG midline is in place.    < end of copied text >    ASSESSMENT AND PLAN:  In summary, MARGOT JESUS is a 54y Male with past medical history significant for presentation with intermittent chest pain with exertion, and chest heaviness resolving with rest without radiation. No SOB. Pt went to his PCP whom he had not seen x10 years who then referred him to cardiologist.    States daily compliance with aspirin 81 +FH CAD premature, HTN, HLD, non compliance, angina, former smoker/s/p nephrectomy for donor status. Cardiac catheterization with severe CAD, now s/p CABG 1/14/20    - Severe multivessel CAD with preserved LV function.  s/p CABG 1/14/20 (LIMA to LAD, SVG-PDA, SVG-OM, SVG-Diagonal). post op day 1. on ASA 325mg daily    - HTN. metoprolol 25mg BID. BP is controlled    - HL. eventual statin?. Hepatotoxicity with lipitor in the past. Would at least resume zetia.    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 Continue to monitor renal function. I's>O's.  No CHF on exam. Renal appreciated    - Respiratory status. Pt taking shallow breaths due to expected discomfort following open heart surgery. Not progressing adequately with incentive spirometry as per nursing.  The importance of taking pain medications to allow for appropriate incentive spirometry use and to allow for chest tube removal dw the pt.  The pt expresses understanding and will attempt to improve his use of the incentive spirometer.    - [place] radha removed. Pt on O2 via NC.    - Telemetry monitoring personally reviewed by me. SR    - radiologic imaging reviewed    - Laboratory data reviewed.    - I spoke with nursing at the bedside. Incentive spirometry use to be encouraged. Hemodynamics have remained stable overnight.    - Pt currently in chair. Mobilize as tolerated    - I have personally reviewed all obtainable prior records and data        Kiarra Salazar MD [facility], [facility]                                                    [address] , [address], [place] [address]                                                         PHONE: [phone]    FAX: [phone] [address] [facility], [address], [place] [address]                                                 PHONE: [phone]    FAX: [phone]  *******************************************************************************  cc: s/p CABG 1/14/20    HPI: 53 y/o male who presented with recent onset of intermittent chest pain with exertion, and chest heaviness resolving with rest without radiation. No SOB. Pt went to his PCP whom he had not seen x10 years who then referred him to cardiologist.    States daily compliance with aspirin 81 +FH CAD premature, HTN, HLD, non compliance, angina, former smoker/s/p nephrectomy for donor status. Cardiac catheterization with severe CAD now s/p CABG 1/14      Overnight events/Subjective Assessment: refusing some of the pain meds. Not using incentive spirometry as well as he could have due to pain    INTERPRETATION OF TELEMETRY (personally reviewed): SR    PAST MEDICAL & SURGICAL HISTORY:  Statin intolerance: elevated liver enzymes  Carotid disease, bilateral: mild  HLD (hyperlipidemia)  HTN (hypertension)  Non-compliance: with medical care  AP (angina pectoris)  Obesity  H/O elbow surgery: right  S/p nephrectomy: left side for donor status    Allergies:  Lipitor (Hepatotoxicity)  No Known Allergies  ohs (Unknown)      MEDICATIONS  (STANDING):  aspirin enteric coated 325 milliGRAM(s) Oral daily  cefuroxime  IVPB 1500 milliGRAM(s) IV Intermittent every 8 hours  chlorhexidine 2% Cloths 1 Application(s) Topical daily  dextrose 50% Injectable 50 milliLiter(s) IV Push every 15 minutes  dextrose 50% Injectable 25 milliLiter(s) IV Push every 15 minutes  enoxaparin Injectable 40 milliGRAM(s) SubCutaneous daily  insulin lispro Injectable (HumaLOG) 4 Unit(s) SubCutaneous three times a day before meals  insulin regular Infusion 2 Unit(s)/Hr (2 mL/Hr) IV Continuous <Continuous>  metoprolol tartrate 25 milliGRAM(s) Oral two times a day  mupirocin 2% Nasal 1 Application(s) Both Nostrils every 12 hours  ondansetron Injectable 4 milliGRAM(s) IV Push once  pantoprazole    Tablet 40 milliGRAM(s) Oral daily  polyethylene glycol 3350 17 Gram(s) Oral daily  senna 2 Tablet(s) Oral at bedtime  sodium chloride 0.9%. 1000 milliLiter(s) (10 mL/Hr) IV Continuous <Continuous>  sodium chloride 0.9%. 1000 milliLiter(s) (5 mL/Hr) IV Continuous <Continuous>  vancomycin  IVPB 1000 milliGRAM(s) IV Intermittent every 12 hours    MEDICATIONS  (PRN):      Vital Signs Last 24 Hrs  T(C): 37.1 (15 Jama 2020 07:00), Max: 37.3 (15 Jama 2020 00:00)  T(F): 98.8 (15 Jama 2020 07:00), Max: 99.1 (15 Jama 2020 00:00)  HR: 77 (15 Jama 2020 07:00) (68 - 86)  BP: 115/62 (14 Jan 2020 20:00) (115/62 - 115/89)  BP(mean): 81 (14 Jan 2020 20:00) (81 - 99)  RR: 16 (15 Jama 2020 07:00) (10 - 34)  SpO2: 100% (15 Jama 2020 07:00) (99% - 100%)    I&O's Detail    14 Jan 2020 07:01  -  15 Jama 2020 07:00  --------------------------------------------------------  IN:    insulin regular Infusion: 54 mL    Lactated Ringers IV Bolus: 1000 mL    multiple electrolytes Injection Type 1multiple electrolytes Injection Type 1: 100 mL    niCARdipine Infusion: 22.5 mL    Oral Fluid: 240 mL    sodium chloride 0.9%.: 90 mL    sodium chloride 0.9%.: 180 mL    Solution: 100 mL    Solution: 240 mL    Solution: 250 mL    Solution: 150 mL    Solution: 100 mL  Total IN: 2526.5 mL    OUT:    Chest Tube: 230 mL    Chest Tube: 70 mL    Chest Tube: 120 mL    Indwelling Catheter - Urethral: 3765 mL  Total OUT: 4185 mL    Total NET: -1658.5 mL        I&O's Summary    14 Jan 2020 07:01  -  15 Jama 2020 07:00  --------------------------------------------------------  IN: 2526.5 mL / OUT: 4185 mL / NET: -1658.5 mL        Mode: AC/ CMV (Assist Control/ Continuous Mandatory Ventilation), RR (machine): 10, TV (machine): 800, FiO2: 60, PEEP: 5, PS: 10, MAP: 8, PIP: 26    PHYSICAL EXAM:  General: Appears well developed, well nourished, no acute distress. not in acute pain. sitting in chair. Extubated  HEAD: normal cephalic. Atraumatic  PUPILS: equal and reactive to light  EARS: normal hearing  NECK: supple. no JVD or HJR. no carotid bruits. no visible lymphadenopathy  NOSE: no gross abnormalities  CHEST: symmetric chest wall expansion. chest tubes x 2  CARDIOVASCULAR: Normal rate. Regular rhythm. Normal S1 and S2, no S3/S4,  no murmur, rub, or gallop  LUNGS: Normal effort. Normal respiratory rate. Breath sounds are clear to auscultation bilaterally. No respiratory distress. No stridor.  no rales, rhonchi or wheeze. no decreased Breath sounds  ABDOMEN: Soft, nontender, non-distended, positive bowel sounds, no mass or bruit. no abdominal tenderness. No rebound. no ascites  EXTREMITIES: No clubbing, cyanosis or edema. normal range of motion  PULSES:  distal pulses WNL  SKIN: Warm and dry with normal turgor. no visible rash or cyanosis   NEURO: Alert & oriented x 3, grossly intact with no focal weakness  PSYCH: normal mood and affect. Grossly normal insight and judgement exhibited    FAMILY HISTORY: family hx of premature CAD      SOCIAL HISTORY:  former smoking. No ETOH/No IVDA    REVIEW OF SYSTEMS:  Constitutional: no fever, chills or malaise. No weight loss  Head: no trauma  Eyes: no visual deficit. No double vision  Ears: no hearing deficit or ringing in the ears  Nose: no nose bleeds or smell changes or congestion  Throat: no difficult swallowing or painful swallowing  Neck: supple. No lymphadenopathy or swelling  Respiratory: no SOB, wheeze, asthma, COPD. No cough. No blood in the sputum  Cardiovascular: no CP, palpitations, irregular heart beats. No edema. No PND. No orthopnea. No skin/temperature or color changes  Gastrointestinal: no abdominal pain. No constipation. No diarrhea. No melena. No nausea. No vomiting. No bloating  Genitourinary: no frequency or urgency. No hematuria  Lymphatics: no grossly swollen lymph nodes  Musculoskeletal: no limitation of range of motion. Normal strength. No pain  Integumentary: no visible rash. No itching  Neurologic: no HA. No TIA or stroke symptoms. No seizure. No hx of epilepsy. No tingling or numbness. No weakness. No dizziness  Psychiatric: denied. Reports appropriate mood.        LABS:                        9.5    11.78 )-----------( 181      ( 15 Jama 2020 03:03 )             28.6     01-15    138  |  105  |  15.0  ----------------------------<  122<H>  4.2   |  22.0  |  1.24    Ca    8.3<L>      15 Jama 2020 03:03  Mg     2.3     01-15    TPro  5.9<L>  /  Alb  4.2  /  TBili  0.8  /  DBili  x   /  AST  49<H>  /  ALT  41<H>  /  AlkPhos  53  01-14        PT/INR - ( 15 Jama 2020 03:03 )   PT: 14.4 sec;   INR: 1.24 ratio         PTT - ( 15 Jama 2020 03:03 )  PTT:28.2 sec  Serum Pro-Brain Natriuretic Peptide: 130 pg/mL (01-10 @ 13:33)  serum  Lipids:   Hemoglobin A1C, Whole Blood: 8.6 % (01-10 @ 12:33)    Thyroid Stimulating Hormone, Serum: 1.12 uIU/mL (01-10 @ 13:33)      RADIOLOGY & ADDITIONAL STUDIES:    < from: Cardiac Cath Lab - Adult (01.10.20 @ 11:59) >  VENTRICLES: There were no left ventricular global or regional wall motion  abnormalities. EF calculated by contrast ventriculography was 60 %.  CORONARY VESSELS: The coronary circulation is right dominant.  LM:   --  LM: Angiography showed minor luminal irregularities with no flow  limiting lesions.  LAD:   --  Mid LAD: The distal vessel was supplied by extensive collaterals  from the third obtuse marginal. There was a 100 % stenosis.  --  D1: There was a discrete 90 % stenosis at the ostium of the vessel  segment. The lesion was hazy, complex, and eccentric. There was ANDRY grade  3 flow through the vessel (brisk flow).  CX:   --  OM2: The vessel was large sized. There was a tubular 100 %  stenosis in the proximal third of the vessel segment. There was ANDRY grade  0 flow through the vessel (no flow).  RCA:   --  Proximal RCA: There was a tubular 99 % stenosis. The lesion was  hazy, complex,and eccentric. There was ANDRY grade 1 flow through the  vessel (slow flow without perfusion).  --  RPDA: The distal vessel was supplied by extensive collaterals from the  third obtuse marginal.  ARCH VESSELS: LIMA: Normal.  COMPLICATIONS: No complications occurred during the cath lab visit.  DIAGNOSTIC IMPRESSIONS: Unstable angina with minimal exertion (CCS cl III)  Severe native three vessel CAD with preserved LV function  The LIMA is widely patent    < end of copied text >    < from: TTE Echo Complete w/Doppler (01.10.20 @ 17:26) >  Summary:   1. Technically good study.   2. Hyperdynamic global left ventricular systolic function.   3. Left ventricular ejection fraction, by visual estimation, is >75%.   4. Spectral Doppler shows impaired relaxation pattern of left ventricular myocardial filling (Grade I diastolic dysfunction).   5. Mild thickening of the anterior mitral valve leaflet.   6. Trace mitral valve regurgitation.   7. Trivial pericardial effusion.    < end of copied text >        < from: Xray Chest 1 View AP/PA. (01.14.20 @ 15:53) >    Impression:    Status post sternotomy and CABG midline is in place.    < end of copied text >    ASSESSMENT AND PLAN:  In summary, MARGOT JESUS is a 54y Male with past medical history significant for presentation with intermittent chest pain with exertion, and chest heaviness resolving with rest without radiation. No SOB. Pt went to his PCP whom he had not seen x10 years who then referred him to cardiologist.    States daily compliance with aspirin 81 +FH CAD premature, HTN, HLD, non compliance, angina, former smoker/s/p nephrectomy for donor status. Cardiac catheterization with severe CAD, now s/p CABG 1/14/20    - Severe multivessel CAD with preserved LV function.  s/p CABG 1/14/20 (LIMA to LAD, SVG-PDA, SVG-OM, SVG-Diagonal). post op day 1. on ASA 325mg daily    - HTN. metoprolol 25mg BID. BP is controlled    - HL. eventual statin?. Hepatotoxicity with lipitor in the past. Would at least resume zetia.    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 Continue to monitor renal function. I's>O's.  No CHF on exam. Renal appreciated    - Respiratory status. Pt taking shallow breaths due to expected discomfort following open heart surgery. Not progressing adequately with incentive spirometry as per nursing.  The importance of taking pain medications to allow for appropriate incentive spirometry use and to allow for chest tube removal dw the pt.  The pt expresses understanding and will attempt to improve his use of the incentive spirometer.    - Vermillion radha removed. Hemodynamics pre swan removal reviewed.  Off of pressors. Pt on O2 via NC.    - Telemetry monitoring personally reviewed by me. SR    - radiologic imaging reviewed    - Laboratory data reviewed.    - I spoke with nursing at the bedside. Incentive spirometry use to be encouraged. Hemodynamics have remained stable overnight.    - Pt currently in chair. Mobilize as tolerated    - I have personally reviewed all obtainable prior records and data        Kiarra Salazar MD

## 2020-01-15 NOTE — PROGRESS NOTE ADULT - ASSESSMENT
54 year old male patient with PMHx of HTN, HLD, type 2 diabetes (HA1c 8.6), and s/p donor nephrectomy 2006 presented originally with worsening stable angina symptoms. Saw his PCP whom he had not seen x 10 years who then referred him to cardiologist and was found to have multivessel CAD on cath 01/10/20. Patient was scheduled for open heart surgery 1/13, but the surgery was postponed secondary to elevated Cr.  Nephrology was consulted, pt hydrated, renal US performed, and Cr returned back to baseline. Patient was cleared by nephrology and now s/p C4L (LIMA-LAD, SVG-PDA, SVG-OM, SVG-Diag) with R EVH 01/14. Extubated POD #0.

## 2020-01-15 NOTE — PROGRESS NOTE ADULT - PROBLEM SELECTOR PLAN 7
SCDs for DVT prophylaxis, will discuss starting chemical AC today at AM rounds.   Protonix for GI prophylaxis.  Continue with bowel regimen as needed postoperatively. 1.2cm left thyroid nodule incidentally seen on carotid US.   Plan for dedicated thyroid US and follow up as an outpatient.

## 2020-01-15 NOTE — PHYSICAL THERAPY INITIAL EVALUATION ADULT - ADDITIONAL COMMENTS
Pt lives with spouse in a [place] with 6 steps to enter. Spouse works.  Independent with all PTA, without devices.

## 2020-01-15 NOTE — PROGRESS NOTE ADULT - SUBJECTIVE AND OBJECTIVE BOX
Subjective: Patient lying in bed with head elevated in no acute distress. On O2 via NC. Admits to an episode of nausea last night which was resolved with the Zofran his nurse gave him. No c/o incisional pain at this time. Denies lightheadedness, dizziness, CP, palpitations, SOB, N/V, numbness/tingling in extremities, or any other current complaints.    T(C): 37.3 (01-15-20 @ 00:00), Max: 37.3 (01-15-20 @ 00:00)  HR: 78 (01-15-20 @ 00:00) (67 - 86)  BP: 115/62 (01-14-20 @ 20:00) (103/61 - 160/92)  ABP: 110/57 (01-15-20 @ 00:00) (90/55 - 142/75)  ABP(mean): 72 (01-15-20 @ 00:00) (65 - 94)  RR: 18 (01-15-20 @ 00:00) (10 - 34)  SpO2: 99% (01-15-20 @ 00:00) (96% - 100%)  CVP(mm Hg): 7 (01-15-20 @ 00:00) (7 - 349)  CO: 4.9 (01-15-20 @ 00:00) (4.7 - 5.6)  CI: 2.4 (01-15-20 @ 00:00) (2.4 - 2.8)  PA: 22/9 (01-15-20 @ 00:00) (22/9 - 34/22)   Mode: AC/ CMV (Assist Control/ Continuous Mandatory Ventilation)  RR (machine): 10  TV (machine): 800  FiO2: 60  PEEP: 5  PS: 10  MAP: 8  PIP: 26      I&O's Detail    13 Jan 2020 07:01  -  14 Jan 2020 07:00  --------------------------------------------------------  IN:    multiple electrolytes Injection Type 1multiple electrolytes Injection Type 1: 2400 mL    Oral Fluid: 1230 mL  Total IN: 3630 mL    OUT:    Voided: 3200 mL  Total OUT: 3200 mL    Total NET: 430 mL      14 Jan 2020 07:01  -  15 Jama 2020 01:00  --------------------------------------------------------  IN:    insulin regular Infusion: 29.5 mL    Lactated Ringers IV Bolus: 1000 mL    multiple electrolytes Injection Type 1multiple electrolytes Injection Type 1: 100 mL    niCARdipine Infusion: 22.5 mL    sodium chloride 0.9%.: 110 mL    sodium chloride 0.9%.: 55 mL    Solution: 170 mL    Solution: 100 mL    Solution: 250 mL    Solution: 150 mL  Total IN: 1987 mL    OUT:    Chest Tube: 125 mL    Chest Tube: 65 mL    Chest Tube: 45 mL    Indwelling Catheter - Urethral: 3235 mL  Total OUT: 3470 mL    Total NET: -1483 mL      LABS: All Lab data reviewed and analyzed                        9.4    15.67 )-----------( 163      ( 14 Jan 2020 14:27 )             29.1     01-14    139  |  103  |  13.0  ----------------------------<  154<H>  4.5   |  21.0<L>  |  1.04    Ca    9.2      14 Jan 2020 14:27  Mg     2.9     01-14    TPro  5.9<L>  /  Alb  4.2  /  TBili  0.8  /  DBili  x   /  AST  49<H>  /  ALT  41<H>  /  AlkPhos  53  01-14    PT/INR - ( 14 Jan 2020 14:27 )   PT: 17.8 sec;   INR: 1.53 ratio       PTT - ( 14 Jan 2020 14:27 )  PTT:32.7 sec    ABG - ( 14 Jan 2020 18:36 )  pH, Arterial: 7.40  pH, Blood: x     /  pCO2: 42    /  pO2: 225   / HCO3: 25    / Base Excess: 0.8   /  SaO2: 100       CAPILLARY BLOOD GLUCOSE  POCT Blood Glucose.: 133 mg/dL (15 Jama 2020 00:05)  POCT Blood Glucose.: 150 mg/dL (14 Jan 2020 23:09)  POCT Blood Glucose.: 158 mg/dL (14 Jan 2020 22:25)  POCT Blood Glucose.: 153 mg/dL (14 Jan 2020 21:00)  POCT Blood Glucose.: 185 mg/dL (14 Jan 2020 20:09)  POCT Blood Glucose.: 184 mg/dL (14 Jan 2020 17:58)  POCT Blood Glucose.: 189 mg/dL (14 Jan 2020 17:02)           RADIOLOGY: - Reviewed and analyzed     Today's CXR: pending  Last CXR:  < from: Xray Chest 1 View AP/PA. (01.14.20 @ 15:53) >  Findings:  Endotracheal tube and NG tube in place. Thomas-Joseph catheter in place. Left-sided chest tube and mediastinal drains. The heart is mildly enlarged. No focal consolidation. Apices hemidiaphragms unremarkable. Degenerative changes of the visualized osseous structures.  Sternotomy wires. Status post CABG.  Impression:  Status post sternotomy and CABG midline is in place.  < end of copied text >    < from: US Renal (01.14.20 @ 07:25) >  Right kidney:  13 cm. No renal mass, hydronephrosis or calculi. Increased echogenicity renal parenchyma parenchymal thinning compatible with medical renal disease.  Left kidney: Prior left nephrectomy. No residual or recurrent masses in the nephrectomy bed.  Incidental note of hepatic steatosis.  IMPRESSION:   Prior left hip fracture mean. Neck renal disease of the right kidney. No hydronephrosis.  < end of copied text >    < from: US Duplex Carotid Arteries Complete, Bilateral (01.10.20 @ 21:03) >  IMPRESSION:   1. 1.2 cm left lobe thyroid nodule. Dedicated thyroid ultrasound nonemergent basis.  2. No hemodynamically significant stenosis.   < end of copied text >      HOSPITAL MEDICATIONS: All medications reviewed and analyzed    MEDICATIONS  (STANDING):  acetaminophen  IVPB .. 1000 milliGRAM(s) IV Intermittent once  aspirin enteric coated 325 milliGRAM(s) Oral daily  cefuroxime  IVPB 1500 milliGRAM(s) IV Intermittent every 8 hours  chlorhexidine 2% Cloths 1 Application(s) Topical daily  mupirocin 2% Nasal 1 Application(s) Both Nostrils every 12 hours  niCARdipine Infusion 5 mG/Hr (25 mL/Hr) IV Continuous  norepinephrine Infusion 0.05 MICROgram(s)/kG/Min (8.166 mL/Hr) IV Continuous  ondansetron Injectable 4 milliGRAM(s) IV Push once  pantoprazole    Tablet 40 milliGRAM(s) Oral daily  polyethylene glycol 3350 17 Gram(s) Oral daily  sodium chloride 0.9%. 1000 milliLiter(s) (10 mL/Hr) IV Continuous   sodium chloride 0.9%. 1000 milliLiter(s) (5 mL/Hr) IV Continuous   vancomycin  IVPB 1000 milliGRAM(s) IV Intermittent every 12 hours      Physical Exam  Neuro: A+O x 3, non-focal, speech clear and intact.  HEENT:  NCAT, PERRL. No conjuctival edema or icterus, no thrush.  No ETT or NGT/OGT.  Neck:  RIJ introducer/swan with site C/D/I. Supple, trachea midline, no tracheostomy.  Pulm: Decreased BSs at b/l bases, no rales/rhonchi/wheezing appreciated, no accessory muscle use noted, SpO2 remains stable on 2L O2 via NC.   Chest:        mediastinal CTs and left pleural CT all with dressings intact and no air leak, no subQ emphysema noted.       +PW (settings: VVI, rate:40 , mA: 10, sensitivity: 0.8).  CV: regular rate, regular rhythm, +S1S2, no murmur or rub noted.  Abd: soft, NT, ND, + BS.  : lomeli in situ to bedside drainage with yellow urine.  Ext: LUTHER x 4, no edema, no cyanosis or clubbing, distal motor/neuro/circ intact.  Skin: warm, dry, well perfused.  Incisions: midsternal C/D/I/stable w/ provena dressing, RLE C/D/I w/ dressing and Ace wrap Subjective: Patient lying in bed with head elevated in no acute distress. On O2 via NC. Admits to an episode of nausea last night which was resolved with the Zofran his nurse gave him. No c/o incisional pain at this time. Denies lightheadedness, dizziness, CP, palpitations, SOB, N/V, numbness/tingling in extremities, or any other current complaints.    T(C): 37.3 (01-15-20 @ 00:00), Max: 37.3 (01-15-20 @ 00:00)  HR: 78 (01-15-20 @ 00:00) (67 - 86)  BP: 115/62 (01-14-20 @ 20:00) (103/61 - 160/92)  ABP: 110/57 (01-15-20 @ 00:00) (90/55 - 142/75)  ABP(mean): 72 (01-15-20 @ 00:00) (65 - 94)  RR: 18 (01-15-20 @ 00:00) (10 - 34)  SpO2: 99% (01-15-20 @ 00:00) (96% - 100%)  CVP(mm Hg): 7 (01-15-20 @ 00:00) (7 - 349)  CO: 4.9 (01-15-20 @ 00:00) (4.7 - 5.6)  CI: 2.4 (01-15-20 @ 00:00) (2.4 - 2.8)  PA: 22/9 (01-15-20 @ 00:00) (22/9 - 34/22)   Mode: AC/ CMV (Assist Control/ Continuous Mandatory Ventilation)  RR (machine): 10  TV (machine): 800  FiO2: 60  PEEP: 5  PS: 10  MAP: 8  PIP: 26      I&O's Detail    13 Jan 2020 07:01  -  14 Jan 2020 07:00  --------------------------------------------------------  IN:    multiple electrolytes Injection Type 1multiple electrolytes Injection Type 1: 2400 mL    Oral Fluid: 1230 mL  Total IN: 3630 mL    OUT:    Voided: 3200 mL  Total OUT: 3200 mL    Total NET: 430 mL      14 Jan 2020 07:01  -  15 Jama 2020 01:00  --------------------------------------------------------  IN:    insulin regular Infusion: 29.5 mL    Lactated Ringers IV Bolus: 1000 mL    multiple electrolytes Injection Type 1multiple electrolytes Injection Type 1: 100 mL    niCARdipine Infusion: 22.5 mL    sodium chloride 0.9%.: 110 mL    sodium chloride 0.9%.: 55 mL    Solution: 170 mL    Solution: 100 mL    Solution: 250 mL    Solution: 150 mL  Total IN: 1987 mL    OUT:    Chest Tube: 125 mL    Chest Tube: 65 mL    Chest Tube: 45 mL    Indwelling Catheter - Urethral: 3235 mL  Total OUT: 3470 mL    Total NET: -1483 mL      LABS: All Lab data reviewed and analyzed                        9.4    15.67 )-----------( 163      ( 14 Jan 2020 14:27 )             29.1     01-14    139  |  103  |  13.0  ----------------------------<  154<H>  4.5   |  21.0<L>  |  1.04    Ca    9.2      14 Jan 2020 14:27  Mg     2.9     01-14    TPro  5.9<L>  /  Alb  4.2  /  TBili  0.8  /  DBili  x   /  AST  49<H>  /  ALT  41<H>  /  AlkPhos  53  01-14    PT/INR - ( 14 Jan 2020 14:27 )   PT: 17.8 sec;   INR: 1.53 ratio       PTT - ( 14 Jan 2020 14:27 )  PTT:32.7 sec    ABG - ( 14 Jan 2020 18:36 )  pH, Arterial: 7.40  pH, Blood: x     /  pCO2: 42    /  pO2: 225   / HCO3: 25    / Base Excess: 0.8   /  SaO2: 100       CAPILLARY BLOOD GLUCOSE  POCT Blood Glucose.: 133 mg/dL (15 Jama 2020 00:05)  POCT Blood Glucose.: 150 mg/dL (14 Jan 2020 23:09)  POCT Blood Glucose.: 158 mg/dL (14 Jan 2020 22:25)  POCT Blood Glucose.: 153 mg/dL (14 Jan 2020 21:00)  POCT Blood Glucose.: 185 mg/dL (14 Jan 2020 20:09)  POCT Blood Glucose.: 184 mg/dL (14 Jan 2020 17:58)  POCT Blood Glucose.: 189 mg/dL (14 Jan 2020 17:02)           RADIOLOGY: - Reviewed and analyzed     Today's CXR: pending  Last CXR:  < from: Xray Chest 1 View AP/PA. (01.14.20 @ 15:53) >  Findings:  Endotracheal tube and NG tube in place. [place]-Joseph catheter in place. Left-sided chest tube and mediastinal drains. The heart is mildly enlarged. No focal consolidation. Apices hemidiaphragms unremarkable. Degenerative changes of the visualized osseous structures.  Sternotomy wires. Status post CABG.  Impression:  Status post sternotomy and CABG midline is in place.  < end of copied text >    < from: US Renal (01.14.20 @ 07:25) >  Right kidney:  13 cm. No renal mass, hydronephrosis or calculi. Increased echogenicity renal parenchyma parenchymal thinning compatible with medical renal disease.  Left kidney: Prior left nephrectomy. No residual or recurrent masses in the nephrectomy bed.  Incidental note of hepatic steatosis.  IMPRESSION:   Prior left hip fracture mean. Neck renal disease of the right kidney. No hydronephrosis.  < end of copied text >    < from: US Duplex Carotid Arteries Complete, Bilateral (01.10.20 @ 21:03) >  IMPRESSION:   1. 1.2 cm left lobe thyroid nodule. Dedicated thyroid ultrasound nonemergent basis.  2. No hemodynamically significant stenosis.   < end of copied text >      HOSPITAL MEDICATIONS: All medications reviewed and analyzed    MEDICATIONS  (STANDING):  acetaminophen  IVPB .. 1000 milliGRAM(s) IV Intermittent once  aspirin enteric coated 325 milliGRAM(s) Oral daily  cefuroxime  IVPB 1500 milliGRAM(s) IV Intermittent every 8 hours  chlorhexidine 2% Cloths 1 Application(s) Topical daily  mupirocin 2% Nasal 1 Application(s) Both Nostrils every 12 hours  niCARdipine Infusion 5 mG/Hr (25 mL/Hr) IV Continuous  norepinephrine Infusion 0.05 MICROgram(s)/kG/Min (8.166 mL/Hr) IV Continuous  ondansetron Injectable 4 milliGRAM(s) IV Push once  pantoprazole    Tablet 40 milliGRAM(s) Oral daily  polyethylene glycol 3350 17 Gram(s) Oral daily  sodium chloride 0.9%. 1000 milliLiter(s) (10 mL/Hr) IV Continuous   sodium chloride 0.9%. 1000 milliLiter(s) (5 mL/Hr) IV Continuous   vancomycin  IVPB 1000 milliGRAM(s) IV Intermittent every 12 hours      Physical Exam  Neuro: A+O x 3, non-focal, speech clear and intact.  HEENT:  NCAT, PERRL. No conjuctival edema or icterus, no thrush.  No ETT or NGT/OGT.  Neck:  RIJ introducer/swan with site C/D/I. Supple, trachea midline, no tracheostomy.  Pulm: Decreased BSs at b/l bases, no rales/rhonchi/wheezing appreciated, no accessory muscle use noted, SpO2 remains stable on 2L O2 via NC.   Chest:        mediastinal CTs and left pleural CT all with dressings intact and no air leak, no subQ emphysema noted.       +PW (settings: VVI, rate:40 , mA: 10, sensitivity: 0.8).  CV: regular rate, regular rhythm, +S1S2, no murmur or rub noted.  Abd: soft, NT, ND, + BS.  : lomeli in situ to bedside drainage with yellow urine.  Ext: LUTHER x 4, no edema, no cyanosis or clubbing, distal motor/neuro/circ intact.  Skin: warm, dry, well perfused.  Incisions: midsternal C/D/I/stable w/ provena dressing, RLE C/D/I w/ dressing and Ace wrap

## 2020-01-15 NOTE — CONSULT NOTE ADULT - ASSESSMENT
T2DM  -shaun   new DX- may have had symptoms for 1-2 years   - will need DM educaiton   meter teach    insulin teach    dw pt informed that needs to have excellent glycemic control  for proper wound healign - he is ok going on insulin    - consider MFN   - check Cpeptide and ARNOLD-  ab  as pt withiut FH DM   dw pt importance of good diet control     reviewd with pt dietary choices        Thryoid nodule  s/p benign FNA in 2010 - never had follwoup sonos done    will need to follwoupas outpt    TFT wnl     CV - s/p CABG x 4

## 2020-01-15 NOTE — PHYSICAL THERAPY INITIAL EVALUATION ADULT - PATIENT/FAMILY/SIGNIFICANT OTHER GOALS STATEMENT, PT EVAL
[de-identified] : 35-year-old female followed for an esophageal ulcer, H. pylori infection, and pancreatitis.\par Her most recent endoscopy showed resolution of the esophageal ulcer and no evidence of H. pylori. Recent admission for pancreatitis was uneventful. MRI and CT did not reveal any lesions. She notes postprandial abdominal pain.
To go home

## 2020-01-15 NOTE — PROGRESS NOTE ADULT - SUBJECTIVE AND OBJECTIVE BOX
ICU Vital Signs Last 24 Hrs,    T(C): 36.9 (15 Jama 2020 07:15), Max: 37.3 (15 Jama 2020 00:00)  T(F): 98.4 (15 Jama 2020 07:15), Max: 99.1 (15 Jama 2020 00:00)  HR: 76 (15 Jama 2020 10:00) (75 - 86)  BP: 115/62 (2020 20:00) (115/62 - 115/62)  BP(mean): 81 (2020 20:00) (81 - 81)  ABP: 109/56 (15 Jama 2020 10:00) (90/55 - 142/75)  ABP(mean): 73 (15 Jama 2020 10:00) (65 - 94)  RR: 20 (15 Jama 2020 10:00) (10 - 34)  SpO2: 100% (15 Jama 2020 10:00) (99% - 100%)    138    |  105    |  15.0   ----------------------------<  122<H>  Ca:8.3<L> (15 Jama 2020 03:03)  4.2     |  22.0   |  1.24       eGFR if Non : 65  eGFR if : 75    TPro  5.9<L>  /  Alb  4.2    /  TBili  0.8    /  DBili  x      /  AST  49<H>  /  ALT  41<H>  /  AlkPhos  53     2020 14:27                               9.5<L>  11.78<H> )-----------( 181      ( 15 Jama 2020 03:03 )                  28.6<L>    Phos:-- M.3 mg/dL PTH:-- Uric acid:-- Serum Osm:--  Ferritin:-- Iron:-- TIBC:-- Tsat:--  B12:-- TSH:-- (01-15 @ 03:03)    Urinalysis Basic - ( 2020 18:30 )  Color: Yellow / Appearance: Clear / S.020 / pH: x  Gluc: x / Ketone: Trace<!>  / Bili: Negative / Urobili: Negative mg/dL   Blood: x / Protein: Negative mg/dL / Nitrite: Negative   Leuk Esterase: Negative / RBC: 0-2 /HPF / WBC 0-2   Sq Epi: x / Non Sq Epi: Occasional / Bacteria: Occasional<!>    UProt:-- UCr:117 mg/dL P/C Ratio:0.1 Ratio 24 hour Prot:-- UVol:-- CrCl:--  Jenn:112 mmol/L UOsm:-- UVol:-- UCl:-- UK:-- ( @ 07:36)    Progress Note:   · Provider Specialty	Nephrology	      · Subjective and Objective: 	  [facility] DIVISION OF KIDNEY DISEASES AND HYPERTENSION -- FOLLOW UP NOTE  --------------------------------------------------------------------------------  Chief Complaint: AUBREE    24 hour events/subjective: In OR,     PAST HISTORY  --------------------------------------------------------------------------------  No significant changes to PMH, PSH, FHx, SHx, unless otherwise noted    ALLERGIES & MEDICATIONS  --------------------------------------------------------------------------------  Allergies  No Known Allergies    Intolerances  ohs (Unknown)    Standing Inpatient Medications  albumin human 25% IVPB 100 milliLiter(s) IV Intermittent every 1 hour  cefuroxime  IVPB 1500 milliGRAM(s) IV Intermittent once  desmopressin IVPB 26 MICROGram(s) IV Intermittent once  vancomycin  IVPB 1000 milliGRAM(s) IV Intermittent once    PRN Inpatient Medications    REVIEW OF SYSTEMS : NA,   --------------------------------------------------------------------------------  VITALS/PHYSICAL EXAM  --------------------------------------------------------------------------------  T(C): 37 (20 08:00), Max: 37 (20 08:00)  HR: 68 (20 08:00) (67 - 83)  BP: 115/89 (20 08:00) (100/58 - 160/92)  RR: 25 (20 08:00) (13 - 25)  SpO2: 100% (20 08:00) (96% - 100%)    Height (cm): 170 (20 04:31)  Weight (kg): 87.1 (20 04:31)  BMI (kg/m2): 30.1 (20 04:31)  BSA (m2): 1.99 (20 04:31)    20 07:01  -  20 @ 07:00  --------------------------------------------------------  IN: 3630 mL / OUT: 3200 mL / NET: 430 mL    20 07:01  -  20 @ 09:46  --------------------------------------------------------  IN: 100 mL / OUT: 0 mL / NET: 100 mL    Physical Exam: Not  Done,   	  LABS/STUDIES  --------------------------------------------------------------------------------              13.3   8.09  >-----------<  306      [20 02:38]              42.9     137  |  99  |  19.0  ----------------------------<  124      [20 02:38]  3.8   |  23.0  |  1.41        Ca     8.8     [20 02:38]      Mg     2.3     [20 02:38]    TPro  7.7  /  Alb  4.2  /  TBili  0.4  /  DBili  x   /  AST  46  /  ALT  74  /  AlkPhos  109  [20 11:18]    Creatinine Trend:  SCr 1.41 [ 02:38]  SCr 1.25 [ 11:18]  SCr 1.36 [ 04:04]  SCr 0.99 [01-10 @ 13:33]  SCr 1.17 [01-10 @ 10:50]    Urinalysis - [20 18:30]      Color Yellow / Appearance Clear / SG 1.020 / pH 6.0      Gluc 250 / Ketone Trace  / Bili Negative / Urobili Negative       Blood Trace / Protein Negative / Leuk Est Negative / Nitrite Negative      RBC 0-2 / WBC 0-2 / Hyaline  / Gran  / Sq Epi  / Non Sq Epi Occasional / Bacteria Occasional    Urine Creatinine 117      [20 07:36]  Urine Protein 8.0      [20 07:36]  Urine Sodium 112      [20 07:36]  Protein/Creatinine Ratio, Urine  0.1 (20 07:36)      HbA1c 8.6      [01-10-20 @ 12:33]  TSH 1.12      [01-10-20 @ 13:33]  Lipid: chol 431, , HDL 33,       [01-10-20 @ 10:50]    HCV 0.06, Nonreact      [01-10-20 @ 22:04]  HIV Nonreact      [01-10-20 @ 10:50]    EXAM:  US KIDNEY(S)                          PROCEDURE DATE:  2020      INTERPRETATION:  CLINICAL INFORMATION: Rising creatinine    COMPARISON: CT abdomen pelvis of 2015    TECHNIQUE: Sonography of the kidneys and bladder.     FINDINGS:    Right kidney:  13 cm. No renal mass, hydronephrosis or calculi. Increased echogenicity renal parenchyma parenchymal thinning compatible with medical renal disease.    Left kidney: Prior left nephrectomy. No residual or recurrent masses in the nephrectomy bed.    Incidental note of hepatic steatosis.                    54 year old man with history of HTN, HLD, nephrolithiasis, s/p left donor nephrectomy presented with chest pain.  s/p LHC on 01/10 significant for multivessel disease.     S/P CABG;     He had a nephrectomy in ; donated left kidney to his mother in law. He has never followed with a nephrologist.    1) Solitary Rt  kidney s/p donor left nephrectomy  2) AUBREE  3) Multivessel CAD  4) HTN    S/P CABG,

## 2020-01-15 NOTE — PROGRESS NOTE ADULT - ASSESSMENT
In summary, MARGOT JESUS is a 54y Male with past medical history significant for presentation with intermittent chest pain with exertion, and chest heaviness resolving with rest without radiation. No SOB. Pt went to his PCP whom he had not seen x10 years who then referred him to cardiologist.    States daily compliance with aspirin 81 +FH CAD premature, HTN, HLD, non compliance, angina, former smoker/s/p nephrectomy for donor status. Cardiac catheterization with severe CAD, now s/p CABG 1/14/20    - Severe multivessel CAD with preserved LV function.  s/p CABG 1/14/20 (LIMA to LAD, SVG - PDA, SVG- OM, SVG- Diagonal).     post op day 1. on ASA 325mg daily    - HTN. metoprolol 25mg BID. BP is controlled    - Hepatotoxicity with lipitor in the past.     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 Continue to monitor  Scr., & eGFR. I's>O's.  No CHF on exam.     - Gratiot radha removed. Off of pressors. Pt on O2 via NC.    - Telemetry : SR    - radiologic imaging reviewed    - Laboratory data reviewed.    - Hemodynamics have remained stable overnight.

## 2020-01-15 NOTE — CONSULT NOTE ADULT - SUBJECTIVE AND OBJECTIVE BOX
HPI:  HPI: 53 y/o male with recent intermittent chest pain with exertion, and chest heaviness resolving with rest.  SPt now Sp CABG     just dx with T2DM on this admsiion but had been  having symtpom s of nocutria x 2  and increased thirst over past 1-2 years         Co Morbidities:  +FH CAD premature, HTN, HLD, non compliance, angina, former smoker, /s/p nephrectomy for donor status          LHC: 2006 prior to nephrectomy        PAST MEDICAL & SURGICAL HISTORY:  Statin intolerance: elevated liver enzymes- had been on welchol but stopped many years ago   Carotid disease, bilateral: mild  HLD (hyperlipidemia)  HTN (hypertension)  Non-compliance: with medical care  thyroid nodule s/p benign FNA 10 years ago- nevenr did folllow up   AP (angina pectoris)  Obesity  H/O elbow surgery: right  S/p nephrectomy: left side for donor status      FAMILY HISTORY:    NO DM in family    no thryoid trouble ib family   SOCIAL HISTORY:    REVIEW OF SYSTEMS:    Constitutional: No fever, no chills, no change in weight.    Eyes: No eye swelling,no  blurry vision, no redness, no loss of vision.    Neck: No neck pain, no change in voice.    Lungs: pain when trying to take deep breath   CV:+ chest pain at inciison , no palpitations,  GI: No nausea, no vomiting, no constipation, no diarrhea, no abdominal pain    : No urinary frequency, no blood in urine, no urinary burning, no difficulty voiding.    Musculoskeletal: No muscle pain, no joint pain, no swelling.    Skin: No rash, no infections.    Neurologic: No headaches, no weakness, no burning or pain in feet, no tremor.    Endocrine: No heat intolerance, no cold intolerance, no increased sweating, no shakiness between meals.    Psych: No depression, no anxiety, no trouble concentrating    MEDICATIONS  (STANDING):  aspirin enteric coated 325 milliGRAM(s) Oral daily  cefuroxime  IVPB 1500 milliGRAM(s) IV Intermittent every 8 hours  dextrose 50% Injectable 12.5 Gram(s) IV Push once  dextrose 50% Injectable 25 Gram(s) IV Push once  dextrose 50% Injectable 25 Gram(s) IV Push once  enoxaparin Injectable 40 milliGRAM(s) SubCutaneous daily  insulin lispro (HumaLOG) corrective regimen sliding scale   SubCutaneous Before meals and at bedtime  insulin lispro Injectable (HumaLOG) 7 Unit(s) SubCutaneous three times a day before meals  lidocaine   Patch 2 Patch Transdermal daily  metoprolol tartrate 25 milliGRAM(s) Oral two times a day  ondansetron Injectable 4 milliGRAM(s) IV Push once  pantoprazole    Tablet 40 milliGRAM(s) Oral daily  polyethylene glycol 3350 17 Gram(s) Oral daily  senna 2 Tablet(s) Oral at bedtime  vancomycin  IVPB 1000 milliGRAM(s) IV Intermittent every 12 hours    MEDICATIONS  (PRN):  oxycodone    5 mG/acetaminophen 325 mG 1 Tablet(s) Oral every 4 hours PRN Moderate Pain (4 - 6)  oxycodone    5 mG/acetaminophen 325 mG 2 Tablet(s) Oral every 4 hours PRN Severe Pain (7 - 10)      Allergies    No Known Allergies    Intolerances    Lipitor (Hepatotoxicity)  ohs (Unknown)        CAPILLARY BLOOD GLUCOSE    CAPILLARY BLOOD GLUCOSE      POCT Blood Glucose.: 105 mg/dL (15 Jama 2020 22:28)  POCT Blood Glucose.: 183 mg/dL (15 Jama 2020 17:16)  POCT Blood Glucose.: 113 mg/dL (15 Jama 2020 13:18)  POCT Blood Glucose.: 127 mg/dL (15 Jama 2020 11:10)  POCT Blood Glucose.: 158 mg/dL (15 Jama 2020 10:03)  POCT Blood Glucose.: 136 mg/dL (15 Jama 2020 08:10)  POCT Blood Glucose.: 139 mg/dL (15 Jama 2020 06:23)  POCT Blood Glucose.: 130 mg/dL (15 Jama 2020 04:19)  POCT Blood Glucose.: 126 mg/dL (15 Jama 2020 02:19)    POCT Blood Glucose.: 105 mg/dL (15 Jama 2020 22:28)      PHYSICAL EXAM:    Vital Signs Last 24 Hrs  T(C): 36.8 (15 Jama 2020 16:00), Max: 37.2 (15 Jama 2020 01:00)  T(F): 98.2 (15 Jama 2020 16:00), Max: 99 (15 Jama 2020 01:00)  HR: 79 (15 Jama 2020 22:00) (75 - 85)  BP: 103/65 (15 Jama 2020 22:00) (89/50 - 114/66)  BP(mean): 78 (15 Jama 2020 22:00) (65 - 85)  RR: 16 (15 Jama 2020 22:00) (16 - 28)  SpO2: 95% (15 Jama 2020 22:00) (95% - 100%)    General appearance: Well developed, well nourished.    Eyes: Pupils equal and reactive to light. EOM full. No exophthalmos.    Neck: Trachea midline. hard to feel hyroidas has bandage over neckarea .    Lungs: Normal respiratory excursion. Lungs clear.    CV: Regular cardiac rhythm. No murmur. Carotid and pedal pulses intact.    Abdomen: Soft, non tender, no organomegaly or mass.    Musculoskeletal: No cyanosis, clubbing, or edema. No pedal lesions.    Skin: Warm and moist. No rash. No acanthosis.    Neuro: Cranial nerves intact. Normal motor and sensory function. DTR's normal.    Psych: Normal affect, good judgement.            LABS:                        9.5    11.78 )-----------( 181      ( 15 Jama 2020 03:03 )             28.6     01-15    138  |  105  |  15.0  ----------------------------<  122<H>  4.2   |  22.0  |  1.24    Ca    8.3<L>      15 Jama 2020 03:03  Mg     2.3     01-15    TPro  5.9<L>  /  Alb  4.2  /  TBili  0.8  /  DBili  x   /  AST  49<H>  /  ALT  41<H>  /  AlkPhos  53  01-14      LIVER FUNCTIONS - ( 14 Jan 2020 14:27 )  Alb: 4.2 g/dL / Pro: 5.9 g/dL / ALK PHOS: 53 U/L / ALT: 41 U/L / AST: 49 U/L / GGT: x           Thyroid Stimulating Hormone, Serum (01.10.20 @ 13:33)    Thyroid Stimulating Hormone, Serum: 1.12 uIU/mL      T4, Serum: 7.6 ug/dL (01-10 @ 13:33)      CAPILLARY BLOOD GLUCOSE  CAPILLARY BLOOD GLUCOSE      POCT Blood Glucose.: 105 mg/dL (15 Jama 2020 22:28)  POCT Blood Glucose.: 183 mg/dL (15 Jama 2020 17:16)  POCT Blood Glucose.: 113 mg/dL (15 Jama 2020 13:18)  POCT Blood Glucose.: 127 mg/dL (15 Jama 2020 11:10)  POCT Blood Glucose.: 158 mg/dL (15 Jama 2020 10:03)  POCT Blood Glucose.: 136 mg/dL (15 Jama 2020 08:10)  POCT Blood Glucose.: 139 mg/dL (15 Jama 2020 06:23)  POCT Blood Glucose.: 130 mg/dL (15 Jama 2020 04:19)  POCT Blood Glucose.: 126 mg/dL (15 Jama 2020 02:19)      RADIOLOGY & ADDITIONAL STUDIES:

## 2020-01-16 LAB
ANION GAP SERPL CALC-SCNC: 14 MMOL/L — SIGNIFICANT CHANGE UP (ref 5–17)
BUN SERPL-MCNC: 18 MG/DL — SIGNIFICANT CHANGE UP (ref 8–20)
CALCIUM SERPL-MCNC: 8.4 MG/DL — LOW (ref 8.6–10.2)
CHLORIDE SERPL-SCNC: 102 MMOL/L — SIGNIFICANT CHANGE UP (ref 98–107)
CO2 SERPL-SCNC: 22 MMOL/L — SIGNIFICANT CHANGE UP (ref 22–29)
CREAT SERPL-MCNC: 1.23 MG/DL — SIGNIFICANT CHANGE UP (ref 0.5–1.3)
GLUCOSE BLDC GLUCOMTR-MCNC: 120 MG/DL — HIGH (ref 70–99)
GLUCOSE BLDC GLUCOMTR-MCNC: 144 MG/DL — HIGH (ref 70–99)
GLUCOSE BLDC GLUCOMTR-MCNC: 146 MG/DL — HIGH (ref 70–99)
GLUCOSE BLDC GLUCOMTR-MCNC: 74 MG/DL — SIGNIFICANT CHANGE UP (ref 70–99)
GLUCOSE SERPL-MCNC: 126 MG/DL — HIGH (ref 70–115)
HCT VFR BLD CALC: 30.3 % — LOW (ref 39–50)
HGB BLD-MCNC: 9.6 G/DL — LOW (ref 13–17)
MAGNESIUM SERPL-MCNC: 2.3 MG/DL — SIGNIFICANT CHANGE UP (ref 1.6–2.6)
MCHC RBC-ENTMCNC: 27.7 PG — SIGNIFICANT CHANGE UP (ref 27–34)
MCHC RBC-ENTMCNC: 31.7 GM/DL — LOW (ref 32–36)
MCV RBC AUTO: 87.6 FL — SIGNIFICANT CHANGE UP (ref 80–100)
PLATELET # BLD AUTO: 185 K/UL — SIGNIFICANT CHANGE UP (ref 150–400)
POTASSIUM SERPL-MCNC: 4 MMOL/L — SIGNIFICANT CHANGE UP (ref 3.5–5.3)
POTASSIUM SERPL-SCNC: 4 MMOL/L — SIGNIFICANT CHANGE UP (ref 3.5–5.3)
RBC # BLD: 3.46 M/UL — LOW (ref 4.2–5.8)
RBC # FLD: 13.9 % — SIGNIFICANT CHANGE UP (ref 10.3–14.5)
SODIUM SERPL-SCNC: 138 MMOL/L — SIGNIFICANT CHANGE UP (ref 135–145)
WBC # BLD: 12.42 K/UL — HIGH (ref 3.8–10.5)
WBC # FLD AUTO: 12.42 K/UL — HIGH (ref 3.8–10.5)

## 2020-01-16 PROCEDURE — 99232 SBSQ HOSP IP/OBS MODERATE 35: CPT

## 2020-01-16 PROCEDURE — 71045 X-RAY EXAM CHEST 1 VIEW: CPT | Mod: 26

## 2020-01-16 PROCEDURE — 93010 ELECTROCARDIOGRAM REPORT: CPT

## 2020-01-16 PROCEDURE — 99233 SBSQ HOSP IP/OBS HIGH 50: CPT

## 2020-01-16 PROCEDURE — 71045 X-RAY EXAM CHEST 1 VIEW: CPT | Mod: 26,77

## 2020-01-16 RX ORDER — INSULIN GLARGINE 100 [IU]/ML
28 INJECTION, SOLUTION SUBCUTANEOUS AT BEDTIME
Refills: 0 | Status: DISCONTINUED | OUTPATIENT
Start: 2020-01-17 | End: 2020-01-18

## 2020-01-16 RX ORDER — HUMAN INSULIN 100 [IU]/ML
16 INJECTION, SUSPENSION SUBCUTANEOUS
Refills: 0 | Status: COMPLETED | OUTPATIENT
Start: 2020-01-17 | End: 2020-01-17

## 2020-01-16 RX ADMIN — INSULIN GLARGINE 28 UNIT(S): 100 INJECTION, SOLUTION SUBCUTANEOUS at 08:09

## 2020-01-16 RX ADMIN — OXYCODONE AND ACETAMINOPHEN 1 TABLET(S): 5; 325 TABLET ORAL at 09:30

## 2020-01-16 RX ADMIN — OXYCODONE AND ACETAMINOPHEN 1 TABLET(S): 5; 325 TABLET ORAL at 14:00

## 2020-01-16 RX ADMIN — Medication 25 MILLIGRAM(S): at 18:04

## 2020-01-16 RX ADMIN — LIDOCAINE 2 PATCH: 4 CREAM TOPICAL at 19:30

## 2020-01-16 RX ADMIN — POLYETHYLENE GLYCOL 3350 17 GRAM(S): 17 POWDER, FOR SOLUTION ORAL at 12:42

## 2020-01-16 RX ADMIN — LIDOCAINE 2 PATCH: 4 CREAM TOPICAL at 12:42

## 2020-01-16 RX ADMIN — Medication 7 UNIT(S): at 12:41

## 2020-01-16 RX ADMIN — Medication 100 MILLIGRAM(S): at 08:09

## 2020-01-16 RX ADMIN — OXYCODONE AND ACETAMINOPHEN 1 TABLET(S): 5; 325 TABLET ORAL at 08:30

## 2020-01-16 RX ADMIN — Medication 325 MILLIGRAM(S): at 12:42

## 2020-01-16 RX ADMIN — Medication 7 UNIT(S): at 18:04

## 2020-01-16 RX ADMIN — Medication 250 MILLIGRAM(S): at 09:08

## 2020-01-16 RX ADMIN — SENNA PLUS 2 TABLET(S): 8.6 TABLET ORAL at 22:38

## 2020-01-16 RX ADMIN — Medication 25 MILLIGRAM(S): at 06:17

## 2020-01-16 RX ADMIN — Medication 7 UNIT(S): at 08:09

## 2020-01-16 RX ADMIN — OXYCODONE AND ACETAMINOPHEN 1 TABLET(S): 5; 325 TABLET ORAL at 13:24

## 2020-01-16 RX ADMIN — ENOXAPARIN SODIUM 40 MILLIGRAM(S): 100 INJECTION SUBCUTANEOUS at 22:38

## 2020-01-16 RX ADMIN — PANTOPRAZOLE SODIUM 40 MILLIGRAM(S): 20 TABLET, DELAYED RELEASE ORAL at 12:42

## 2020-01-16 NOTE — PROGRESS NOTE ADULT - ASSESSMENT
T2DM    Cont Current RX  Lantus dc for joanna bc of  low BS   DM educaiton   insulin teach   nutriotn consult asnwered pt questiosn about DM and diet    thryoid nodule- follwoup as outpt T2DM    Cont Current RX   DM educaiton   insulin teach   nutriotn consult asnwered pt questiosn about DM and diet    thryoid nodule- follwoup as outpt

## 2020-01-16 NOTE — PROGRESS NOTE ADULT - ASSESSMENT
a 54y Male with past medical history significant for presentation with intermittent chest pain with exertion, and chest heaviness resolving with rest without radiation. No SOB. Pt went to his PCP whom he had not seen x10 years who then referred him to cardiologist.    States daily compliance with aspirin 81 +FH CAD premature, HTN, HLD, non compliance, angina, former smoker/s/p nephrectomy for donor status. Cardiac catheterization with severe CAD, now s/p CABG 1/14/20    - Severe multivessel CAD with preserved LV function.  s/p CABG 1/14/20 (LIMA to LAD, SVG - PDA, SVG- OM, SVG- Diagonal).     - HTN. metoprolol 25mg BID. 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 Continue to monitor  Scr., & eGFR. I's>O's  -24-hour urine, protein/creatinine clearance ordered  - Hemodynamics stable a 54y Male with past medical history significant for presentation with intermittent chest pain with exertion, and chest heaviness resolving with rest without radiation. No SOB. Pt went to his PCP whom he had not seen x10 years who then referred him to cardiologist.    States daily compliance with aspirin 81 +FH CAD premature, HTN, HLD, non compliance, angina, former smoker/s/p nephrectomy for donor status. Cardiac catheterization with severe CAD, now s/p CABG 1/14/20    - Severe multivessel CAD with preserved LV function.  s/p CABG 1/14/20 (LIMA to LAD, SVG - PDA, SVG- OM, SVG- Diagonal).     - HTN. metoprolol 25mg BID. 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 Continue to monitor  Scr., & eGFR. I's>O's  -24-hour urine, protein/creatinine clearance ordered  - Hemodynamics stable,    Op F.U When discharged, D/W The RN,     I was Physically Present for the key portions of the Evaluation & management ( E/M ) Service provided.    I agree with the above History  , Physical examination & Treatment Plans,    I have Reviewed , Modified or appended where appropriate,

## 2020-01-16 NOTE — PROGRESS NOTE ADULT - SUBJECTIVE AND OBJECTIVE BOX
Subjective:  55yo very pleasant M, resting comfortable, c/o some incisional discomfort.      HPI:  HPI: 53 y/o male with recent intermittent chest pain with exertion, and chest heaviness resolving with rest.  Saw his PCP whom he had not seen x10 years who then referred him to cardiologist.     States daily compliance with aspirin 81 mg po.      ROS: Denies cough, palpitation, DIETRICH, lightheadedness, or near syncope/syncope    Co Morbidities:  +FH CAD premature, HTN, HLD, non compliance, angina, former smoker, /s/p nephrectomy for donor status      ANGINAL/ISCHEMIC SS: Class III    ECHO: (WITHIN 6 MONTHS) Dec 2019                                                NON INVASIVE TESTING: not rendered    LHC:  prior to nephrectomy    ANTIANGINAL MEDICATIONS: None    ASA 2  MALLAMPATI 2  BR 0.8%    Attending physical exam:    GENERAL: Well-developed, well-nourished patient in no acute distress.  HEENT: Normocephalic, atraumatic, extraocular muscles intact, PERRLA, anicteric sclera, conjunctiva without injection  Neck: Supple, no carotid bruits bilaterally, no JVD  Chest: Clear to auscultation bilaterally without wheezes or rhonchi, normal I:E ratio  Cardiac: Regular rate and rhythm S1-S2 without gallops murmurs or rubs  Abdomen: The abdomen is scaphoid, soft, nontender and nondistended.  Positive bowel sounds.  There is no hepatosplenomegaly.  There are no masses or fascial defects appreciated.  Extremities: The extremities are warm and well-perfused.  There is no cyanosis clubbing or edema  Vascular: Carotid, radial, and femoral pulses are 2-3+ bilaterally.  Dorsalis pedis and posterior tibial artery pulses are 1-2+ bilaterally.  no peripheral varicosities.  Neuro: The patient is neurologically intact.  There are no gross motor deficits.  Gait not assessed  Psychiatric: The patient is alert and oriented X3, mood and affect are appropriate  Skin: Without rashes (10 Jama 2020 10:04)          PAST MEDICAL & SURGICAL HISTORY:  Statin intolerance: elevated liver enzymes  Carotid disease, bilateral: mild  HLD (hyperlipidemia)  HTN (hypertension)  Non-compliance: with medical care  AP (angina pectoris)  Obesity  H/O elbow surgery: right  S/p nephrectomy: left side for donor status          MEDICATIONS  (STANDING):  aspirin enteric coated 325 milliGRAM(s) Oral daily  cefuroxime  IVPB 1500 milliGRAM(s) IV Intermittent every 8 hours  dextrose 50% Injectable 12.5 Gram(s) IV Push once  dextrose 50% Injectable 25 Gram(s) IV Push once  dextrose 50% Injectable 25 Gram(s) IV Push once  enoxaparin Injectable 40 milliGRAM(s) SubCutaneous daily  insulin glargine Injectable (LANTUS) 28 Unit(s) SubCutaneous every morning  insulin lispro (HumaLOG) corrective regimen sliding scale   SubCutaneous Before meals and at bedtime  insulin lispro Injectable (HumaLOG) 7 Unit(s) SubCutaneous three times a day before meals  lidocaine   Patch 2 Patch Transdermal daily  metoprolol tartrate 25 milliGRAM(s) Oral two times a day  ondansetron Injectable 4 milliGRAM(s) IV Push once  pantoprazole    Tablet 40 milliGRAM(s) Oral daily  polyethylene glycol 3350 17 Gram(s) Oral daily  senna 2 Tablet(s) Oral at bedtime  vancomycin  IVPB 1000 milliGRAM(s) IV Intermittent every 12 hours    MEDICATIONS  (PRN):  oxycodone    5 mG/acetaminophen 325 mG 1 Tablet(s) Oral every 4 hours PRN Moderate Pain (4 - 6)  oxycodone    5 mG/acetaminophen 325 mG 2 Tablet(s) Oral every 4 hours PRN Severe Pain (7 - 10)          Allergies    No Known Allergies    Intolerances    Lipitor (Hepatotoxicity)  ohs (Unknown)        WEIGHTS:  Daily     Daily Weight in k.4 (15 Jama 2020 06:00)  Admit Wt: Drug Dosing Weight  Height (cm): 170 (2020 04:31)  Weight (kg): 87.1 (2020 04:31)  BMI (kg/m2): 30.1 (2020 04:31)  BSA (m2): 1.99 (2020 04:31)  I&O's Summary    2020 07:01  -  15 Jama 2020 07:00  --------------------------------------------------------  IN: 2526.5 mL / OUT: 4185 mL / NET: -1658.5 mL    15 Jama 2020 07:01  -  2020 00:42  --------------------------------------------------------  IN: 1041 mL / OUT: 1080 mL / NET: -39 mL        VITAL SIGNS:  ICU Vital Signs Last 24 Hrs  T(C): 36.8 (15 Jama 2020 16:00), Max: 37.2 (15 Jama 2020 01:00)  T(F): 98.2 (15 Jama 2020 16:00), Max: 99 (15 Jama 2020 01:00)  HR: 78 (2020 00:00) (75 - 85)  BP: 93/55 (2020 00:00) (89/50 - 114/66)  BP(mean): 67 (2020 00:00) (65 - 85)  ABP: 108/50 (15 Jama 2020 13:00) (102/52 - 121/59)  ABP(mean): 66 (15 Jama 2020 13:00) (66 - 79)  RR: 22 (2020 00:00) (16 - 28)  SpO2: 94% (2020 00:00) (94% - 100%)        All laboratory results, radiology and medications reviewed.    LABS:  01-15    138  |  105  |  15.0  ----------------------------<  122<H>  4.2   |  22.0  |  1.24    Ca    8.3<L>      15 Jama 2020 03:03  Mg     2.3     15    TPro  5.9<L>  /  Alb  4.2  /  TBili  0.8  /  DBili  x   /  AST  49<H>  /  ALT  41<H>  /  AlkPhos  53  -14                                 9.5    11.78 )-----------( 181      ( 15 Jama 2020 03:03 )             28.6          PT/INR - ( 15 Jama 2020 03:03 )   PT: 14.4 sec;   INR: 1.24 ratio         PTT - ( 15 Jama 2020 03:03 )  PTT:28.2 sec    ABG - ( 2020 18:36 )  pH, Arterial: 7.40  pH, Blood: x     /  pCO2: 42    /  pO2: 225   / HCO3: 25    / Base Excess: 0.8   /  SaO2: 100                   CAPILLARY BLOOD GLUCOSE      POCT Blood Glucose.: 105 mg/dL (15 Jama 2020 22:28)  POCT Blood Glucose.: 183 mg/dL (15 Jama 2020 17:16)  POCT Blood Glucose.: 113 mg/dL (15 Jama 2020 13:18)  POCT Blood Glucose.: 127 mg/dL (15 Jama 2020 11:10)  POCT Blood Glucose.: 158 mg/dL (15 Jama 2020 10:03)  POCT Blood Glucose.: 136 mg/dL (15 Jama 2020 08:10)  POCT Blood Glucose.: 139 mg/dL (15 Jama 2020 06:23)  POCT Blood Glucose.: 130 mg/dL (15 Jama 2020 04:19)  POCT Blood Glucose.: 126 mg/dL (15 Jama 2020 02:19)             PHYSICAL EXAM:  General:  well nourished, no acute distress  Neurology:  alert and oriented X 4, nonfocal, no gross deficits  Respiratory:  clear to auscultation bilaterally  CV:  regular rate and rhythm, normal S1 S2  Abdomen:  soft, nontender, nondistended, positive bowel sounds  Extremities:  warm, well perfused, no edema +DP pulses  Incisions:  midline sternal incision, c/d/i, sternum stable

## 2020-01-16 NOTE — PROGRESS NOTE ADULT - SUBJECTIVE AND OBJECTIVE BOX
[facility], [facility]                                                    [address], [address], [place] [address]                                                         PHONE: [phone]    FAX: [phone] [address], [address], [place] [address]                                                 PHONE: [phone]    FAX: [phone]  *******************************************************************************  cc: s/p CABG d2    HPI: 53 y/o male who presented with recent onset of intermittent chest pain with exertion, and chest heaviness resolving with rest without radiation. No SOB. Pt went to his PCP whom he had not seen x10 years who then referred him to cardiologist.    States daily compliance with aspirin 81 +FH CAD premature, HTN, HLD, non compliance, angina, former smoker/s/p nephrectomy for donor status. Cardiac catheterization with severe CAD now s/p CABG 1/14      Overnight events/Subjective Assessment: no chest pain. Using incentive spirometer. Still with fatigue with incentive spirometry use. No SOB. No palpitations. Denies orthopnea or PND. Mildly edematous    INTERPRETATION OF TELEMETRY (personally reviewed): SR    PAST MEDICAL & SURGICAL HISTORY:  Statin intolerance: elevated liver enzymes  Carotid disease, bilateral: mild  HLD (hyperlipidemia)  HTN (hypertension)  Non-compliance: with medical care  AP (angina pectoris)  Obesity  H/O elbow surgery: right  S/p nephrectomy: left side for donor status    Allergies:  Lipitor (Hepatotoxicity)  No Known Allergies  ohs (Unknown)      MEDICATIONS  (STANDING):  aspirin enteric coated 325 milliGRAM(s) Oral daily  cefuroxime  IVPB 1500 milliGRAM(s) IV Intermittent every 8 hours  dextrose 50% Injectable 12.5 Gram(s) IV Push once  dextrose 50% Injectable 25 Gram(s) IV Push once  dextrose 50% Injectable 25 Gram(s) IV Push once  enoxaparin Injectable 40 milliGRAM(s) SubCutaneous daily  insulin glargine Injectable (LANTUS) 28 Unit(s) SubCutaneous every morning  insulin lispro (HumaLOG) corrective regimen sliding scale   SubCutaneous Before meals and at bedtime  insulin lispro Injectable (HumaLOG) 7 Unit(s) SubCutaneous three times a day before meals  lidocaine   Patch 2 Patch Transdermal daily  metoprolol tartrate 25 milliGRAM(s) Oral two times a day  ondansetron Injectable 4 milliGRAM(s) IV Push once  pantoprazole    Tablet 40 milliGRAM(s) Oral daily  polyethylene glycol 3350 17 Gram(s) Oral daily  senna 2 Tablet(s) Oral at bedtime  vancomycin  IVPB 1000 milliGRAM(s) IV Intermittent every 12 hours    MEDICATIONS  (PRN):  oxycodone    5 mG/acetaminophen 325 mG 1 Tablet(s) Oral every 4 hours PRN Moderate Pain (4 - 6)  oxycodone    5 mG/acetaminophen 325 mG 2 Tablet(s) Oral every 4 hours PRN Severe Pain (7 - 10)      Vital Signs Last 24 Hrs  T(C): 37.3 (16 Jan 2020 00:30), Max: 37.3 (16 Jan 2020 00:30)  T(F): 99.1 (16 Jan 2020 00:30), Max: 99.1 (16 Jan 2020 00:30)  HR: 82 (16 Jan 2020 06:00) (75 - 85)  BP: 121/67 (16 Jan 2020 06:00) (89/50 - 121/67)  BP(mean): 87 (16 Jan 2020 06:00) (65 - 87)  RR: 18 (16 Jan 2020 06:00) (16 - 28)  SpO2: 96% (16 Jan 2020 06:00) (94% - 100%)    I&O's Detail    15 Jama 2020 07:01  -  16 Jan 2020 07:00  --------------------------------------------------------  IN:    insulin regular Infusion: 21 mL    Oral Fluid: 240 mL    sodium chloride 0.9%: 20 mL    sodium chloride 0.9%: 10 mL    Solution: 100 mL    Solution: 150 mL    Solution: 500 mL  Total IN: 1041 mL    OUT:    Chest Tube: 50 mL    Chest Tube: 60 mL    Chest Tube: 100 mL    Indwelling Catheter - Urethral: 145 mL    Voided: 1375 mL  Total OUT: 1730 mL    Total NET: -689 mL        I&O's Summary    15 Jama 2020 07:01  -  16 Jan 2020 07:00  --------------------------------------------------------  IN: 1041 mL / OUT: 1730 mL / NET: -689 mL            PHYSICAL EXAM:  General: Appears well developed, well nourished, no acute distress. not in acute pain. 2 chest tubes in place. Mild diffuse edema  HEAD: normal cephalic. Atraumatic  PUPILS: equal and reactive to light  EARS: normal hearing  NECK: supple. no JVD or HJR. no carotid bruits. no visible lymphadenopathy  NOSE: no gross abnormalities  CHEST: symmetric chest wall expansion  CARDIOVASCULAR: Normal rate. Regular rhythm. Normal S1 and S2, no S3/S4,  no murmur, rub, or gallop  LUNGS: Normal effort. Normal respiratory rate. Breath sounds are clear to auscultation bilaterally. No respiratory distress. No stridor.  no rales, rhonchi or wheeze. no decreased Breath sounds  ABDOMEN: Soft, nontender, non-distended, positive bowel sounds, no mass or bruit. no abdominal tenderness. No rebound. no ascites  EXTREMITIES: No clubbing, cyanosis or edema. normal range of motion  PULSES:  distal pulses WNL  SKIN: Warm and dry with normal turgor. no visible rash or cyanosis   NEURO: Alert & oriented x 3, grossly intact with no focal weakness  PSYCH: normal mood and affect. Grossly normal insight and judgement exhibited    FAMILY HISTORY: family hx of premature CAD      SOCIAL HISTORY: former smoking. No ETOH/No IVDA    REVIEW OF SYSTEMS:  Constitutional: no fever, chills or malaise. No weight loss  Head: no trauma  Eyes: no visual deficit. No double vision  Ears: no hearing deficit or ringing in the ears  Nose: no nose bleeds or smell changes or congestion  Throat: no difficult swallowing or painful swallowing  Neck: supple. No lymphadenopathy or swelling  Respiratory: no SOB, wheeze, asthma, COPD. No cough. No blood in the sputum  Cardiovascular: no CP, palpitations, irregular heart beats. No edema. No PND. No orthopnea. No skin/temperature or color changes  Gastrointestinal: no abdominal pain. No constipation. No diarrhea. No melena. No nausea. No vomiting. No bloating  Genitourinary: no frequency or urgency. No hematuria  Lymphatics: no grossly swollen lymph nodes  Musculoskeletal: no limitation of range of motion. Normal strength. No pain  Integumentary: no visible rash. No itching  Neurologic: no HA. No TIA or stroke symptoms. No seizure. No hx of epilepsy. No tingling or numbness. No weakness. No dizziness  Psychiatric: denied. Reports appropriate mood.        LABS:                        9.6    12.42 )-----------( 185      ( 16 Jan 2020 05:02 )             30.3     01-16    138  |  102  |  18.0  ----------------------------<  126<H>  4.0   |  22.0  |  1.23    Ca    8.4<L>      16 Jan 2020 05:02  Mg     2.3     01-16    TPro  5.9<L>  /  Alb  4.2  /  TBili  0.8  /  DBili  x   /  AST  49<H>  /  ALT  41<H>  /  AlkPhos  53  01-14        PT/INR - ( 15 Jama 2020 03:03 )   PT: 14.4 sec;   INR: 1.24 ratio         PTT - ( 15 Jama 2020 03:03 )  PTT:28.2 sec  Serum Pro-Brain Natriuretic Peptide: 130 pg/mL (01-10 @ 13:33)  serum  Lipids:   Hemoglobin A1C, Whole Blood: 8.6 % (01-10 @ 12:33)    Thyroid Stimulating Hormone, Serum: 1.12 uIU/mL (01-10 @ 13:33)      RADIOLOGY & ADDITIONAL STUDIES:    < from: Xray Chest 1 View- PORTABLE-Routine (01.15.20 @ 05:43) >  FINDINGS:   1/14/2020 available for review.        [place] catheter tip in the right ventricular outflow tract.  Mediastinal drains in place.   Left chest tube in place.    The lungs  are clear.  No pleural abnormality is seen.        The heart and mediastinum are within normal limits following cardiac  surgery.     Visualized osseous structures are intact.    < end of copied text >    < from: Cardiac Cath Lab - Adult (01.10.20 @ 11:59) >  VENTRICLES: There were no left ventricular global or regional wall motion  abnormalities. EF calculated by contrast ventriculography was 60 %.  CORONARY VESSELS: The coronary circulation is right dominant.  LM:   --  LM: Angiography showed minor luminal irregularities with no flow  limiting lesions.  LAD:   --  Mid LAD: The distal vessel was supplied by extensive collaterals  from the third obtuse marginal. There was a 100 % stenosis.  --  D1: There was a discrete 90 % stenosis at the ostium of the vessel  segment. The lesion was hazy, complex, and eccentric. There was ANDRY grade  3 flow through the vessel (brisk flow).  CX:   --  OM2: The vessel was large sized. There was a tubular 100 %  stenosis in the proximal third of the vessel segment. There was ANDRY grade  0 flow through the vessel (no flow).  RCA:   --  Proximal RCA: There was a tubular 99 % stenosis. The lesion was  hazy, complex,and eccentric. There was ANDRY grade 1 flow through the  vessel (slow flow without perfusion).  --  RPDA: The distal vessel was supplied by extensive collaterals from the  third obtuse marginal.  ARCH VESSELS: LIMA: Normal.  COMPLICATIONS: No complications occurred during the cath lab visit.  DIAGNOSTIC IMPRESSIONS: Unstable angina with minimal exertion (CCS cl III)  Severe native three vessel CAD with preserved LV function  The LIMA is widely patent    < end of copied text >    < from: TTE Echo Complete w/Doppler (01.10.20 @ 17:26) >  Summary:   1. Technically good study.   2. Hyperdynamic global left ventricular systolic function.   3. Left ventricular ejection fraction, by visual estimation, is >75%.   4. Spectral Doppler shows impaired relaxation pattern of left ventricular myocardial filling (Grade I diastolic dysfunction).   5. Mild thickening of the anterior mitral valve leaflet.   6. Trace mitral valve regurgitation.   7. Trivial pericardial effusion.    < end of copied text >      ASSESSMENT AND PLAN:  In summary, MARGOT JESUS is a 54y Male with past medical history significant for  presentation with intermittent chest pain with exertion, and chest heaviness resolving with rest without radiation. No SOB. Pt went to his PCP whom he had not seen x10 years who then referred him to cardiologist.    States daily compliance with aspirin 81 +FH CAD premature, HTN, HLD, non compliance, angina, former smoker/s/p nephrectomy for donor status. Cardiac catheterization with severe CAD, now s/p CABG 1/14/20    - Severe multivessel CAD with preserved LV function.  s/p CABG 1/14/20 (LIMA to LAD, SVG-PDA, SVG-OM, SVG-Diagonal). post op day 2. on ASA 325mg daily    - Mild diffuse edema. Consider IV lasix PRN for mobilization of post operative volume overload    - HTN. metoprolol 25mg BID. BP is controlled    - HL. eventual statin?. Hepatotoxicity with lipitor in the past. Would at least resume zetia.    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 Continue to monitor renal function.  No CHF on exam, but mild diffuse edema/fluid overload due to post operative state. May need diuretic PRN for mobilizaiton of fluid.      - Respiratory status. Encouragement with incentive spirometry offered.  The importance of taking pain medications to allow for appropriate incentive spirometry use and to allow for chest tube removal dw the pt.  The pt expresses understanding and will attempt to improve his use of the incentive spirometer.    - Hemodynamics stable.    - Telemetry monitoring personally reviewed by me. SR    - radiologic imaging reviewed    - Laboratory data reviewed.    - I spoke with nursing at the bedside.     - Pt currently in chair. Mobilize as tolerated    - Chest tube removal as allowed by CTS    - I have personally reviewed all obtainable prior records and data        Kiarra Salazar MD

## 2020-01-16 NOTE — PROGRESS NOTE ADULT - SUBJECTIVE AND OBJECTIVE BOX
[facility] DIVISION OF KIDNEY DISEASES AND HYPERTENSION -- FOLLOW UP NOTE  --------------------------------------------------------------------------------  Chief Complaint: AUBREE    24 hour events/subjective:        PAST HISTORY  --------------------------------------------------------------------------------  No significant changes to PMH, PSH, FHx, SHx, unless otherwise noted    ALLERGIES & MEDICATIONS  --------------------------------------------------------------------------------  Allergies  No Known Allergies    Intolerances  Lipitor (Hepatotoxicity)  ohs (Unknown)    Standing Inpatient Medications  aspirin enteric coated 325 milliGRAM(s) Oral daily  dextrose 50% Injectable 12.5 Gram(s) IV Push once  dextrose 50% Injectable 25 Gram(s) IV Push once  dextrose 50% Injectable 25 Gram(s) IV Push once  enoxaparin Injectable 40 milliGRAM(s) SubCutaneous daily  insulin glargine Injectable (LANTUS) 28 Unit(s) SubCutaneous every morning  insulin lispro (HumaLOG) corrective regimen sliding scale   SubCutaneous Before meals and at bedtime  insulin lispro Injectable (HumaLOG) 7 Unit(s) SubCutaneous three times a day before meals  lidocaine   Patch 2 Patch Transdermal daily  metoprolol tartrate 25 milliGRAM(s) Oral two times a day  ondansetron Injectable 4 milliGRAM(s) IV Push once  pantoprazole    Tablet 40 milliGRAM(s) Oral daily  polyethylene glycol 3350 17 Gram(s) Oral daily  senna 2 Tablet(s) Oral at bedtime  vancomycin  IVPB 1000 milliGRAM(s) IV Intermittent every 12 hours    PRN Inpatient Medications  oxycodone    5 mG/acetaminophen 325 mG 1 Tablet(s) Oral every 4 hours PRN  oxycodone    5 mG/acetaminophen 325 mG 2 Tablet(s) Oral every 4 hours PRN      REVIEW OF SYSTEMS  --------------------------------------------------------------------------------  Gen: No weight changes, fatigue, fevers/chills, weakness  Skin: No rashes  Head/Eyes/Ears/Mouth: No headache; Normal hearing; Normal vision w/o blurriness  Respiratory: No dyspnea, cough, wheezing, hemoptysis  CV: No chest pain, PND, orthopnea  GI: No abdominal pain, diarrhea, constipation, nausea, vomiting, melena, hematochezia  : No increased frequency, dysuria, hematuria, nocturia  MSK: No joint pain/swelling; no back pain; no edema  Neuro: No dizziness/lightheadedness, weakness, seizures, numbness, tingling  Heme: No easy bruising or bleeding  Endo: No heat/cold intolerance  Psych: No significant nervousness, anxiety, stress, depression    All other systems were reviewed and are negative, except as noted.    VITALS/PHYSICAL EXAM  --------------------------------------------------------------------------------  T(C): 36.8 (01-16-20 @ 08:43), Max: 37.3 (01-16-20 @ 00:30)  HR: 79 (01-16-20 @ 08:00) (75 - 85)  BP: 91/56 (01-16-20 @ 08:00) (89/50 - 121/67)  RR: 22 (01-16-20 @ 08:00) (16 - 28)  SpO2: 96% (01-16-20 @ 08:00) (94% - 100%)      01-15-20 @ 07:01  -  01-16-20 @ 07:00  --------------------------------------------------------  IN: 1041 mL / OUT: 1730 mL / NET: -689 mL    01-16-20 @ 07:01  -  01-16-20 @ 08:45  --------------------------------------------------------  IN: 50 mL / OUT: 0 mL / NET: 50 mL      Physical Exam:  	Gen: NAD, well-appearing, OOB in chair  	HEENT:  supple neck  	Pulm: CTA B/L  	CV: RRR, S1S2; no rub  	Back: No spinal or CVA tenderness; no sacral edema  	Abd: +BS, soft, nontender/nondistended  	: No suprapubic tenderness  	UE: Warm, no edema; no asterixis  	LE: Warm, no edema  	Neuro: No focal deficits  	Psych: Normal affect and mood  	Skin: Warm, without rashes  	Vascular access: none    LABS/STUDIES  --------------------------------------------------------------------------------              9.6    12.42 >-----------<  185      [01-16-20 @ 05:02]              30.3     138  |  102  |  18.0  ----------------------------<  126      [01-16-20 @ 05:02]  4.0   |  22.0  |  1.23        Ca     8.4     [01-16-20 @ 05:02]      Mg     2.3     [01-16-20 @ 05:02]    TPro  5.9  /  Alb  4.2  /  TBili  0.8  /  DBili  x   /  AST  49  /  ALT  41  /  AlkPhos  53  [01-14-20 @ 14:27]    PT/INR: PT 14.4 , INR 1.24       [01-15-20 @ 03:03]  PTT: 28.2       [01-15-20 @ 03:03]    Creatinine Trend:  SCr 1.23 [01-16 @ 05:02]  SCr 1.24 [01-15 @ 03:03]  SCr 1.04 [01-14 @ 14:27]  SCr 1.41 [01-14 @ 02:38]  SCr 1.25 [01-13 @ 11:18]    Urinalysis - [01-11-20 @ 18:30]      Color Yellow / Appearance Clear / SG 1.020 / pH 6.0      Gluc 250 / Ketone Trace  / Bili Negative / Urobili Negative       Blood Trace / Protein Negative / Leuk Est Negative / Nitrite Negative      RBC 0-2 / WBC 0-2 / Hyaline  / Gran  / Sq Epi  / Non Sq Epi Occasional / Bacteria Occasional    Urine Creatinine 117      [01-14-20 @ 07:36]  Urine Protein 8.0      [01-14-20 @ 07:36]  Urine Sodium 112      [01-14-20 @ 07:36]    HbA1c 8.6      [01-10-20 @ 12:33]  TSH 1.12      [01-10-20 @ 13:33]  Lipid: chol 431, , HDL 33,       [01-10-20 @ 10:50]    HCV 0.06, Nonreact      [01-10-20 @ 22:04]  HIV Nonreact      [01-10-20 @ 10:50]

## 2020-01-16 NOTE — PROGRESS NOTE ADULT - PROBLEM SELECTOR PLAN 7
1.2cm left thyroid nodule incidentally seen on carotid US.   Plan for dedicated thyroid US and follow up as an outpatient.

## 2020-01-16 NOTE — CHART NOTE - NSCHARTNOTEFT_GEN_A_CORE
CTS Transfer Acceptance Note to 4 CTU from CTICU    54M h/o HTN, HLD, donor nephrectomy with poor medical compliance and a new diagnosis of diabetes (A1C 8.6) admitted for catheterization with chest pain on 1/10 and was found to have 4 vessel severe CAD with a normal/ hyperdynamic ejection fraction. Plan initially to do radial artery harvest, however, patient found to have incomplete palmar arches, preventing harvest of radial arteries. Patient also had an elevated creatinine preop likely due to IV contrast. Patient admitted to ICU for IV hydration and ultimately went to the OR on 1/14 for a C4L. Postop course notable for hyperglycemic requiring an insulin drip and now transitioned to Lantus/premeal humalog. Patient also had mild orthostasis with feeling sweating upon standing. Patient noted to have mild hypotension (96/55) > will perform orthostatics to assess volume status. Consider reducing Lopressor dose. Creatinine stable at 1.23. Left pleural chest tube removed and pericardial drain left for increased drainage. Patient also attached to pacing box on VVI backup. Patient ambulating in room, without complaint. States " I know I have to work hard to open up my lungs" Patient may have slight atelectasis left base on CXR, low grade temp this morning 99.1 as well as mild elevation in WBC. Patient not on a statin as he had several times he was notified by PCP to stop statins, questionably due to lab abnormalities. Dr Salazar to weigh in on statin requirement. Total cholesterol is 431, highlighting need for intensive cardiology follow up for secondary cardiac prevention and cholesterol management. Will continue to monitor closely.

## 2020-01-16 NOTE — PROGRESS NOTE ADULT - SUBJECTIVE AND OBJECTIVE BOX
INTERVAL HPI/OVERNIGHT EVENTS:  follwo up newly Dx T2DM     Pt post op CABG    pt reports to be feeling a little better       MEDICATIONS  (STANDING):  aspirin enteric coated 325 milliGRAM(s) Oral daily  dextrose 50% Injectable 12.5 Gram(s) IV Push once  dextrose 50% Injectable 25 Gram(s) IV Push once  dextrose 50% Injectable 25 Gram(s) IV Push once  enoxaparin Injectable 40 milliGRAM(s) SubCutaneous daily  insulin lispro (HumaLOG) corrective regimen sliding scale   SubCutaneous Before meals and at bedtime  insulin lispro Injectable (HumaLOG) 7 Unit(s) SubCutaneous three times a day before meals  lidocaine   Patch 2 Patch Transdermal daily  metoprolol tartrate 25 milliGRAM(s) Oral two times a day  ondansetron Injectable 4 milliGRAM(s) IV Push once  pantoprazole    Tablet 40 milliGRAM(s) Oral daily  polyethylene glycol 3350 17 Gram(s) Oral daily  senna 2 Tablet(s) Oral at bedtime    MEDICATIONS  (PRN):  oxycodone    5 mG/acetaminophen 325 mG 1 Tablet(s) Oral every 4 hours PRN Moderate Pain (4 - 6)  oxycodone    5 mG/acetaminophen 325 mG 2 Tablet(s) Oral every 4 hours PRN Severe Pain (7 - 10)      Allergies    No Known Allergies    Intolerances    Lipitor (Hepatotoxicity)  ohs (Unknown)      Review of systems:  NO P no pressure nO N/v or abd pain   Vital Signs Last 24 Hrs  T(C): 36.8 (16 Jan 2020 15:26), Max: 37.3 (16 Jan 2020 00:30)  T(F): 98.2 (16 Jan 2020 15:26), Max: 99.1 (16 Jan 2020 00:30)  HR: 95 (16 Jan 2020 17:45) (78 - 95)  BP: 122/77 (16 Jan 2020 17:45) (91/56 - 122/77)  BP(mean): 70 (16 Jan 2020 10:45) (67 - 87)  RR: 18 (16 Jan 2020 15:26) (16 - 24)  SpO2: 98% (16 Jan 2020 15:26) (94% - 98%)    PHYSICAL EXAM:    Neck: No LAD, No JVD, trachea midline, no thyroid enlargement  Respiratory: CTAB  Cardiovascular: S1 and S2, RRR, no M/G/R    Extremities: No peripheral edema, no pedal lesions    Neurological: A/O x 3, no focal deficits      LABS:                        9.6    12.42 )-----------( 185      ( 16 Jan 2020 05:02 )             30.3     01-16    138  |  102  |  18.0  ----------------------------<  126<H>  4.0   |  22.0  |  1.23    Ca    8.4<L>      16 Jan 2020 05:02  Mg     2.3     01-16            CAPILLARY BLOOD GLUCOSE    CAPILLARY BLOOD GLUCOSE      POCT Blood Glucose.: 74 mg/dL (16 Jan 2020 21:49)  POCT Blood Glucose.: 120 mg/dL (16 Jan 2020 17:11)  POCT Blood Glucose.: 146 mg/dL (16 Jan 2020 12:18)  POCT Blood Glucose.: 144 mg/dL (16 Jan 2020 07:58)  POCT Blood Glucose.: 105 mg/dL (15 Jama 2020 22:28)    RADIOLOGY & ADDITIONAL TESTS:

## 2020-01-16 NOTE — CHART NOTE - NSCHARTNOTEFT_GEN_A_CORE
Source: Patient [ x]  Family [ ]   other [ x] EMR and discussed in A.M round     Current Diet: Diet, Clear Liquid (01-14-20 @ 10:47)  Diet, DASH/TLC:   Sodium & Cholesterol Restricted  Consistent Carbohydrate {No Snacks} (01-15-20 @ 03:30)    PO intake:  < 50% [ ]   50-75%  [x ]   %  [ ]  other :    Source for PO intake [x ] Patient [ ] family [ ] chart [ ] staff [ ] other    Current Weight:   1/16: 95.3kg  1/10: 87kg     % Weight Change: 18# (Unsure of accuracy of wt's, will continue to monitor for trends) Generalized edema noted.     Pertinent Medications: MEDICATIONS  (STANDING):  aspirin enteric coated 325 milliGRAM(s) Oral daily  dextrose 50% Injectable 12.5 Gram(s) IV Push once  dextrose 50% Injectable 25 Gram(s) IV Push once  dextrose 50% Injectable 25 Gram(s) IV Push once  enoxaparin Injectable 40 milliGRAM(s) SubCutaneous daily  insulin glargine Injectable (LANTUS) 28 Unit(s) SubCutaneous every morning  insulin lispro (HumaLOG) corrective regimen sliding scale   SubCutaneous Before meals and at bedtime  insulin lispro Injectable (HumaLOG) 7 Unit(s) SubCutaneous three times a day before meals  lidocaine   Patch 2 Patch Transdermal daily  metoprolol tartrate 25 milliGRAM(s) Oral two times a day  ondansetron Injectable 4 milliGRAM(s) IV Push once  pantoprazole    Tablet 40 milliGRAM(s) Oral daily  polyethylene glycol 3350 17 Gram(s) Oral daily  senna 2 Tablet(s) Oral at bedtime  vancomycin  IVPB 1000 milliGRAM(s) IV Intermittent every 12 hours    MEDICATIONS  (PRN):  oxycodone    5 mG/acetaminophen 325 mG 1 Tablet(s) Oral every 4 hours PRN Moderate Pain (4 - 6)  oxycodone    5 mG/acetaminophen 325 mG 2 Tablet(s) Oral every 4 hours PRN Severe Pain (7 - 10)    Pertinent Labs: CBC Full  -  ( 16 Jan 2020 05:02 )  WBC Count : 12.42 K/uL  RBC Count : 3.46 M/uL  Hemoglobin : 9.6 g/dL  Hematocrit : 30.3 %  Platelet Count - Automated : 185 K/uL  Mean Cell Volume : 87.6 fl  Mean Cell Hemoglobin : 27.7 pg  Mean Cell Hemoglobin Concentration : 31.7 gm/dL  Auto Neutrophil # : x  Auto Lymphocyte # : x  Auto Monocyte # : x  Auto Eosinophil # : x  Auto Basophil # : x  Auto Neutrophil % : x  Auto Lymphocyte % : x  Auto Monocyte % : x  Auto Eosinophil % : x  Auto Basophil % : x        Skin: Midsternal incision     Nutrition focused physical exam conducted - found signs of malnutrition [x ]absent [ ]present    Subcutaneous fat loss: [ ] Orbital fat pads region, [ ]Buccal fat region, [ ]Triceps region,  [ ]Ribs region    Muscle wasting: [ ]Temples region, [ ]Clavicle region, [ ]Shoulder region, [ ]Scapula region, [ ]Interosseous region,  [ ]thigh region, [ ]Calf region    Estimated Needs:   [x ] no change since previous assessment  [ ] recalculated:     Current Nutrition Diagnosis:  Pt remains at high nutrition risk secondary to increased nutrient needs related to increased physiologic demand to promote healing as evidenced by s/p C4L. Pt awake and alert, started on Consistent CHO, DASH/TLC diet. Pt tolerating well with no complaints of difficulties chewing or swallowing. Pt reports eating very slowly secondary to getting SOB at times. Pt reports eating well at home, no recent wt changes, wt has been steady around 190#. Pt reports new Dx of DM upon admission. Provided and reviewed therapeutic diet guidelines. Pt with fair to good understanding and expected compliance. Recommendations below:     Recommendations:   1. RX: MVI and Vit. C daily (500mg)   2. Check wt daily to monitor trends  3. Monitor BGM, H/H     Monitoring and Evaluation:   [x ] PO intake [x ] Tolerance to diet prescription [X] Weights  [X] Follow up per protocol [X] Labs:

## 2020-01-17 ENCOUNTER — TRANSCRIPTION ENCOUNTER (OUTPATIENT)
Age: 55
End: 2020-01-17

## 2020-01-17 PROBLEM — I20.9 ANGINA PECTORIS, UNSPECIFIED: Chronic | Status: ACTIVE | Noted: 2020-01-10

## 2020-01-17 PROBLEM — Z91.19 PATIENT'S NONCOMPLIANCE WITH OTHER MEDICAL TREATMENT AND REGIMEN: Chronic | Status: ACTIVE | Noted: 2020-01-10

## 2020-01-17 PROBLEM — I10 ESSENTIAL (PRIMARY) HYPERTENSION: Chronic | Status: ACTIVE | Noted: 2020-01-10

## 2020-01-17 PROBLEM — Z78.9 OTHER SPECIFIED HEALTH STATUS: Chronic | Status: ACTIVE | Noted: 2020-01-10

## 2020-01-17 PROBLEM — E66.9 OBESITY, UNSPECIFIED: Chronic | Status: ACTIVE | Noted: 2020-01-10

## 2020-01-17 PROBLEM — E78.5 HYPERLIPIDEMIA, UNSPECIFIED: Chronic | Status: ACTIVE | Noted: 2020-01-10

## 2020-01-17 PROBLEM — I73.9 PERIPHERAL VASCULAR DISEASE, UNSPECIFIED: Chronic | Status: ACTIVE | Noted: 2020-01-10

## 2020-01-17 LAB
ANION GAP SERPL CALC-SCNC: 13 MMOL/L — SIGNIFICANT CHANGE UP (ref 5–17)
BUN SERPL-MCNC: 18 MG/DL — SIGNIFICANT CHANGE UP (ref 8–20)
CALCIUM SERPL-MCNC: 8.9 MG/DL — SIGNIFICANT CHANGE UP (ref 8.6–10.2)
CHLORIDE SERPL-SCNC: 102 MMOL/L — SIGNIFICANT CHANGE UP (ref 98–107)
CO2 SERPL-SCNC: 24 MMOL/L — SIGNIFICANT CHANGE UP (ref 22–29)
COLLECT DURATION TIME UR: 24 HR — SIGNIFICANT CHANGE UP
COLLECT DURATION TIME UR: 24 HR — SIGNIFICANT CHANGE UP
CREAT SERPL-MCNC: 1.1 MG/DL — SIGNIFICANT CHANGE UP (ref 0.5–1.3)
GLUCOSE BLDC GLUCOMTR-MCNC: 122 MG/DL — HIGH (ref 70–99)
GLUCOSE BLDC GLUCOMTR-MCNC: 129 MG/DL — HIGH (ref 70–99)
GLUCOSE BLDC GLUCOMTR-MCNC: 173 MG/DL — HIGH (ref 70–99)
GLUCOSE BLDC GLUCOMTR-MCNC: 90 MG/DL — SIGNIFICANT CHANGE UP (ref 70–99)
GLUCOSE SERPL-MCNC: 117 MG/DL — HIGH (ref 70–115)
HCT VFR BLD CALC: 33.1 % — LOW (ref 39–50)
HGB BLD-MCNC: 10.1 G/DL — LOW (ref 13–17)
MAGNESIUM SERPL-MCNC: 2.4 MG/DL — SIGNIFICANT CHANGE UP (ref 1.6–2.6)
MCHC RBC-ENTMCNC: 26.8 PG — LOW (ref 27–34)
MCHC RBC-ENTMCNC: 30.5 GM/DL — LOW (ref 32–36)
MCV RBC AUTO: 87.8 FL — SIGNIFICANT CHANGE UP (ref 80–100)
PLATELET # BLD AUTO: 215 K/UL — SIGNIFICANT CHANGE UP (ref 150–400)
POTASSIUM SERPL-MCNC: 4 MMOL/L — SIGNIFICANT CHANGE UP (ref 3.5–5.3)
POTASSIUM SERPL-SCNC: 4 MMOL/L — SIGNIFICANT CHANGE UP (ref 3.5–5.3)
PROT 24H UR-MRATE: 299 MG/24HR — HIGH (ref 50–100)
RBC # BLD: 3.77 M/UL — LOW (ref 4.2–5.8)
RBC # FLD: 13.7 % — SIGNIFICANT CHANGE UP (ref 10.3–14.5)
SODIUM SERPL-SCNC: 139 MMOL/L — SIGNIFICANT CHANGE UP (ref 135–145)
TOTAL VOLUME - 24 HOUR: 1575 ML — SIGNIFICANT CHANGE UP
TOTAL VOLUME - 24 HOUR: 1575 ML — SIGNIFICANT CHANGE UP
URINE CREATININE CALCULATION: 1.4 G/24 HR — SIGNIFICANT CHANGE UP (ref 1–2)
URINE CREATININE CALCULATION: 1.4 G/24 HR — SIGNIFICANT CHANGE UP (ref 1–2)
WBC # BLD: 10.79 K/UL — HIGH (ref 3.8–10.5)
WBC # FLD AUTO: 10.79 K/UL — HIGH (ref 3.8–10.5)

## 2020-01-17 PROCEDURE — 71045 X-RAY EXAM CHEST 1 VIEW: CPT | Mod: 26,77

## 2020-01-17 PROCEDURE — 71045 X-RAY EXAM CHEST 1 VIEW: CPT | Mod: 26

## 2020-01-17 PROCEDURE — 99233 SBSQ HOSP IP/OBS HIGH 50: CPT

## 2020-01-17 RX ORDER — FUROSEMIDE 40 MG
20 TABLET ORAL ONCE
Refills: 0 | Status: COMPLETED | OUTPATIENT
Start: 2020-01-17 | End: 2020-01-17

## 2020-01-17 RX ADMIN — LIDOCAINE 2 PATCH: 4 CREAM TOPICAL at 21:00

## 2020-01-17 RX ADMIN — Medication 7 UNIT(S): at 17:43

## 2020-01-17 RX ADMIN — PANTOPRAZOLE SODIUM 40 MILLIGRAM(S): 20 TABLET, DELAYED RELEASE ORAL at 08:31

## 2020-01-17 RX ADMIN — Medication 2: at 12:34

## 2020-01-17 RX ADMIN — LIDOCAINE 2 PATCH: 4 CREAM TOPICAL at 19:50

## 2020-01-17 RX ADMIN — OXYCODONE AND ACETAMINOPHEN 1 TABLET(S): 5; 325 TABLET ORAL at 02:19

## 2020-01-17 RX ADMIN — HUMAN INSULIN 16 UNIT(S): 100 INJECTION, SUSPENSION SUBCUTANEOUS at 08:30

## 2020-01-17 RX ADMIN — LIDOCAINE 2 PATCH: 4 CREAM TOPICAL at 00:00

## 2020-01-17 RX ADMIN — Medication 25 MILLIGRAM(S): at 06:36

## 2020-01-17 RX ADMIN — Medication 7 UNIT(S): at 08:30

## 2020-01-17 RX ADMIN — OXYCODONE AND ACETAMINOPHEN 1 TABLET(S): 5; 325 TABLET ORAL at 01:49

## 2020-01-17 RX ADMIN — INSULIN GLARGINE 28 UNIT(S): 100 INJECTION, SOLUTION SUBCUTANEOUS at 22:10

## 2020-01-17 RX ADMIN — Medication 325 MILLIGRAM(S): at 08:31

## 2020-01-17 RX ADMIN — Medication 25 MILLIGRAM(S): at 17:43

## 2020-01-17 RX ADMIN — LIDOCAINE 2 PATCH: 4 CREAM TOPICAL at 08:31

## 2020-01-17 RX ADMIN — Medication 7 UNIT(S): at 12:34

## 2020-01-17 RX ADMIN — Medication 20 MILLIGRAM(S): at 12:33

## 2020-01-17 RX ADMIN — ENOXAPARIN SODIUM 40 MILLIGRAM(S): 100 INJECTION SUBCUTANEOUS at 22:10

## 2020-01-17 NOTE — PROGRESS NOTE ADULT - SUBJECTIVE AND OBJECTIVE BOX
Vital Signs Last 24 Hrs,    T(C): 36.9 (2020 09:10), Max: 37.1 (2020 22:00)  T(F): 98.4 (2020 09:10), Max: 98.7 (2020 22:00)  HR: 91 (2020 09:40) (83 - 95)  BP: 124/70 (2020 09:40) (96/55 - 127/77)  BP(mean): 70 (2020 10:45) (70 - 70)  RR: 20 (2020 09:10) (16 - 24)  SpO2: 100% (2020 09:10) (93% - 100%)    139    |  102    |  18.0   ----------------------------<  117<H>  Ca:8.9   (2020 06:14)  4.0     |  24.0   |  1.10     eGFR if Non : 75  eGFR if : 87    TPro  5.9<L>  /  Alb  4.2    /  TBili  0.8    /  DBili  x      /  AST  49<H>  /  ALT  41<H>  /  AlkPhos  53     2020 14:27                        10.1<L>  10.79<H> )-----------( 215      ( 2020 06:14 )             33.1<L>    M.4 mg/dL     UCr:117 mg/dL P/C Ratio:0.1 Ratio   Jenn:112 mmol/L     Chief Complaint: AUBREE    24 hour events/subjective:    PAST HISTORY  --------------------------------------------------------------------------------  No significant changes to PMH, PSH, FHx, SHx, unless otherwise noted    ALLERGIES & MEDICATIONS  --------------------------------------------------------------------------------  Allergies  No Known Allergies    Intolerances  Lipitor (Hepatotoxicity)  ohs (Unknown)    Standing Inpatient Medications  aspirin enteric coated 325 milliGRAM(s) Oral daily  dextrose 50% Injectable 12.5 Gram(s) IV Push once  dextrose 50% Injectable 25 Gram(s) IV Push once  dextrose 50% Injectable 25 Gram(s) IV Push once  enoxaparin Injectable 40 milliGRAM(s) SubCutaneous daily  insulin glargine Injectable (LANTUS) 28 Unit(s) SubCutaneous every morning  insulin lispro (HumaLOG) corrective regimen sliding scale   SubCutaneous Before meals and at bedtime  insulin lispro Injectable (HumaLOG) 7 Unit(s) SubCutaneous three times a day before meals  lidocaine   Patch 2 Patch Transdermal daily  metoprolol tartrate 25 milliGRAM(s) Oral two times a day  ondansetron Injectable 4 milliGRAM(s) IV Push once  pantoprazole    Tablet 40 milliGRAM(s) Oral daily  polyethylene glycol 3350 17 Gram(s) Oral daily  senna 2 Tablet(s) Oral at bedtime  vancomycin  IVPB 1000 milliGRAM(s) IV Intermittent every 12 hours    PRN Inpatient Medications  oxycodone    5 mG/acetaminophen 325 mG 1 Tablet(s) Oral every 4 hours PRN  oxycodone    5 mG/acetaminophen 325 mG 2 Tablet(s) Oral every 4 hours PRN    REVIEW OF SYSTEMS  --------------------------------------------------------------------------------  Gen: No weight changes, fatigue, fevers/chills, weakness  Skin: No rashes  Head/Eyes/Ears/Mouth: No headache; Normal hearing; Normal vision w/o blurriness  Respiratory: No dyspnea, cough, wheezing, hemoptysis  CV: No chest pain, PND, orthopnea  GI: No abdominal pain, diarrhea, constipation, nausea, vomiting, melena, hematochezia  : No increased frequency, dysuria, hematuria, nocturia  MSK: No joint pain/swelling; no back pain; no edema  Neuro: No dizziness/lightheadedness, weakness, seizures, numbness, tingling  Heme: No easy bruising or bleeding  Endo: No heat/cold intolerance  Psych: No significant nervousness, anxiety, stress, depression    All other systems were reviewed and are negative, except as noted.    VITALS/PHYSICAL EXAM  --------------------------------------------------------------------------------  T(C): 36.8 (20 @ 08:43), Max: 37.3 (20 @ 00:30)  HR: 79 (20 @ 08:00) (75 - 85)  BP: 91/56 (20 @ 08:00) (89/50 - 121/67)  RR: 22 (20 @ 08:00) (16 - 28)  SpO2: 96% (20 @ 08:00) (94% - 100%)    01-15-20 @ 07:  -  20 @ 07:00  --------------------------------------------------------  IN: 1041 mL / OUT: 1730 mL / NET: -689 mL    20 @ 07:01  -  20 @ 08:45  --------------------------------------------------------  IN: 50 mL / OUT: 0 mL / NET: 50 mL    Physical Exam:  	Gen: NAD, well-appearing, OOB in chair  	HEENT:  supple neck  	Pulm: CTA B/L  	CV: RRR, S1S2; no rub  	Back: No spinal or CVA tenderness; no sacral edema  	Abd: +BS, soft, nontender/nondistended  	: No suprapubic tenderness  	UE: Warm, no edema; no asterixis  	LE: Warm, no edema  	Neuro: No focal deficits  	Psych: Normal affect and mood  	Skin: Warm, without rashes  	Vascular access: none    LABS/STUDIES  --------------------------------------------------------------------------------              9.6    12.42 >-----------<  185      [20 05:02]              30.3     138  |  102  |  18.0  ----------------------------<  126      [20 05:02]  4.0   |  22.0  |  1.23        Ca     8.4     [20 05:02]      Mg     2.3     [20 05:02]    TPro  5.9  /  Alb  4.2  /  TBili  0.8  /  DBili  x   /  AST  49  /  ALT  41  /  AlkPhos  53  [20 14:27]    PT/INR: PT 14.4 , INR 1.24       [01-15-20 @ 03:03]  PTT: 28.2       [01-15-20 @ 03:03]    Creatinine Trend:  SCr 1.23 [ 05:02]  SCr 1.24 [01-15 @ 03:03]  SCr 1.04 [:27]  SCr 1.41 [ 02:38]  SCr 1.25 [ 11:18]    Urinalysis - [20 18:30]      Color Yellow / Appearance Clear / SG 1.020 / pH 6.0      Gluc 250 / Ketone Trace  / Bili Negative / Urobili Negative       Blood Trace / Protein Negative / Leuk Est Negative / Nitrite Negative      RBC 0-2 / WBC 0-2 / Hyaline  / Gran  / Sq Epi  / Non Sq Epi Occasional / Bacteria Occasional    Urine Creatinine 117      [20 @ 07:36]  Urine Protein 8.0      [20 @ 07:36]  Urine Sodium 112      [20 @ 07:36]    HbA1c 8.6      [01-10-20 @ 12:33]  TSH 1.12      [01-10-20 @ 13:33]  Lipid: chol 431, , HDL 33,       [01-10-20 @ 10:50]    HCV 0.06, Nonreact      [01-10-20 @ 22:04]  HIV Nonreact      [01-10-20 @ 10:50]                       54y Male with past medical history significant for presentation with intermittent chest pain with exertion, and chest heaviness resolving with rest without radiation. No SOB. Pt went to his PCP whom he had not seen x10 years who then referred him to cardiologist.    States daily compliance with aspirin 81 +FH CAD premature, HTN, HLD, non compliance, angina, former smoker/s/p nephrectomy for donor status. Cardiac catheterization with severe CAD, now s/p CABG 20    - Severe multivessel CAD with preserved LV function.  s/p CABG 20 (LIMA to LAD, SVG - PDA, SVG- OM, SVG- Diagonal).     - HTN. metoprolol 25mg BID. 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 Continue to monitor  Scr., & eGFR. I's>O's  - 24-hour urine, protein/creatinine clearance ordered  - Hemodynamics stable,    IV Lasix X 1 Dose,     Op F.U When discharged, D/W The  PA ( CTS )

## 2020-01-17 NOTE — PROGRESS NOTE ADULT - SUBJECTIVE AND OBJECTIVE BOX
[facility], [facility]                                                    [address], [address], [place] [address]                                                         PHONE: [phone]    FAX: [phone] [address], [address], [place] [address]                                                 PHONE: [phone]    FAX: [phone]  *******************************************************************************  cc: s/p CABG d3    HPI: 55 y/o male who presented with recent onset of intermittent chest pain with exertion, and chest heaviness resolving with rest without radiation. No SOB. Pt went to his PCP whom he had not seen x10 years who then referred him to cardiologist.    States daily compliance with aspirin 81 +FH CAD premature, HTN, HLD, non compliance, angina, former smoker/s/p nephrectomy for donor status. Cardiac catheterization with severe CAD now s/p CABG 1/14      Overnight events/Subjective Assessment: no chest pain. Reports he has been ambulating. Using incentive spirometer. No chest pain. No SOB. No palpitations. Denies orthopnea or PND. Mildly edematous    INTERPRETATION OF TELEMETRY (personally reviewed): SR      PAST MEDICAL & SURGICAL HISTORY:  Statin intolerance: elevated liver enzymes  Carotid disease, bilateral: mild  HLD (hyperlipidemia)  HTN (hypertension)  Non-compliance: with medical care  AP (angina pectoris)  Obesity  H/O elbow surgery: right  S/p nephrectomy: left side for donor status      Lipitor (Hepatotoxicity)  No Known Allergies  ohs (Unknown)      MEDICATIONS  (STANDING):  aspirin enteric coated 325 milliGRAM(s) Oral daily  dextrose 50% Injectable 12.5 Gram(s) IV Push once  dextrose 50% Injectable 25 Gram(s) IV Push once  dextrose 50% Injectable 25 Gram(s) IV Push once  enoxaparin Injectable 40 milliGRAM(s) SubCutaneous daily  insulin glargine Injectable (LANTUS) 28 Unit(s) SubCutaneous at bedtime  insulin lispro (HumaLOG) corrective regimen sliding scale   SubCutaneous Before meals and at bedtime  insulin lispro Injectable (HumaLOG) 7 Unit(s) SubCutaneous three times a day before meals  insulin NPH human recombinant 16 Unit(s) SubCutaneous before breakfast  lidocaine   Patch 2 Patch Transdermal daily  metoprolol tartrate 25 milliGRAM(s) Oral two times a day  ondansetron Injectable 4 milliGRAM(s) IV Push once  pantoprazole    Tablet 40 milliGRAM(s) Oral daily  polyethylene glycol 3350 17 Gram(s) Oral daily  senna 2 Tablet(s) Oral at bedtime    MEDICATIONS  (PRN):  oxycodone    5 mG/acetaminophen 325 mG 1 Tablet(s) Oral every 4 hours PRN Moderate Pain (4 - 6)  oxycodone    5 mG/acetaminophen 325 mG 2 Tablet(s) Oral every 4 hours PRN Severe Pain (7 - 10)      Vital Signs Last 24 Hrs  T(C): 37 (17 Jan 2020 06:32), Max: 37.1 (16 Jan 2020 22:00)  T(F): 98.6 (17 Jan 2020 06:32), Max: 98.7 (16 Jan 2020 22:00)  HR: 83 (17 Jan 2020 06:32) (79 - 95)  BP: 113/68 (17 Jan 2020 06:32) (91/56 - 122/77)  BP(mean): 70 (16 Jan 2020 10:45) (67 - 70)  RR: 16 (17 Jan 2020 06:32) (16 - 24)  SpO2: 96% (17 Jan 2020 06:32) (93% - 98%)    I&O's Detail    16 Jan 2020 07:01  -  17 Jan 2020 07:00  --------------------------------------------------------  IN:    Oral Fluid: 740 mL    Solution: 50 mL    Solution: 250 mL  Total IN: 1040 mL    OUT:    Chest Tube: 10 mL    Chest Tube: 10 mL    Voided: 1500 mL  Total OUT: 1520 mL    Total NET: -480 mL        I&O's Summary    16 Jan 2020 07:01  -  17 Jan 2020 07:00  --------------------------------------------------------  IN: 1040 mL / OUT: 1520 mL / NET: -480 mL            PHYSICAL EXAM:  General: Appears well developed, well nourished, no acute distress. not in acute pain  HEAD: normal cephalic. Atraumatic  PUPILS: equal and reactive to light  EARS: normal hearing  NECK: supple. no JVD or HJR. no carotid bruits. no visible lymphadenopathy  NOSE: no gross abnormalities  CHEST: symmetric chest wall expansion. 1 chest tube  CARDIOVASCULAR: Normal rate. Regular rhythm. Normal S1 and S2, no S3/S4,  no murmur, rub, or gallop  LUNGS: Normal effort. Normal respiratory rate. Breath sounds are clear to auscultation bilaterally. No respiratory distress. No stridor.  no rales, rhonchi or wheeze. no decreased Breath sounds  ABDOMEN: Soft, nontender, non-distended, positive bowel sounds, no mass or bruit. no abdominal tenderness. No rebound. no ascites  EXTREMITIES: No clubbing, cyanosis. mild LE bilat edema. normal range of motion  PULSES:  distal pulses WNL  SKIN: Warm and dry with normal turgor. no visible rash or cyanosis   NEURO: Alert & oriented x 3, grossly intact with no focal weakness  PSYCH: normal mood and affect. Grossly normal insight and judgement exhibited    FAMILY HISTORY: family hx of premature CAD      SOCIAL HISTORY: former smoking. No ETOH/No IVDA    REVIEW OF SYSTEMS:  Constitutional: no fever, chills or malaise. No weight loss  Head: no trauma  Eyes: no visual deficit. No double vision  Ears: no hearing deficit or ringing in the ears  Nose: no nose bleeds or smell changes or congestion  Throat: no difficult swallowing or painful swallowing  Neck: supple. No lymphadenopathy or swelling  Respiratory: no SOB, wheeze, asthma, COPD. No cough. No blood in the sputum  Cardiovascular: no CP, palpitations, irregular heart beats. No edema. No PND. No orthopnea. No skin/temperature or color changes  Gastrointestinal: no abdominal pain. No constipation. No diarrhea. No melena. No nausea. No vomiting. No bloating  Genitourinary: no frequency or urgency. No hematuria  Lymphatics: no grossly swollen lymph nodes  Musculoskeletal: no limitation of range of motion. Normal strength. No pain  Integumentary: no visible rash. No itching  Neurologic: no HA. No TIA or stroke symptoms. No seizure. No hx of epilepsy. No tingling or numbness. No weakness. No dizziness  Psychiatric: denied. Reports appropriate mood.        LABS:                        10.1   10.79 )-----------( 215      ( 17 Jan 2020 06:14 )             33.1     01-17    139  |  102  |  18.0  ----------------------------<  117<H>  4.0   |  24.0  |  1.10    Ca    8.9      17 Jan 2020 06:14  Mg     2.4     01-17            Serum Pro-Brain Natriuretic Peptide: 130 pg/mL (01-10 @ 13:33)  serum  Lipids:   Hemoglobin A1C, Whole Blood: 8.6 % (01-10 @ 12:33)    Thyroid Stimulating Hormone, Serum: 1.12 uIU/mL (01-10 @ 13:33)      RADIOLOGY & ADDITIONAL STUDIES:    < from: Xray Chest 1 View- PORTABLE-Urgent (01.16.20 @ 10:57) >    FINDINGS AND   IMPRESSION: Status post removal of left chest tube. Small left pleural effusion. Right lung is grossly clear. Patient is status post sternotomy and CABG. Mediastinal drain in place.    < end of copied text >    < from: Cardiac Cath Lab - Adult (01.10.20 @ 11:59) >  VENTRICLES: There were no left ventricular global or regional wall motion  abnormalities. EF calculated by contrast ventriculography was 60 %.  CORONARY VESSELS: The coronary circulation is right dominant.  LM:   --  LM: Angiography showed minor luminal irregularities with no flow  limiting lesions.  LAD:   --  Mid LAD: The distal vessel was supplied by extensive collaterals  from the third obtuse marginal. There was a 100 % stenosis.  --  D1: There was a discrete 90 % stenosis at the ostium of the vessel  segment. The lesion was hazy, complex, and eccentric. There was ANDRY grade  3 flow through the vessel (brisk flow).  CX:   --  OM2: The vessel was large sized. There was a tubular 100 %  stenosis in the proximal third of the vessel segment. There was ANDRY grade  0 flow through the vessel (no flow).  RCA:   --  Proximal RCA: There was a tubular 99 % stenosis. The lesion was  hazy, complex,and eccentric. There was ANDRY grade 1 flow through the  vessel (slow flow without perfusion).  --  RPDA: The distal vessel was supplied by extensive collaterals from the  third obtuse marginal.  ARCH VESSELS: LIMA: Normal.  COMPLICATIONS: No complications occurred during the cath lab visit.  DIAGNOSTIC IMPRESSIONS: Unstable angina with minimal exertion (CCS cl III)  Severe native three vessel CAD with preserved LV function  The LIMA is widely patent    < end of copied text >    < from: TTE Echo Complete w/Doppler (01.10.20 @ 17:26) >  Summary:   1. Technically good study.   2. Hyperdynamic global left ventricular systolic function.   3. Left ventricular ejection fraction, by visual estimation, is >75%.   4. Spectral Doppler shows impaired relaxation pattern of left ventricular myocardial filling (Grade I diastolic dysfunction).   5. Mild thickening of the anterior mitral valve leaflet.   6. Trace mitral valve regurgitation.   7. Trivial pericardial effusion.    < end of copied text >        ASSESSMENT AND PLAN:  In summary, MARGOT JESUS is a 54y Male with past medical history significant for presentation with intermittent chest pain with exertion, and chest heaviness resolving with rest without radiation. No SOB. Pt went to his PCP whom he had not seen x10 years who then referred him to cardiologist.    States daily compliance with aspirin 81 +FH CAD premature, HTN, HLD, non compliance, angina, former smoker/s/p nephrectomy for donor status. Cardiac catheterization with severe CAD, now s/p CABG 1/14/20    - Severe multivessel CAD with preserved LV function.  s/p CABG 1/14/20 (LIMA to LAD, SVG-PDA, SVG-OM, SVG-Diagonal). post op day 3. on ASA 325mg daily    - Mild diffuse edema. Consider IV lasix PRN for mobilization of post operative volume overload    - HTN. metoprolol 25mg BID. BP is controlled    - HL. eventual statin?. Hepatotoxicity with lipitor in the past. Markedly abnormal lipid panel. 1/10 with chol 431, Trig 279, HDL 33,  Will resume zetia. Will need praluent as pt with intolerance of statins with hepatic toxicity documented    - Past nephrectomy due to donor status. Continue to monitor renal function.  No CHF on exam, but mild diffuse edema/fluid overload due to post operative state. May need diuretic PRN for mobilizaiton of fluid.      - Respiratory status. Encouragement with incentive spirometry offered.  Removal of remaining chest tube as per CT surgery.  The pt expresses understanding and will attempt to improve his use of the incentive spirometer. Has been ambulating    - Hemodynamics stable.    - Telemetry monitoring personally reviewed by me. SR    - radiologic imaging reviewed    - Laboratory data reviewed.    - I spoke with CTICU team. [facility] of [facility] will work on getting approval for praluent 75mg SC biweekly    - Pt currently in chair. Mobilize as tolerated    - I have personally reviewed all obtainable prior records and data      Kiarra Salazar MD [facility], [facility]                                                    [address], [address], [place] [address]                                                         PHONE: [phone]    FAX: [phone] [address], [address], [place] [address]                                                 PHONE: [phone]    FAX: [phone]  *******************************************************************************  cc: s/p CABG d3    HPI: 55 y/o male who presented with recent onset of intermittent chest pain with exertion, and chest heaviness resolving with rest without radiation. No SOB. Pt went to his PCP whom he had not seen x10 years who then referred him to cardiologist.    States daily compliance with aspirin 81 +FH CAD premature, HTN, HLD, non compliance, angina, former smoker/s/p nephrectomy for donor status. Cardiac catheterization with severe CAD now s/p CABG 1/14      Overnight events/Subjective Assessment: no chest pain. Reports he has been ambulating. Using incentive spirometer. No chest pain. No SOB. No palpitations. Denies orthopnea or PND. Mildly edematous    INTERPRETATION OF TELEMETRY (personally reviewed): SR      PAST MEDICAL & SURGICAL HISTORY:  Statin intolerance: elevated liver enzymes  Carotid disease, bilateral: mild  HLD (hyperlipidemia)  HTN (hypertension)  Non-compliance: with medical care  AP (angina pectoris)  Obesity  H/O elbow surgery: right  S/p nephrectomy: left side for donor status      Lipitor (Hepatotoxicity)  No Known Allergies  ohs (Unknown)      MEDICATIONS  (STANDING):  aspirin enteric coated 325 milliGRAM(s) Oral daily  dextrose 50% Injectable 12.5 Gram(s) IV Push once  dextrose 50% Injectable 25 Gram(s) IV Push once  dextrose 50% Injectable 25 Gram(s) IV Push once  enoxaparin Injectable 40 milliGRAM(s) SubCutaneous daily  insulin glargine Injectable (LANTUS) 28 Unit(s) SubCutaneous at bedtime  insulin lispro (HumaLOG) corrective regimen sliding scale   SubCutaneous Before meals and at bedtime  insulin lispro Injectable (HumaLOG) 7 Unit(s) SubCutaneous three times a day before meals  insulin NPH human recombinant 16 Unit(s) SubCutaneous before breakfast  lidocaine   Patch 2 Patch Transdermal daily  metoprolol tartrate 25 milliGRAM(s) Oral two times a day  ondansetron Injectable 4 milliGRAM(s) IV Push once  pantoprazole    Tablet 40 milliGRAM(s) Oral daily  polyethylene glycol 3350 17 Gram(s) Oral daily  senna 2 Tablet(s) Oral at bedtime    MEDICATIONS  (PRN):  oxycodone    5 mG/acetaminophen 325 mG 1 Tablet(s) Oral every 4 hours PRN Moderate Pain (4 - 6)  oxycodone    5 mG/acetaminophen 325 mG 2 Tablet(s) Oral every 4 hours PRN Severe Pain (7 - 10)      Vital Signs Last 24 Hrs  T(C): 37 (17 Jan 2020 06:32), Max: 37.1 (16 Jan 2020 22:00)  T(F): 98.6 (17 Jan 2020 06:32), Max: 98.7 (16 Jan 2020 22:00)  HR: 83 (17 Jan 2020 06:32) (79 - 95)  BP: 113/68 (17 Jan 2020 06:32) (91/56 - 122/77)  BP(mean): 70 (16 Jan 2020 10:45) (67 - 70)  RR: 16 (17 Jan 2020 06:32) (16 - 24)  SpO2: 96% (17 Jan 2020 06:32) (93% - 98%)    I&O's Detail    16 Jan 2020 07:01  -  17 Jan 2020 07:00  --------------------------------------------------------  IN:    Oral Fluid: 740 mL    Solution: 50 mL    Solution: 250 mL  Total IN: 1040 mL    OUT:    Chest Tube: 10 mL    Chest Tube: 10 mL    Voided: 1500 mL  Total OUT: 1520 mL    Total NET: -480 mL        I&O's Summary    16 Jan 2020 07:01  -  17 Jan 2020 07:00  --------------------------------------------------------  IN: 1040 mL / OUT: 1520 mL / NET: -480 mL            PHYSICAL EXAM:  General: Appears well developed, well nourished, no acute distress. not in acute pain  HEAD: normal cephalic. Atraumatic  PUPILS: equal and reactive to light  EARS: normal hearing  NECK: supple. no JVD or HJR. no carotid bruits. no visible lymphadenopathy  NOSE: no gross abnormalities  CHEST: symmetric chest wall expansion. 1 chest tube  CARDIOVASCULAR: Normal rate. Regular rhythm. Normal S1 and S2, no S3/S4,  no murmur, rub, or gallop  LUNGS: Normal effort. Normal respiratory rate. Breath sounds are clear to auscultation bilaterally. No respiratory distress. No stridor.  no rales, rhonchi or wheeze. no decreased Breath sounds  ABDOMEN: Soft, nontender, non-distended, positive bowel sounds, no mass or bruit. no abdominal tenderness. No rebound. no ascites  EXTREMITIES: No clubbing, cyanosis. mild LE bilat edema. normal range of motion  PULSES:  distal pulses WNL  SKIN: Warm and dry with normal turgor. no visible rash or cyanosis   NEURO: Alert & oriented x 3, grossly intact with no focal weakness  PSYCH: normal mood and affect. Grossly normal insight and judgement exhibited    FAMILY HISTORY: family hx of premature CAD      SOCIAL HISTORY: former smoking. No ETOH/No IVDA    REVIEW OF SYSTEMS:  Constitutional: no fever, chills or malaise. No weight loss  Head: no trauma  Eyes: no visual deficit. No double vision  Ears: no hearing deficit or ringing in the ears  Nose: no nose bleeds or smell changes or congestion  Throat: no difficult swallowing or painful swallowing  Neck: supple. No lymphadenopathy or swelling  Respiratory: no SOB, wheeze, asthma, COPD. No cough. No blood in the sputum  Cardiovascular: no CP, palpitations, irregular heart beats. No edema. No PND. No orthopnea. No skin/temperature or color changes  Gastrointestinal: no abdominal pain. No constipation. No diarrhea. No melena. No nausea. No vomiting. No bloating  Genitourinary: no frequency or urgency. No hematuria  Lymphatics: no grossly swollen lymph nodes  Musculoskeletal: no limitation of range of motion. Normal strength. No pain  Integumentary: no visible rash. No itching  Neurologic: no HA. No TIA or stroke symptoms. No seizure. No hx of epilepsy. No tingling or numbness. No weakness. No dizziness  Psychiatric: denied. Reports appropriate mood.        LABS:                        10.1   10.79 )-----------( 215      ( 17 Jan 2020 06:14 )             33.1     01-17    139  |  102  |  18.0  ----------------------------<  117<H>  4.0   |  24.0  |  1.10    Ca    8.9      17 Jan 2020 06:14  Mg     2.4     01-17            Serum Pro-Brain Natriuretic Peptide: 130 pg/mL (01-10 @ 13:33)  serum  Lipids:   Hemoglobin A1C, Whole Blood: 8.6 % (01-10 @ 12:33)    Thyroid Stimulating Hormone, Serum: 1.12 uIU/mL (01-10 @ 13:33)      RADIOLOGY & ADDITIONAL STUDIES:    < from: Xray Chest 1 View- PORTABLE-Urgent (01.16.20 @ 10:57) >    FINDINGS AND   IMPRESSION: Status post removal of left chest tube. Small left pleural effusion. Right lung is grossly clear. Patient is status post sternotomy and CABG. Mediastinal drain in place.    < end of copied text >    < from: Cardiac Cath Lab - Adult (01.10.20 @ 11:59) >  VENTRICLES: There were no left ventricular global or regional wall motion  abnormalities. EF calculated by contrast ventriculography was 60 %.  CORONARY VESSELS: The coronary circulation is right dominant.  LM:   --  LM: Angiography showed minor luminal irregularities with no flow  limiting lesions.  LAD:   --  Mid LAD: The distal vessel was supplied by extensive collaterals  from the third obtuse marginal. There was a 100 % stenosis.  --  D1: There was a discrete 90 % stenosis at the ostium of the vessel  segment. The lesion was hazy, complex, and eccentric. There was ANDRY grade  3 flow through the vessel (brisk flow).  CX:   --  OM2: The vessel was large sized. There was a tubular 100 %  stenosis in the proximal third of the vessel segment. There was ANDRY grade  0 flow through the vessel (no flow).  RCA:   --  Proximal RCA: There was a tubular 99 % stenosis. The lesion was  hazy, complex,and eccentric. There was ANDRY grade 1 flow through the  vessel (slow flow without perfusion).  --  RPDA: The distal vessel was supplied by extensive collaterals from the  third obtuse marginal.  ARCH VESSELS: LIMA: Normal.  COMPLICATIONS: No complications occurred during the cath lab visit.  DIAGNOSTIC IMPRESSIONS: Unstable angina with minimal exertion (CCS cl III)  Severe native three vessel CAD with preserved LV function  The LIMA is widely patent    < end of copied text >    < from: TTE Echo Complete w/Doppler (01.10.20 @ 17:26) >  Summary:   1. Technically good study.   2. Hyperdynamic global left ventricular systolic function.   3. Left ventricular ejection fraction, by visual estimation, is >75%.   4. Spectral Doppler shows impaired relaxation pattern of left ventricular myocardial filling (Grade I diastolic dysfunction).   5. Mild thickening of the anterior mitral valve leaflet.   6. Trace mitral valve regurgitation.   7. Trivial pericardial effusion.    < end of copied text >        ASSESSMENT AND PLAN:  In summary, MARGOT JESUS is a 54y Male with past medical history significant for presentation with intermittent chest pain with exertion, and chest heaviness resolving with rest without radiation. No SOB. Pt went to his PCP whom he had not seen x10 years who then referred him to cardiologist.    States daily compliance with aspirin 81 +FH CAD premature, HTN, HLD, non compliance, angina, former smoker/s/p nephrectomy for donor status. Cardiac catheterization with severe CAD, now s/p CABG 1/14/20    - Severe multivessel CAD with preserved LV function.  s/p CABG 1/14/20 (LIMA to LAD, SVG-PDA, SVG-OM, SVG-Diagonal). post op day 3. on ASA 325mg daily    - Mild diffuse edema. Consider IV lasix PRN for mobilization of post operative volume overload    - HTN. metoprolol 25mg BID. BP is controlled    - HL. eventual statin?. Hepatotoxicity with lipitor in the past. Markedly abnormal lipid panel. 1/10 with chol 431, Trig 279, HDL 33,  Will resume zetia. Will need praluent as pt with intolerance of statins with hepatic toxicity documented    - Past nephrectomy due to donor status. Continue to monitor renal function.  No CHF on exam, but mild diffuse edema/fluid overload due to post operative state. May need diuretic PRN for mobilizaiton of fluid.  I personally dw [facility] of [facility]    - Respiratory status. Encouragement with incentive spirometry offered.  Removal of remaining chest tube as per CT surgery.  The pt expresses understanding and will attempt to improve his use of the incentive spirometer. Has been ambulating    - Hemodynamics stable.    - Telemetry monitoring personally reviewed by me. SR    - radiologic imaging reviewed    - Laboratory data reviewed.    - I personally spoke with CTICU team. [facility]ar of [facility] will work on getting approval for praluent 75mg SC biweekly. Lasix x 1 for volume mobilization post op    - Pt currently in chair. Mobilize as tolerated    - I have personally reviewed all obtainable prior records and data      Kiarra Salazar MD

## 2020-01-17 NOTE — ADVANCED PRACTICE NURSE CONSULT - ASSESSMENT
went to see pt in am pt is a+ox3 c/o 0 pain. pt states he never knew he had diabetes until the surgery. pt was educated about diabetes disease process and the importance of maintaining euglycemic control in order to promote surgical wound healing. pt verbalized understanding. pt was educated about the importance of bg monitoring a new glucometer provided. also educated about ss of hypoglycemia and tx, pt verbalized understanding. went to see pt in am pt is a+ox3 c/o 0 pain. pt states he never knew he had diabetes until the surgery. pt was educated about diabetes disease process and the importance of maintaining euglycemic control in order to promote surgical wound healing. pt verbalized understanding. pt was educated about the importance of bg monitoring a new glucometer provided. also educated about ss of hypoglycemia and tx, pt verbalized understanding. pt is scheduled for fu appointment w dr. mejia on 1/27/2020 at 1400

## 2020-01-17 NOTE — DISCHARGE NOTE NURSING/CASE MANAGEMENT/SOCIAL WORK - PATIENT PORTAL LINK FT
You can access the FollowMyHealth Patient Portal offered by [facility] by registering at the following website: [email or web address]. By joining [facility]’s FollowMyHealth portal, you will also be able to view your health information using other applications (apps) compatible with our system.

## 2020-01-17 NOTE — PROGRESS NOTE ADULT - ASSESSMENT
In summary, MARGOT JESUS is a 54y Male with past medical history significant for presentation with intermittent chest pain with exertion, and chest heaviness resolving with rest without radiation. No SOB. Pt went to his PCP whom he had not seen x10 years who then referred him to cardiologist.    States daily compliance with aspirin 81 +FH CAD premature, HTN, HLD, non compliance, angina, former smoker/s/p nephrectomy for donor status. Cardiac catheterization with severe CAD, now s/p CABG 1/14/20    - Severe multivessel CAD with preserved LV function.  S/P  CABG 1/14/20 (LIMA to LAD, SVG-PDA, SVG-OM, SVG-Diagonal). post op day 3. on ASA 325mg daily    - Mild diffuse edema. Consider IV lasix PRN for mobilization of post operative volume overload    - HTN  controlled    - HLD : Hepatotoxicity with lipitor in the past. Markedly abnormal lipid panel. 1/10 with chol 431, Trig 279, HDL 33,  mg., Considering Praluent,     - Past nephrectomy due to donor status. Continue to monitor renal function.  No CHF,    - Hemodynamics stable.

## 2020-01-17 NOTE — PROGRESS NOTE ADULT - SUBJECTIVE AND OBJECTIVE BOX
Subjective:  Pt in bed NAD no issues overnight     VITAL SIGNS  T(C): 36.9 (20 @ 09:10), Max: 37.1 (20 @ 22:00)  HR: 91 (20 @ 09:40) (83 - 95)  BP: 124/70 (20 @ 09:40) (96/55 - 127/77)  RR: 20 (20 @ 09:10) (16 - 24)  SpO2: 100% (20 @ 09:10) (93% - 100%) RA           Daily     Daily Weight in k.4 (2020 05:08)  Admit Wt: Drug Dosing Weight  Height (cm): 170 (2020 04:31)  Weight (kg): 87.1 (2020 04:31)    Telemetry:   SR   LVEF:  75%    MEDICATIONS  aspirin enteric coated 325 milliGRAM(s) Oral daily  dextrose 50% Injectable 12.5 Gram(s) IV Push once  dextrose 50% Injectable 25 Gram(s) IV Push once  dextrose 50% Injectable 25 Gram(s) IV Push once  enoxaparin Injectable 40 milliGRAM(s) SubCutaneous daily  insulin glargine Injectable (LANTUS) 28 Unit(s) SubCutaneous at bedtime  insulin lispro (HumaLOG) corrective regimen sliding scale   SubCutaneous Before meals and at bedtime  insulin lispro Injectable (HumaLOG) 7 Unit(s) SubCutaneous three times a day before meals  lidocaine   Patch 2 Patch Transdermal daily  metoprolol tartrate 25 milliGRAM(s) Oral two times a day  ondansetron Injectable 4 milliGRAM(s) IV Push once  oxycodone    5 mG/acetaminophen 325 mG 1 Tablet(s) Oral every 4 hours PRN  oxycodone    5 mG/acetaminophen 325 mG 2 Tablet(s) Oral every 4 hours PRN  pantoprazole    Tablet 40 milliGRAM(s) Oral daily  polyethylene glycol 3350 17 Gram(s) Oral daily  senna 2 Tablet(s) Oral at bedtime    MEDICATIONS  (PRN):  oxycodone    5 mG/acetaminophen 325 mG 1 Tablet(s) Oral every 4 hours PRN Moderate Pain (4 - 6)  oxycodone    5 mG/acetaminophen 325 mG 2 Tablet(s) Oral every 4 hours PRN Severe Pain (7 - 10)        PHYSICAL EXAM  General: Alert Awake NAD   Respiratory:  Decreased at bases b/l   CV: RRR S1 S2   Abdomen: soft NT ND + BS + BM  Extremities:  Trace edema b/l LE   Incisions: MSI C/D/I  + prevena + silks           I&O's Detail    2020 07:01  -  2020 07:00  --------------------------------------------------------  IN:    Oral Fluid: 740 mL    Solution: 50 mL    Solution: 250 mL  Total IN: 1040 mL    OUT:    Chest Tube: 10 mL    Chest Tube: 10 mL    Voided: 1500 mL  Total OUT: 1520 mL    Total NET: -480 mL          LABS      139  |  102  |  18.0  ----------------------------<  117<H>  4.0   |  24.0  |  1.10    Ca    8.9      2020 06:14  Mg     2.4                                        10.1   10.79 )-----------( 215      ( 2020 06:14 )             33.1                     CAPILLARY BLOOD GLUCOSE      POCT Blood Glucose.: 122 mg/dL (2020 08:05)  POCT Blood Glucose.: 74 mg/dL (2020 21:49)  POCT Blood Glucose.: 120 mg/dL (2020 17:11)  POCT Blood Glucose.: 146 mg/dL (2020 12:18)           Today's CXR:  < from: Xray Chest 1 View- PORTABLE-Urgent (20 @ 10:57) >    INTERPRETATION:  Single AP view of the chest. Comparison is made to radiograph performed at 4:48 AM on the same day.    CLINICAL INFORMATION: Status post left chest tube removal    FINDINGS AND   IMPRESSION: Status post removal of left chest tube. Small left pleural effusion. Right lung is grossly clear. Patient is status post sternotomy and CABG. Mediastinal drain in place.    < end of copied text >      PAST MEDICAL & SURGICAL HISTORY:  Statin intolerance: elevated liver enzymes  Carotid disease, bilateral: mild  HLD (hyperlipidemia)  HTN (hypertension)  Non-compliance: with medical care  AP (angina pectoris)  Obesity  H/O elbow surgery: right  S/p nephrectomy: left side for donor status

## 2020-01-17 NOTE — ADVANCED PRACTICE NURSE CONSULT - RECOMMEDATIONS
continue diabetes self management education  pt to inject all inuslin doses while in the hospital using prefilled insulin syringe and rn supervision  insulin pen teaching  glucometer teaching  pls consider dc pt on basaglar and humalog insulin pen keyla needles for 4xdaily  strips and lancets for countur next ez glucometer for 4xdaily  cc- pls task pt to diabetes wellness out pt

## 2020-01-18 ENCOUNTER — TRANSCRIPTION ENCOUNTER (OUTPATIENT)
Age: 55
End: 2020-01-18

## 2020-01-18 VITALS
OXYGEN SATURATION: 100 % | DIASTOLIC BLOOD PRESSURE: 80 MMHG | RESPIRATION RATE: 18 BRPM | HEART RATE: 110 BPM | TEMPERATURE: 98 F | SYSTOLIC BLOOD PRESSURE: 123 MMHG

## 2020-01-18 LAB
ANION GAP SERPL CALC-SCNC: 16 MMOL/L — SIGNIFICANT CHANGE UP (ref 5–17)
BUN SERPL-MCNC: 15 MG/DL — SIGNIFICANT CHANGE UP (ref 8–20)
CALCIUM SERPL-MCNC: 9 MG/DL — SIGNIFICANT CHANGE UP (ref 8.6–10.2)
CHLORIDE SERPL-SCNC: 101 MMOL/L — SIGNIFICANT CHANGE UP (ref 98–107)
CO2 SERPL-SCNC: 25 MMOL/L — SIGNIFICANT CHANGE UP (ref 22–29)
CREAT SERPL-MCNC: 1.11 MG/DL — SIGNIFICANT CHANGE UP (ref 0.5–1.3)
GLUCOSE BLDC GLUCOMTR-MCNC: 113 MG/DL — HIGH (ref 70–99)
GLUCOSE BLDC GLUCOMTR-MCNC: 116 MG/DL — HIGH (ref 70–99)
GLUCOSE SERPL-MCNC: 104 MG/DL — SIGNIFICANT CHANGE UP (ref 70–115)
HCT VFR BLD CALC: 35 % — LOW (ref 39–50)
HGB BLD-MCNC: 10.6 G/DL — LOW (ref 13–17)
MAGNESIUM SERPL-MCNC: 2.3 MG/DL — SIGNIFICANT CHANGE UP (ref 1.6–2.6)
MCHC RBC-ENTMCNC: 26.6 PG — LOW (ref 27–34)
MCHC RBC-ENTMCNC: 30.3 GM/DL — LOW (ref 32–36)
MCV RBC AUTO: 87.7 FL — SIGNIFICANT CHANGE UP (ref 80–100)
PHOSPHATE SERPL-MCNC: 2.3 MG/DL — LOW (ref 2.4–4.7)
PLATELET # BLD AUTO: 288 K/UL — SIGNIFICANT CHANGE UP (ref 150–400)
POTASSIUM SERPL-MCNC: 3.6 MMOL/L — SIGNIFICANT CHANGE UP (ref 3.5–5.3)
POTASSIUM SERPL-SCNC: 3.6 MMOL/L — SIGNIFICANT CHANGE UP (ref 3.5–5.3)
RBC # BLD: 3.99 M/UL — LOW (ref 4.2–5.8)
RBC # FLD: 13.4 % — SIGNIFICANT CHANGE UP (ref 10.3–14.5)
SODIUM SERPL-SCNC: 142 MMOL/L — SIGNIFICANT CHANGE UP (ref 135–145)
WBC # BLD: 8.5 K/UL — SIGNIFICANT CHANGE UP (ref 3.8–10.5)
WBC # FLD AUTO: 8.5 K/UL — SIGNIFICANT CHANGE UP (ref 3.8–10.5)

## 2020-01-18 PROCEDURE — C1894: CPT

## 2020-01-18 PROCEDURE — C1769: CPT

## 2020-01-18 PROCEDURE — 85027 COMPLETE CBC AUTOMATED: CPT

## 2020-01-18 PROCEDURE — 83605 ASSAY OF LACTIC ACID: CPT

## 2020-01-18 PROCEDURE — 82570 ASSAY OF URINE CREATININE: CPT

## 2020-01-18 PROCEDURE — 84480 ASSAY TRIIODOTHYRONINE (T3): CPT

## 2020-01-18 PROCEDURE — 86803 HEPATITIS C AB TEST: CPT

## 2020-01-18 PROCEDURE — 82248 BILIRUBIN DIRECT: CPT

## 2020-01-18 PROCEDURE — C1887: CPT

## 2020-01-18 PROCEDURE — 99152 MOD SED SAME PHYS/QHP 5/>YRS: CPT

## 2020-01-18 PROCEDURE — 84132 ASSAY OF SERUM POTASSIUM: CPT

## 2020-01-18 PROCEDURE — 83880 ASSAY OF NATRIURETIC PEPTIDE: CPT

## 2020-01-18 PROCEDURE — 94002 VENT MGMT INPAT INIT DAY: CPT

## 2020-01-18 PROCEDURE — 83735 ASSAY OF MAGNESIUM: CPT

## 2020-01-18 PROCEDURE — 86850 RBC ANTIBODY SCREEN: CPT

## 2020-01-18 PROCEDURE — 86923 COMPATIBILITY TEST ELECTRIC: CPT

## 2020-01-18 PROCEDURE — 85576 BLOOD PLATELET AGGREGATION: CPT

## 2020-01-18 PROCEDURE — 86901 BLOOD TYPING SEROLOGIC RH(D): CPT

## 2020-01-18 PROCEDURE — 83036 HEMOGLOBIN GLYCOSYLATED A1C: CPT

## 2020-01-18 PROCEDURE — 84436 ASSAY OF TOTAL THYROXINE: CPT

## 2020-01-18 PROCEDURE — 85610 PROTHROMBIN TIME: CPT

## 2020-01-18 PROCEDURE — 93880 EXTRACRANIAL BILAT STUDY: CPT

## 2020-01-18 PROCEDURE — 76775 US EXAM ABDO BACK WALL LIM: CPT

## 2020-01-18 PROCEDURE — 87640 STAPH A DNA AMP PROBE: CPT

## 2020-01-18 PROCEDURE — 86900 BLOOD TYPING SEROLOGIC ABO: CPT

## 2020-01-18 PROCEDURE — 97163 PT EVAL HIGH COMPLEX 45 MIN: CPT

## 2020-01-18 PROCEDURE — 84295 ASSAY OF SERUM SODIUM: CPT

## 2020-01-18 PROCEDURE — 84156 ASSAY OF PROTEIN URINE: CPT

## 2020-01-18 PROCEDURE — 84443 ASSAY THYROID STIM HORMONE: CPT

## 2020-01-18 PROCEDURE — 36415 COLL VENOUS BLD VENIPUNCTURE: CPT

## 2020-01-18 PROCEDURE — 84300 ASSAY OF URINE SODIUM: CPT

## 2020-01-18 PROCEDURE — 82803 BLOOD GASES ANY COMBINATION: CPT

## 2020-01-18 PROCEDURE — 99233 SBSQ HOSP IP/OBS HIGH 50: CPT

## 2020-01-18 PROCEDURE — 84134 ASSAY OF PREALBUMIN: CPT

## 2020-01-18 PROCEDURE — 85014 HEMATOCRIT: CPT

## 2020-01-18 PROCEDURE — 80048 BASIC METABOLIC PNL TOTAL CA: CPT

## 2020-01-18 PROCEDURE — 82962 GLUCOSE BLOOD TEST: CPT

## 2020-01-18 PROCEDURE — 82947 ASSAY GLUCOSE BLOOD QUANT: CPT

## 2020-01-18 PROCEDURE — 71045 X-RAY EXAM CHEST 1 VIEW: CPT | Mod: 26

## 2020-01-18 PROCEDURE — 87641 MR-STAPH DNA AMP PROBE: CPT

## 2020-01-18 PROCEDURE — 94760 N-INVAS EAR/PLS OXIMETRY 1: CPT

## 2020-01-18 PROCEDURE — 93922 UPR/L XTREMITY ART 2 LEVELS: CPT

## 2020-01-18 PROCEDURE — 93458 L HRT ARTERY/VENTRICLE ANGIO: CPT

## 2020-01-18 PROCEDURE — 80053 COMPREHEN METABOLIC PANEL: CPT

## 2020-01-18 PROCEDURE — 86703 HIV-1/HIV-2 1 RESULT ANTBDY: CPT

## 2020-01-18 PROCEDURE — 85730 THROMBOPLASTIN TIME PARTIAL: CPT

## 2020-01-18 PROCEDURE — 93005 ELECTROCARDIOGRAM TRACING: CPT

## 2020-01-18 PROCEDURE — 84100 ASSAY OF PHOSPHORUS: CPT

## 2020-01-18 PROCEDURE — 82330 ASSAY OF CALCIUM: CPT

## 2020-01-18 PROCEDURE — C1889: CPT

## 2020-01-18 PROCEDURE — 97110 THERAPEUTIC EXERCISES: CPT

## 2020-01-18 PROCEDURE — 97116 GAIT TRAINING THERAPY: CPT

## 2020-01-18 PROCEDURE — 80061 LIPID PANEL: CPT

## 2020-01-18 PROCEDURE — 93306 TTE W/DOPPLER COMPLETE: CPT

## 2020-01-18 PROCEDURE — 71045 X-RAY EXAM CHEST 1 VIEW: CPT

## 2020-01-18 PROCEDURE — 81001 URINALYSIS AUTO W/SCOPE: CPT

## 2020-01-18 PROCEDURE — 82435 ASSAY OF BLOOD CHLORIDE: CPT

## 2020-01-18 RX ORDER — SENNA PLUS 8.6 MG/1
2 TABLET ORAL
Qty: 60 | Refills: 0
Start: 2020-01-18 | End: 2020-02-16

## 2020-01-18 RX ORDER — ENOXAPARIN SODIUM 100 MG/ML
28 INJECTION SUBCUTANEOUS
Qty: 10 | Refills: 1
Start: 2020-01-18 | End: 2020-03-17

## 2020-01-18 RX ORDER — EZETIMIBE 10 MG/1
1 TABLET ORAL
Qty: 30 | Refills: 0
Start: 2020-01-18 | End: 2020-02-16

## 2020-01-18 RX ORDER — SODIUM,POTASSIUM PHOSPHATES 278-250MG
1 POWDER IN PACKET (EA) ORAL
Refills: 0 | Status: COMPLETED | OUTPATIENT
Start: 2020-01-18 | End: 2020-01-18

## 2020-01-18 RX ORDER — METOPROLOL TARTRATE 50 MG
1 TABLET ORAL
Qty: 60 | Refills: 1
Start: 2020-01-18 | End: 2020-03-17

## 2020-01-18 RX ORDER — POTASSIUM CHLORIDE 20 MEQ
40 PACKET (EA) ORAL EVERY 4 HOURS
Refills: 0 | Status: COMPLETED | OUTPATIENT
Start: 2020-01-18 | End: 2020-01-18

## 2020-01-18 RX ORDER — ASPIRIN/CALCIUM CARB/MAGNESIUM 324 MG
1 TABLET ORAL
Qty: 30 | Refills: 1
Start: 2020-01-18 | End: 2020-03-17

## 2020-01-18 RX ORDER — INSULIN LISPRO 100/ML
7 VIAL (ML) SUBCUTANEOUS
Qty: 10 | Refills: 1
Start: 2020-01-18 | End: 2020-03-17

## 2020-01-18 RX ADMIN — LIDOCAINE 2 PATCH: 4 CREAM TOPICAL at 13:53

## 2020-01-18 RX ADMIN — Medication 325 MILLIGRAM(S): at 13:54

## 2020-01-18 RX ADMIN — Medication 25 MILLIGRAM(S): at 05:37

## 2020-01-18 RX ADMIN — Medication 1 TABLET(S): at 09:14

## 2020-01-18 RX ADMIN — Medication 40 MILLIEQUIVALENT(S): at 13:54

## 2020-01-18 RX ADMIN — Medication 7 UNIT(S): at 09:00

## 2020-01-18 RX ADMIN — ENOXAPARIN SODIUM 40 MILLIGRAM(S): 100 INJECTION SUBCUTANEOUS at 13:54

## 2020-01-18 RX ADMIN — PANTOPRAZOLE SODIUM 40 MILLIGRAM(S): 20 TABLET, DELAYED RELEASE ORAL at 13:53

## 2020-01-18 RX ADMIN — Medication 40 MILLIEQUIVALENT(S): at 09:12

## 2020-01-18 NOTE — PROGRESS NOTE ADULT - PROBLEM/PLAN-5
DISPLAY PLAN FREE TEXT
today

## 2020-01-18 NOTE — DISCHARGE NOTE PROVIDER - NSDCCPTREATMENT_GEN_ALL_CORE_FT
PRINCIPAL PROCEDURE  Procedure: CABG, with endoscopic vein harvesting  Findings and Treatment: CABGx4 LIMA-LAD, Vein-Vein-RPDA, OM, Diag  Endoscopic procurement of greater saphenous vein from right lower extremity

## 2020-01-18 NOTE — PROGRESS NOTE ADULT - ASSESSMENT
Assessment	  1) Solitary Rt  kidney s/p donor left nephrectomy  2) AUBREE- resolved  3) Multivessel CAD s/p CABG  4) HTN    Plan  Scr stable at baseline  BP stable; continue current management  UA bland, no proteinuria  Renal US reviewed  Nephrology follow up outpt

## 2020-01-18 NOTE — PROGRESS NOTE ADULT - PROBLEM SELECTOR PROBLEM 2
HLD (hyperlipidemia)
S/p nephrectomy

## 2020-01-18 NOTE — PROGRESS NOTE ADULT - PROBLEM SELECTOR PLAN 2
On Zetia  Welchol at home  Intolerance to statins (possibly d/t elevated LFT in past)
Pt hydrated with 100cc/hr 25% albumin postop X 3 hrs.   Renal following.  Trend BUN/Cr.
Renal following.  Trend BUN/Cr.  No diuretics at this time.
Renal following.  Trend BUN/Cr.  No diuretics at this time.
Pt hydrated with 100cc/hr plasmalyte  Renal following, cleared pt for OR later today, and will continue to monitor post op.
Renal following.  Trend BUN/Cr.  No diuretics at this time.

## 2020-01-18 NOTE — PROGRESS NOTE ADULT - PROBLEM SELECTOR PLAN 8
SCDs and Lovenox for DVT prophylaxis  Protonix for GI prophylaxis.  Continue with bowel regimen as needed postoperatively.
SCDs and Lovenox for DVT prophylaxis  Protonix for GI prophylaxis.  Continue with bowel regimen as needed postoperatively.
SCDs for DVT prophylaxis, will discuss starting chemical AC today at AM rounds.   Protonix for GI prophylaxis.  Continue with bowel regimen as needed postoperatively.
SCDs and Lovenox for DVT prophylaxis  Protonix for GI prophylaxis.  Continue with bowel regimen as needed postoperatively.

## 2020-01-18 NOTE — DISCHARGE NOTE PROVIDER - PROVIDER TOKENS
PROVIDER:[TOKEN:[[id number]]]
PROVIDER:[TOKEN:[[id number]],FOLLOWUP:[2 weeks]],PROVIDER:[TOKEN:[[id number]],FOLLOWUP:[1 month]],PROVIDER:[TOKEN:[[id number]],FOLLOWUP:[1 week]]

## 2020-01-18 NOTE — DISCHARGE NOTE PROVIDER - HOSPITAL COURSE
s/p [facility] RFA #5         CONTRAST GIVEN: Omnipaque 56 ml.    MEDICATIONS GIVEN: Midazolam, 1 mg, IV. Fentanyl, 50 mcg, IV. 1% Lidocaine,    10 ml, subcutaneously.    VENTRICLES: There were no left ventricular global or regional wall motion    abnormalities. EF calculated by contrast ventriculography was 60 %.    CORONARY VESSELS: The coronary circulation is right dominant.    LM:   --  LM: Angiography showed minor luminal irregularities with no flow    limiting lesions.    LAD:   --  Mid LAD: The distal vessel was supplied by extensive collaterals    from the third obtuse marginal. There was a 100 % stenosis.    --  D1: There was a discrete 90 % stenosis at the ostium of the vessel    segment. The lesion was hazy, complex, and eccentric. There was ANDRY grade    3 flow through the vessel (brisk flow).    CX:   --  OM2: The vessel was large sized. There was a tubular 100 %    stenosis in the proximal third of the vessel segment. There was ANDRY grade    0 flow through the vessel (no flow).    RCA:   --  Proximal RCA: There was a tubular 99 % stenosis. The lesion was    hazy, complex,and eccentric. There was ANDRY grade 1 flow through the    vessel (slow flow without perfusion).    --  RPDA: The distal vessel was supplied by extensive collaterals from the    third obtuse marginal.    ARCH VESSELS: LIMA: Normal.    COMPLICATIONS: No complications occurred during the cath lab visit.    DIAGNOSTIC IMPRESSIONS: Unstable angina with minimal exertion (CCS cl III)    Severe native three vessel CAD with preserved LV function    The LIMA is widely patent    DIAGNOSTIC RECOMMENDATIONS: Admit to Dr Ann for 4V CABG (LAD,D1, OM2,    RPDA)    Gentle hydration    Agrressive antihypertensive (BB, ACEI) and lipid-lowering therapy    (atorvastatin 80 mg daily)    Will follow    INTERVENTIONAL IMPRESSIONS: Unstable angina with minimal exertion (CCS cl    III)    Severenative three vessel CAD with preserved LV function    The LIMA is widely patent    INTERVENTIONAL RECOMMENDATIONS: Admit to Dr Ann for 4V CABG (LAD,D1,    OM2, RPDA)    Gentle hydration    Agrressive antihypertensive (BB, ACEI) and lipid-lowering therapy    (atorvastatin 80 mg daily)
54 year old male patient with PMHx of HTN, HLD, type 2 diabetes (HA1c 8.6), and s/p donor nephrectomy 2006 presented originally with worsening stable angina symptoms. Saw his PCP whom he had not seen x 10 years who then referred him to cardiologist and was found to have multivessel CAD on cath 01/10/20. Patient was scheduled for open heart surgery 1/13, but the surgery was postponed secondary to elevated Cr.  Nephrology was consulted, pt hydrated, renal US performed, and Cr returned back to baseline. Patient was cleared by nephrology and now s/p C4L (LIMA-LAD, SVG-PDA, SVG-OM, SVG-Diag) with R EVH 01/14. Extubated POD #0.  Uncomplicated postop course and cleared for discharge today.

## 2020-01-18 NOTE — DISCHARGE NOTE PROVIDER - NSDCFUADDINST_GEN_ALL_CORE_FT
No heavy lifting, driving, sex, tub baths, swimming, or any activity that submerges the lower half of the body in water for 48 hours.  Limited walking and stairs for 48 hours.    Change the bandaid after 24 hours and every 24 hours after that.  Keep the puncture site dry and covered with a bandaid until a scab forms.    Observe the site frequently.  If bleeding or a large lump (the size of a golf ball or bigger) occurs lie flat, apply continuous direct pressure just above the puncture site for at least 10 minutes, and notify your physician immediately.  If the bleeding cannot be controlled, call 911 immediately for assistance.  Notify your physician of pain, swelling or any drainage.    Notify your physician immediately if coldness, numbness, discoloration or pain in your foot occurs.  REMOVE RIGHT GROIN DRESSING WITHIN 24 HOURS
Please call the Cardiothoracic Surgery office at [phone] if you are experiencing any shortness of breath, chest pain, fevers or chills, drainage from the incisions, persistent nausea, vomiting or if you have any questions about your medications. If the symptoms are severe, call 911 and go to the nearest hospital. You can also call [phone] for an emergency [facility] ambulance, which will take you to the closest [facility].    If you need any assistance for making any appointments for a new consult or referral in any specialty, please call our [facility] Clinical Coordination Center at [phone].

## 2020-01-18 NOTE — DISCHARGE NOTE PROVIDER - NSDCFUADDAPPT_GEN_ALL_CORE_FT
Endocrinologist appointment: January 27, 2020 at 2 PM with Dr Roe.    Please follow up with Dr. Ann on  at [facility]. Please make an appointment after one week at home and before your appointment to get a CXR complete (script in folder). We prefer the test be done at [facility] Radiology Department. Call [phone] to make an appointment.

## 2020-01-18 NOTE — DISCHARGE NOTE PROVIDER - NSDCFUSCHEDAPPT_GEN_ALL_CORE_FT
MARGOT JESUS ; 01/27/2020 ; [facility] Endocrin [address]
MARGOT JESUS ; 01/13/2020 ; N[facility] CT Surg [address]

## 2020-01-18 NOTE — PROGRESS NOTE ADULT - PROBLEM SELECTOR PLAN 4
By history but not hemodynamically significant on ultrasound  < from: US Duplex Carotid Arteries Complete, Bilateral (01.10.20 @ 21:03) >    IMPRESSION:     1. 1.2 cm left lobe thyroid nodule. Dedicated thyroid ultrasound nonemergent basis.  2. No hemodynamically significant stenosis.     < end of copied text >
Currently not on statin due to intolerance with elevated LFTs.  Restart Zetia.
Currently not on statin due to intolerance with elevated LFTs
Currently not on statin due to intolerance with elevated LFTs.  Restart Zetia.

## 2020-01-18 NOTE — DISCHARGE NOTE PROVIDER - NSDCCPCAREPLAN_GEN_ALL_CORE_FT
PRINCIPAL DISCHARGE DIAGNOSIS  Diagnosis: Coronary artery disease involving native coronary artery of native heart without angina pectoris  Assessment and Plan of Treatment: 1. Take ALL of your medications as ordered. Fill your prescriptions the day you are discharged and take according to the schedule you were given. Continue to take a stool softener if you are taking narcotic pain medications. AVOID medications such as ibuprofen or naproxen if you have had bypass surgery. If you have any questions or are unable to fill the prescriptions, please call the office right away at [phone].  2. Shower daily. Clean all incisions daily while showering with warm water and mild soap, pat dry with a clean towel and do not cover with any dressings unless instructed to. No bathing, swimming in a pool or the ocean until instructed by MD.  DO NOT use creams or lotions on the wound.  3. We advise that you do not drive until instructed by MD. Sit in the rear passenger seat with the red pillow between chest and seatbelt to avoid injury in the event of a motor vehicle accident until you see the surgeon again in the office.  4. You may not return to work until instructed by MD.   5. Please eat a low fat, low cholesterol, low salt diet. (No added/extra salt). A nutritional supplement like Ensure or Glucerna (for diabetics) may help you reach your nutritional goals after surgery and aid in your recovery.  6. Weigh yourself every day in the morning and record it in the weight log in your red folder. Notify the office of any weight gain more than 2-3 pounds in 24 hours.  **Please LIMIT your fluid intake to less than a liter a day.**  7. Continue breathing exercises several times a day. Continue to use your heart pillow when coughing.  8. No heavy lifting nothing greater than 5 pounds until cleared by MD. We recommend that you sleep on your back and not your side or stomach for the next 4-6 weeks.  9. Call / Notify MD any fever greater than 101.0, any drainage from incisions or if they become red, hot or very tender to the touch.  10. Increase activity as tolerated. Walk indoors and/or outdoors at least 3 times a day.      SECONDARY DISCHARGE DIAGNOSES  Diagnosis: HLD (hyperlipidemia)  Assessment and Plan of Treatment: Take all medications as prescribed.    Diagnosis: HTN (hypertension)  Assessment and Plan of Treatment: Take all medications as prescribed.    Diagnosis: Type 2 diabetes mellitus without complication, without long-term current use of insulin  Assessment and Plan of Treatment: It is extremely important to follow a diabetic (consistent carbohydrates, LOW/NO ADDED SUGAR) diet and monitor your glucose (sugar) levels. You have an increased risk of complications, including wound infections, due to the diabetes.  1. Check your glucoses 3-4 times daily before meals and bedtime.   2. Keep a log of your glucose levels every day.   3. Make an appointment to meet with your primary care provider or endocrinologist within a week.   4. Take ALL of your medications as prescribed. Only hold medications for low glucose levels (< 80) or if you are symptomatic (dizzy, sweating, lightheaded).  5. Ideally, we would like all of the glucose levels to be between .   You may have been discharged on different medications than you were taking before. Your body reacts differently to the medications after surgery. Please continue to take the medications as prescribed and notify the office, nurse practitioner or your PCP if you have any concerns right away.    Diagnosis: Thyroid nodule  Assessment and Plan of Treatment: A thyroid nodule was found incidentally on your testing. Please follow up with your primary care doctor for further testing.

## 2020-01-18 NOTE — PROGRESS NOTE ADULT - ASSESSMENT
54 year old male patient with PMHx of HTN, HLD, type 2 diabetes (HA1c 8.6), and s/p donor nephrectomy 2006 presented originally with worsening stable angina symptoms. Saw his PCP whom he had not seen x 10 years who then referred him to cardiologist and was found to have multivessel CAD on cath 01/10/20. Patient was scheduled for open heart surgery 1/13, but the surgery was postponed secondary to elevated Cr.  Nephrology was consulted, pt hydrated, renal US performed, and Cr returned back to baseline. Patient was cleared by nephrology and now s/p C4L (LIMA-LAD, SVG-PDA, SVG-OM, SVG-Diag) with R EVH 01/14. Extubated POD #0.  Uncomplicated postop course and cleared for discharge today.

## 2020-01-18 NOTE — PROGRESS NOTE ADULT - PROBLEM SELECTOR PLAN 1
Preop workup in progress  BB, ASA  No statins d/t elevated LFTs in past by report  Preop prep/showers  Had planned to do radial grafting however duplex shows incomplete palmar arches bilaterally  To OR Monday afternoon with Dr Ann.
S/p C4L 01/14.  Neurologically intact.  Hemodynamically stable.  Consider starting lopressor this am if BP remains stable.   Continue Aspirin for acute graft closure prophylaxis.  Lipitor held due to patient intolerance (elevated LFTs).  Extubated yesterday, oxygenating well, coughing and deep breathing exercises/incentive spirometry instructed and encouraged.  Dysphagia screen pending, advance diet as tolerated.   No diuresis at this time, replete electrolytes PRN, lomeli catheter to remain in place POD #1 for urine output monitoring in critically ill patient.  Insulin infusion with FS Q1H (titrated per CTICU protocol) for glycemic control.  Plan for swan-radha catheter to be d/c'd later today.   OOB to chair and PT consult this morning once swan removed.   Pain medications PRN for analgesia.  Continue post-op GI, DVT, abx prophylaxis.  Continue with supportive ICU care as needed.  Above plan of care to be discussed with Dr. Ann / CT surgical team on am rounds.
S/p C4L 01/14.  Neurologically intact.  Hemodynamically stable.  Continue Lopressor 25 mg BID  Continue Aspirin for acute graft closure prophylaxis.  Lipitor held due to patient intolerance (elevated LFTs).  Cardiology suggested Praluent but needs ins approval and is a lengthy process  May need to go home on Zetia   Oxygenating well, coughing and deep breathing exercises/incentive spirometry instructed and encouraged.  Continue Dash diet  No diuresis at this time, lomeli d/c'd, continue strict I/Os  D/C CT   Pt awaiting bed on floor for downgrade and transfer later today
S/p C4L 01/14.  Neurologically intact.  Hemodynamically stable.  Continue Lopressor 25 mg BID  Continue Aspirin for acute graft closure prophylaxis.  Lipitor held due to patient intolerance (elevated LFTs).  Cardiology suggested Praluent but needs ins approval and is a lengthy process  Will go home on Zetia   Oxygenating well, coughing and deep breathing exercises/incentive spirometry instructed and encouraged.  Continue Dash diet  No diuresis at this time, yani d/c'd, continue strict I/Os  For discharge today  D/W Dr Ann
right groin care  Zetia for lipids   No ACE/ARB prior to CTS  CTS consult pending  FU Dr Velazquez outpt  cardiac rehab info provided/referral and communication to cardiac rehab completed
Plan for CABG later today, pre op orders and testing completed  Lovenox held  Lopressor 25 BID
S/p C4L 01/14.  Neurologically intact.  Hemodynamically stable.  Continue Lopressor 25 mg BID  Continue Aspirin for acute graft closure prophylaxis.  Lipitor held due to patient intolerance (elevated LFTs).  Oxygenating well, coughing and deep breathing exercises/incentive spirometry instructed and encouraged.  Continue Dash diet  No diuresis at this time, lomeli d/c'd, continue strict I/Os  Maintain CTs to LWS for now, consider d/c'g later today  Pt awaiting bed on floor for downgrade and transfer later today

## 2020-01-18 NOTE — DISCHARGE NOTE PROVIDER - CARE PROVIDERS DIRECT ADDRESSES
,DirectAddress_Unknown
,DirectAddress_Unknown,DirectAddress_Unknown,[email or web address]

## 2020-01-18 NOTE — DISCHARGE NOTE PROVIDER - NSDCMRMEDTOKEN_GEN_ALL_CORE_FT
aspirin 325 mg oral delayed release tablet: 1 tab(s) orally once a day  bd nanoneedle 4 mm 32 g: use with insulin as instructed 4 x daily  CXR: PA/Lat  Please obtain prior to appointment with Dr Ann.   DME glucometer: use to check glucoses 2-4 x daily as instructed  HumaLOG KwikPen 100 units/mL injectable solution: 7 unit(s) subcutaneous 3 times a day (before meals)   lancets: use to check glucoses 2-4 x daily as instructed  Lantus Solostar Pen 100 units/mL subcutaneous solution: 28 unit(s) subcutaneous once a day (at bedtime)   metoprolol tartrate 25 mg oral tablet: 1 tab(s) orally 2 times a day  Percocet 5/325 oral tablet: 1 tab(s) orally every 4 hours, As needed, Moderate Pain (4 - 6) MDD:6  senna oral tablet: 2 tab(s) orally once a day (at bedtime)  test strips: use to check glucoses 2-4 x daily as instructed aspirin 325 mg oral delayed release tablet: 1 tab(s) orally once a day  bd nanoneedle 4 mm 32 g: use with insulin as instructed 4 x daily  CXR: PA/Lat  Please obtain prior to appointment with Dr Ann.   DME glucometer: use to check glucoses 2-4 x daily as instructed  HumaLOG KwikPen 100 units/mL injectable solution: 7 unit(s) subcutaneous 3 times a day (before meals)   lancets: use to check glucoses 2-4 x daily as instructed  Lantus Solostar Pen 100 units/mL subcutaneous solution: 28 unit(s) subcutaneous once a day (at bedtime)   metoprolol tartrate 25 mg oral tablet: 1 tab(s) orally 2 times a day  Percocet 5/325 oral tablet: 1 tab(s) orally every 4 hours, As needed, Moderate Pain (4 - 6) MDD:6  senna oral tablet: 2 tab(s) orally once a day (at bedtime)  test strips: use to check glucoses 2-4 x daily as instructed  Zetia 10 mg oral tablet: 1 tab(s) orally once a day (at bedtime)

## 2020-01-18 NOTE — DISCHARGE NOTE PROVIDER - CARE PROVIDER_API CALL
Jose Luis Velazquez)  Cardiovascular Disease; Internal Medicine  [address] [place] [address]  Phone: [phone]  Fax: [phone]  Follow Up Time:
Akbar Ann)  Surgery; Thoracic and Cardiac Surgery  [address] [place] [address]  Phone: [phone]  Fax: [phone]  Follow Up Time: 2 weeks    Morales Pinto)  Cardiovascular Disease; Internal Medicine; Interventional Cardiology  [address] [facility], [address]  [place] [address]  Phone: [phone]  Fax: [phone]  Follow Up Time: 1 month    Sydni Roe)  EndocrinologyMetabDiabetes  [address] [place], [place] [id number]  Phone: [phone]  Fax: [phone]  Follow Up Time: 1 week

## 2020-01-18 NOTE — DISCHARGE NOTE PROVIDER - INSTRUCTIONS
low salt  low fat  Plant based food choices
Choose lean meats and poultry without skin and prepare them without added saturated/trans fat. Choose fish at least twice a week, especially those high in omega-3 (salmon or trout). Select fat-free or low-fat dairy products. To lower cholesterol, reduce saturated fat to no more than 5 to 6 percent of total calories. For someone eating 2,000 calories a day, that’s about 13 grams of saturated fat.  Cut back on beverages and foods with added sugars.  Choose and prepare foods with little or no salt. To lower blood pressure, reducing daily intake of sodium to 1,500 mg is desirable because it can lower blood pressure.  If you drink alcohol, drink sparingly and NOT if you are taking narcotics for pain. Follow the American Heart Association recommendations when you eat out, and keep an eye on your portion sizes.

## 2020-01-18 NOTE — PROGRESS NOTE ADULT - PROBLEM SELECTOR PLAN 5
Monitor BUN/Creat
Plan as above.
Plan as above.  Appreciate Dr. Salazar's input, Zetia has been resumed, will consider alternative statin at later date
Plan as above.  Appreciate Dr. Salazar's input, Zetia has been resumed, will consider alternative statin at later date
Above
Plan as above.  Appreciate Dr. Salazar's input, Zetia has been resumed, will consider alternative statin at later date

## 2020-01-18 NOTE — PROGRESS NOTE ADULT - PROBLEM SELECTOR PROBLEM 4
Bilateral carotid artery disease, unspecified type
HLD (hyperlipidemia)

## 2020-01-18 NOTE — PROGRESS NOTE ADULT - SUBJECTIVE AND OBJECTIVE BOX
[facility] DIVISION OF KIDNEY DISEASES AND HYPERTENSION -- FOLLOW UP NOTE  --------------------------------------------------------------------------------  Chief Complaint: AUBREE    24 hour events/subjective:  Pt seen and examined; feels well      PAST HISTORY  --------------------------------------------------------------------------------  No significant changes to PMH, PSH, FHx, SHx, unless otherwise noted    ALLERGIES & MEDICATIONS  --------------------------------------------------------------------------------  Allergies    No Known Allergies    Intolerances    Lipitor (Hepatotoxicity)  ohs (Unknown)    Standing Inpatient Medications  aspirin enteric coated 325 milliGRAM(s) Oral daily  dextrose 50% Injectable 12.5 Gram(s) IV Push once  dextrose 50% Injectable 25 Gram(s) IV Push once  dextrose 50% Injectable 25 Gram(s) IV Push once  enoxaparin Injectable 40 milliGRAM(s) SubCutaneous daily  insulin glargine Injectable (LANTUS) 28 Unit(s) SubCutaneous at bedtime  insulin lispro (HumaLOG) corrective regimen sliding scale   SubCutaneous Before meals and at bedtime  insulin lispro Injectable (HumaLOG) 7 Unit(s) SubCutaneous three times a day before meals  lidocaine   Patch 2 Patch Transdermal daily  metoprolol tartrate 25 milliGRAM(s) Oral two times a day  ondansetron Injectable 4 milliGRAM(s) IV Push once  pantoprazole    Tablet 40 milliGRAM(s) Oral daily  polyethylene glycol 3350 17 Gram(s) Oral daily  senna 2 Tablet(s) Oral at bedtime    PRN Inpatient Medications  oxycodone    5 mG/acetaminophen 325 mG 1 Tablet(s) Oral every 4 hours PRN  oxycodone    5 mG/acetaminophen 325 mG 2 Tablet(s) Oral every 4 hours PRN      REVIEW OF SYSTEMS  --------------------------------------------------------------------------------  Gen: No weight changes, fatigue, fevers/chills, weakness  Skin: No rashes  Head/Eyes/Ears/Mouth: No headache; Normal hearing; Normal vision w/o blurriness; No sinus pain/discomfort, sore throat  Respiratory: No dyspnea, cough, wheezing, hemoptysis  CV: No chest pain, PND, orthopnea  GI: No abdominal pain, diarrhea, constipation, nausea, vomiting, melena, hematochezia  : No increased frequency, dysuria, hematuria, nocturia  MSK: No joint pain/swelling; no back pain; no edema  Neuro: No dizziness/lightheadedness, weakness, seizures, numbness, tingling  Heme: No easy bruising or bleeding  Endo: No heat/cold intolerance  Psych: No significant nervousness, anxiety, stress, depression    All other systems were reviewed and are negative, except as noted.    VITALS/PHYSICAL EXAM  --------------------------------------------------------------------------------  T(C): 36.7 (01-18-20 @ 10:41), Max: 37.1 (01-17-20 @ 22:09)  HR: 98 (01-18-20 @ 10:41) (90 - 99)  BP: 122/79 (01-18-20 @ 10:41) (100/60 - 122/79)  RR: 18 (01-18-20 @ 10:41) (16 - 18)  SpO2: 98% (01-18-20 @ 05:33) (95% - 98%)  Wt(kg): --        01-17-20 @ 07:01  -  01-18-20 @ 07:00  --------------------------------------------------------  IN: 720 mL / OUT: 1350 mL / NET: -630 mL      Physical Exam:  	Gen: NAD, well-appearing  	HEENT:  supple neck  	Pulm: CTA B/L  	CV: RRR, S1S2; no rub  	Back: No spinal or CVA tenderness; no sacral edema  	Abd: +BS, soft, nontender/nondistended  	: No suprapubic tenderness  	UE: Warm,  no edema; no asterixis  	LE: Warm,; no edema  	Neuro: No focal deficits, intact gait  	Psych: Normal affect and mood  	Skin: Warm, without rashes      LABS/STUDIES  --------------------------------------------------------------------------------              10.6   8.50  >-----------<  288      [01-18-20 @ 05:44]              35.0     142  |  101  |  15.0  ----------------------------<  104      [01-18-20 @ 05:44]  3.6   |  25.0  |  1.11        Ca     9.0     [01-18-20 @ 05:44]      Mg     2.3     [01-18-20 @ 05:44]      Phos  2.3     [01-18-20 @ 05:44]    Creatinine Trend:  SCr 1.11 [01-18 @ 05:44]  SCr 1.10 [01-17 @ 06:14]  SCr 1.23 [01-16 @ 05:02]  SCr 1.24 [01-15 @ 03:03]  SCr 1.04 [01-14 @ 14:27]    Urinalysis - [01-11-20 @ 18:30]      Color Yellow / Appearance Clear / SG 1.020 / pH 6.0      Gluc 250 / Ketone Trace  / Bili Negative / Urobili Negative       Blood Trace / Protein Negative / Leuk Est Negative / Nitrite Negative      RBC 0-2 / WBC 0-2 / Hyaline  / Gran  / Sq Epi  / Non Sq Epi Occasional / Bacteria Occasional    Urine Creatinine 117      [01-14-20 @ 07:36]  Urine Protein 8.0      [01-14-20 @ 07:36]  Urine Sodium 112      [01-14-20 @ 07:36]    HbA1c 8.6      [01-10-20 @ 12:33]  TSH 1.12      [01-10-20 @ 13:33]  Lipid: chol 431, , HDL 33,       [01-10-20 @ 10:50]    HCV 0.06, Nonreact      [01-10-20 @ 22:04]  HIV Nonreact      [01-10-20 @ 10:50]       EXAM:  US KIDNEY(S)                          PROCEDURE DATE:  01/14/2020          INTERPRETATION:  CLINICAL INFORMATION: Rising creatinine    COMPARISON: CT abdomen pelvis of 4/12/2015    TECHNIQUE: Sonography of the kidneys and bladder.     FINDINGS:    Right kidney:  13 cm. No renal mass, hydronephrosis or calculi. Increased echogenicity renal parenchyma parenchymal thinning compatible with medical renal disease.    Left kidney: Prior left nephrectomy. No residual or recurrent masses in the nephrectomy bed.    Incidental note of hepatic steatosis.    IMPRESSION:     Prior left hip fracture mean. Neck renal disease of the right kidney. No hydronephrosis.

## 2020-01-18 NOTE — PROGRESS NOTE ADULT - SUBJECTIVE AND OBJECTIVE BOX
Patient discussed on morning rounds with Dr. Ann    Operation: 1/14 CABG, with endoscopic vein harvesting      Surgeon: Marissa    Referring Physician: Blair    SUBJECTIVE ASSESSMENT: "I am going home today" NAD denies cp, sob.    Vital Signs Last 24 Hrs  T(C): 36.7 (18 Jan 2020 10:41), Max: 37.1 (17 Jan 2020 22:09)  T(F): 98.1 (18 Jan 2020 10:41), Max: 98.7 (17 Jan 2020 22:09)  HR: 98 (18 Jan 2020 10:41) (90 - 99)  BP: 122/79 (18 Jan 2020 10:41) (100/60 - 122/79)  BP(mean): --  RR: 18 (18 Jan 2020 10:41) (16 - 18)  SpO2: 98% (18 Jan 2020 05:33) (95% - 98%) on RA     Tele: SR 80  LVEF: 75    I&O's Detail    17 Jan 2020 07:01  -  18 Jan 2020 07:00  --------------------------------------------------------  IN:    Oral Fluid: 720 mL  Total IN: 720 mL    OUT:    Voided: 1350 mL  Total OUT: 1350 mL    Total NET: -630 mL          PHYSICAL EXAM:  General: WN/WD NAD  Neurology: A&Ox3, nonfocal, LUTHER x 4  Respiratory: slightly diminished left base  CV: RRR, S1S2, no murmurs, rubs or gallops  Abdominal: Soft, NT, ND +BS, Last BM 1/17  Extremities: No edema, + peripheral pulses  Incisions: MSI Healing Well, Sternum Stable + few staples    LABS:                        10.6   8.50  )-----------( 288      ( 18 Jan 2020 05:44 )             35.0       01-18    142  |  101  |  15.0  ----------------------------<  104  3.6   |  25.0  |  1.11    Ca    9.0      18 Jan 2020 05:44  Phos  2.3     01-18  Mg     2.3     01-18            COUMADIN: No.        DOSE:                  INDICATION:                GOAL INR:                       Physician following/managing    ALLERGIES: Lipitor (Hepatotoxicity)  No Known Allergies  ohs (Unknown)    MEDICATIONS  (STANDING):  aspirin enteric coated 325 milliGRAM(s) Oral daily  dextrose 50% Injectable 12.5 Gram(s) IV Push once  dextrose 50% Injectable 25 Gram(s) IV Push once  dextrose 50% Injectable 25 Gram(s) IV Push once  enoxaparin Injectable 40 milliGRAM(s) SubCutaneous daily  insulin glargine Injectable (LANTUS) 28 Unit(s) SubCutaneous at bedtime  insulin lispro (HumaLOG) corrective regimen sliding scale   SubCutaneous Before meals and at bedtime  insulin lispro Injectable (HumaLOG) 7 Unit(s) SubCutaneous three times a day before meals  lidocaine   Patch 2 Patch Transdermal daily  metoprolol tartrate 25 milliGRAM(s) Oral two times a day  ondansetron Injectable 4 milliGRAM(s) IV Push once  pantoprazole    Tablet 40 milliGRAM(s) Oral daily  polyethylene glycol 3350 17 Gram(s) Oral daily  potassium chloride    Tablet ER 40 milliEquivalent(s) Oral every 4 hours  senna 2 Tablet(s) Oral at bedtime      Discharge CXR: < from: Xray Chest 1 View- PORTABLE-Routine (01.18.20 @ 05:24) >    Findings:  Left pleural effusion, unchanged. Post cardiac surgery changes. Atelectasis at the left lung base, unchanged. No evidence of pneumothorax. The right lung is clear..    Impression:  Stable exam without significant change since the previous study..    < end of copied text >      Discharge ECHO: < from: TTE Echo Complete w/Doppler (01.10.20 @ 17:26) >    Summary:   1. Technically good study.   2. Hyperdynamic global left ventricular systolic function.   3. Left ventricular ejection fraction, by visual estimation, is >75%.   4. Spectral Doppler shows impaired relaxation pattern of left ventricular myocardial filling (Grade I diastolic dysfunction).   5. Mild thickening of the anterior mitral valve leaflet.   6. Trace mitral valve regurgitation.   7. Trivial pericardial effusion.    MD Priscilla Electronically signed on 1/11/2020 at 9:24:03 AM    < end of copied text >      Discharge EKG: < from: 12 Lead ECG (01.16.20 @ 04:31) >    Diagnosis Line Normal sinus rhythm  Normal ECG    < end of copied text >      PAST MEDICAL & SURGICAL HISTORY:  Statin intolerance: elevated liver enzymes  Carotid disease, bilateral: mild  HLD (hyperlipidemia)  HTN (hypertension)  Non-compliance: with medical care  AP (angina pectoris)  Obesity  H/O elbow surgery: right  S/p nephrectomy: left side for donor status

## 2020-01-18 NOTE — PROGRESS NOTE ADULT - PROBLEM SELECTOR PROBLEM 1
CAD (coronary artery disease)
Coronary artery disease involving native coronary artery of native heart without angina pectoris
CAD (coronary artery disease)
CAD (coronary artery disease)

## 2020-01-18 NOTE — DISCHARGE NOTE PROVIDER - NSDCHC_MEDRECSTATUS_GEN_ALL_CORE
Admission Reconciliation is Not Complete  Discharge Reconciliation is Not Complete
Admission Reconciliation is Completed  Discharge Reconciliation is Completed

## 2020-01-18 NOTE — PROGRESS NOTE ADULT - SUBJECTIVE AND OBJECTIVE BOX
[facility], [facility]                                                    [address], [address], [place] [address]                                                         PHONE: [phone]    FAX: [phone] [address], [address], [place] [address]                                                 PHONE: [phone]    FAX: [phone]  *******************************************************************************    Overnight events/Subjective Assessment: found in the bed, comfortable, with no new c/o, states feels better     INTERPRETATION OF TELEMETRY (personally reviewed): SR in 70s     Lipitor (Hepatotoxicity)  No Known Allergies  ohs (Unknown)    MEDICATIONS  (STANDING):  aspirin enteric coated 325 milliGRAM(s) Oral daily  dextrose 50% Injectable 12.5 Gram(s) IV Push once  dextrose 50% Injectable 25 Gram(s) IV Push once  dextrose 50% Injectable 25 Gram(s) IV Push once  enoxaparin Injectable 40 milliGRAM(s) SubCutaneous daily  insulin glargine Injectable (LANTUS) 28 Unit(s) SubCutaneous at bedtime  insulin lispro (HumaLOG) corrective regimen sliding scale   SubCutaneous Before meals and at bedtime  insulin lispro Injectable (HumaLOG) 7 Unit(s) SubCutaneous three times a day before meals  lidocaine   Patch 2 Patch Transdermal daily  metoprolol tartrate 25 milliGRAM(s) Oral two times a day  ondansetron Injectable 4 milliGRAM(s) IV Push once  pantoprazole    Tablet 40 milliGRAM(s) Oral daily  polyethylene glycol 3350 17 Gram(s) Oral daily  potassium acid phosphate/sodium acid phosphate tablet (K-PHOS No. 2) 1 Tablet(s) Oral <User Schedule>  potassium chloride    Tablet ER 40 milliEquivalent(s) Oral every 4 hours  senna 2 Tablet(s) Oral at bedtime    MEDICATIONS  (PRN):  oxycodone    5 mG/acetaminophen 325 mG 1 Tablet(s) Oral every 4 hours PRN Moderate Pain (4 - 6)  oxycodone    5 mG/acetaminophen 325 mG 2 Tablet(s) Oral every 4 hours PRN Severe Pain (7 - 10)      Vital Signs Last 24 Hrs  T(C): 36.8 (18 Jan 2020 05:33), Max: 37.1 (17 Jan 2020 22:09)  T(F): 98.2 (18 Jan 2020 05:33), Max: 98.7 (17 Jan 2020 22:09)  HR: 92 (18 Jan 2020 05:33) (83 - 99)  BP: 100/60 (18 Jan 2020 05:33) (100/60 - 127/77)  BP(mean): --  RR: 16 (18 Jan 2020 05:33) (16 - 20)  SpO2: 98% (18 Jan 2020 05:33) (95% - 100%)    I&O's Detail    17 Jan 2020 07:01  -  18 Jan 2020 07:00  --------------------------------------------------------  IN:    Oral Fluid: 720 mL  Total IN: 720 mL    OUT:    Voided: 1350 mL  Total OUT: 1350 mL    Total NET: -630 mL        I&O's Summary    17 Jan 2020 07:01  -  18 Jan 2020 07:00  --------------------------------------------------------  IN: 720 mL / OUT: 1350 mL / NET: -630 mL            PHYSICAL EXAM:  General: Appears well developed, well nourished, no acute distress. not in acute pain  HEAD: normal cephalic. Atraumatic  PUPILS: equal and reactive to light  EARS: normal hearing  NECK: supple. no JVD or HJR. no carotid bruits. no visible lymphadenopathy  NOSE: no gross abnormalities  CHEST: symmetric chest wall expansion. 1 chest tube  CARDIOVASCULAR: Normal rate. Regular rhythm. Normal S1 and S2, no S3/S4,  no murmur, rub, or gallop  LUNGS: Normal effort. Normal respiratory rate. Breath sounds are clear to auscultation bilaterally. No respiratory distress. No stridor.  no rales, rhonchi or wheeze. no decreased Breath sounds  ABDOMEN: Soft, nontender, non-distended, positive bowel sounds, no mass or bruit. no abdominal tenderness. No rebound. no ascites  EXTREMITIES: No clubbing, cyanosis. mild LE bilat edema. normal range of motion  PULSES:  distal pulses WNL  SKIN: Warm and dry with normal turgor. no visible rash or cyanosis   NEURO: Alert & oriented x 3, grossly intact with no focal weakness  PSYCH: normal mood and affect. Grossly normal insight and judgement exhibited        LABS:                        10.6   8.50  )-----------( 288      ( 18 Jan 2020 05:44 )             35.0     01-18    142  |  101  |  15.0  ----------------------------<  104  3.6   |  25.0  |  1.11    Ca    9.0      18 Jan 2020 05:44  Phos  2.3     01-18  Mg     2.3     01-18            serum  Lipids:   Hemoglobin A1C, Whole Blood: 8.6 % (01-10 @ 12:33)    Thyroid Stimulating Hormone, Serum: 1.12 uIU/mL (01-10 @ 13:33)        RADIOLOGY & ADDITIONAL STUDIES:    < from: Xray Chest 1 View- PORTABLE-Urgent (01.16.20 @ 10:57) >    FINDINGS AND   IMPRESSION: Status post removal of left chest tube. Small left pleural effusion. Right lung is grossly clear. Patient is status post sternotomy and CABG. Mediastinal drain in place.    < end of copied text >    < from: Cardiac Cath Lab - Adult (01.10.20 @ 11:59) >  VENTRICLES: There were no left ventricular global or regional wall motion  abnormalities. EF calculated by contrast ventriculography was 60 %.  CORONARY VESSELS: The coronary circulation is right dominant.  LM:   --  LM: Angiography showed minor luminal irregularities with no flow  limiting lesions.  LAD:   --  Mid LAD: The distal vessel was supplied by extensive collaterals  from the third obtuse marginal. There was a 100 % stenosis.  --  D1: There was a discrete 90 % stenosis at the ostium of the vessel  segment. The lesion was hazy, complex, and eccentric. There was ANDRY grade  3 flow through the vessel (brisk flow).  CX:   --  OM2: The vessel was large sized. There was a tubular 100 %  stenosis in the proximal third of the vessel segment. There was ANDRY grade  0 flow through the vessel (no flow).  RCA:   --  Proximal RCA: There was a tubular 99 % stenosis. The lesion was  hazy, complex,and eccentric. There was ANDRY grade 1 flow through the  vessel (slow flow without perfusion).  --  RPDA: The distal vessel was supplied by extensive collaterals from the  third obtuse marginal.  ARCH VESSELS: LIMA: Normal.  COMPLICATIONS: No complications occurred during the cath lab visit.  DIAGNOSTIC IMPRESSIONS: Unstable angina with minimal exertion (CCS cl III)  Severe native three vessel CAD with preserved LV function  The LIMA is widely patent    < end of copied text >    < from: TTE Echo Complete w/Doppler (01.10.20 @ 17:26) >  Summary:   1. Technically good study.   2. Hyperdynamic global left ventricular systolic function.   3. Left ventricular ejection fraction, by visual estimation, is >75%.   4. Spectral Doppler shows impaired relaxation pattern of left ventricular myocardial filling (Grade I diastolic dysfunction).   5. Mild thickening of the anterior mitral valve leaflet.   6. Trace mitral valve regurgitation.   7. Trivial pericardial effusion.       ASSESSMENT AND PLAN:  In summary, MARGOT JESUS is a 54y Male with past medical history significant for presentation with intermittent chest pain with exertion, and chest heaviness resolving with rest without radiation. No SOB. Pt went to his PCP whom he had not seen x10 years who then referred him to cardiologist.    States daily compliance with aspirin 81 +FH CAD premature, HTN, HLD, non compliance, angina, former smoker/s/p nephrectomy for donor status. Cardiac catheterization with severe CAD, now s/p CABG 1/14/20    - Severe multivessel CAD with preserved LV function.  s/p CABG 1/14/20 (LIMA to LAD, SVG-PDA, SVG-OM, SVG-Diagonal). post op day 3. on ASA 325mg daily    - Mild diffuse edema. Consider IV lasix PRN for mobilization of post operative volume overload    - HTN. metoprolol 25mg BID. BP is controlled    - HL. eventual statin?. Hepatotoxicity with lipitor in the past. Markedly abnormal lipid panel. 1/10 with chol 431, Trig 279, HDL 33,  Will resume zetia. Will need praluent as pt with intolerance of statins with hepatic toxicity documented    - Past nephrectomy due to donor status. Continue to monitor renal function.  No CHF on exam, but mild diffuse edema/fluid overload due to post operative state. May need diuretic PRN for mobilizaiton of fluid.  I personally dw Homar of CTS    - Respiratory status. Encouragement with incentive spirometry offered.  Removal of remaining chest tube as per CT surgery.  The pt expresses understanding and will attempt to improve his use of the incentive spirometer. Has been ambulating    - Hemodynamics stable.    - Telemetry monitoring personally reviewed by kristina velarde in SR    - Pending approval for praluent 75mg SC biweekly. Lasix x 1 for volume mobilization post op    - Pt currently in the bed. OOB/PT as tolerated    - I have personally reviewed all obtainable recent and prior records and data

## 2020-01-18 NOTE — PROGRESS NOTE ADULT - PROBLEM SELECTOR PROBLEM 5
S/p nephrectomy
Statin intolerance

## 2020-01-18 NOTE — DISCHARGE NOTE PROVIDER - NSDCACTIVITY_GEN_ALL_CORE
Walking - Outdoors allowed/Walking - Indoors allowed/Do not drive or operate machinery/Do not make important decisions/Stairs allowed/No heavy lifting/straining/Showering allowed

## 2020-01-18 NOTE — PROGRESS NOTE ADULT - REASON FOR ADMISSION
CAD
CAD
CP
cad
ischemic CAD
ischemic heart disease
post op CABG
CABG
CAD
S/P CABG
S/P CABG
Single Kidney, AUBREE
chest pain
MVCD
Multivessel coronary artery disease
CAD

## 2020-01-18 NOTE — PROGRESS NOTE ADULT - PROBLEM SELECTOR PLAN 6
HA1c 8.6.  Insulin infusion with FS Q1H (titrated per CTICU protocol) for glycemic control.  Plan to transition to sq insulin later today.
HA1c 8.6.  Lantus 28 units q am with Humalog premeal and sliding scale coverage started,  Insulin gtt d/c'd
HA1c 8.6.  Lantus 28 units q am with Humalog premeal and sliding scale coverage started,  Insulin gtt d/c'd
Lispro sliding scale  Needs endocrine consult  Strict glycemic control sanjay-op
HA1c 8.6.  Lantus 28 units q am with Humalog premeal and sliding scale coverage started,  Insulin gtt d/c'd
Will most likely require insulin gtt post op.

## 2020-01-22 DIAGNOSIS — Z86.79 PERSONAL HISTORY OF OTHER DISEASES OF THE CIRCULATORY SYSTEM: ICD-10-CM

## 2020-01-22 PROCEDURE — 99024 POSTOP FOLLOW-UP VISIT: CPT

## 2020-01-23 ENCOUNTER — APPOINTMENT (OUTPATIENT)
Dept: CARE COORDINATION | Facility: HOME HEALTH | Age: 55
End: 2020-01-23
Payer: COMMERCIAL

## 2020-01-23 VITALS
SYSTOLIC BLOOD PRESSURE: 120 MMHG | WEIGHT: 180 LBS | BODY MASS INDEX: 27.28 KG/M2 | OXYGEN SATURATION: 98 % | DIASTOLIC BLOOD PRESSURE: 88 MMHG | HEART RATE: 90 BPM | HEIGHT: 68 IN | RESPIRATION RATE: 16 BRPM

## 2020-01-23 PROBLEM — Z86.79 HISTORY OF HYPERTENSION: Status: RESOLVED | Noted: 2020-01-23 | Resolved: 2020-01-23

## 2020-01-23 RX ORDER — EZETIMIBE 10 MG/1
10 TABLET ORAL
Refills: 0 | Status: ACTIVE | COMMUNITY

## 2020-01-23 RX ORDER — METOPROLOL TARTRATE 25 MG/1
25 TABLET, FILM COATED ORAL TWICE DAILY
Refills: 0 | Status: ACTIVE | COMMUNITY

## 2020-01-23 RX ORDER — ASPIRIN 325 MG/1
325 TABLET, FILM COATED ORAL
Refills: 0 | Status: ACTIVE | COMMUNITY

## 2020-01-23 NOTE — HISTORY OF PRESENT ILLNESS
[FreeTextEntry1] : 54 YOM with pmhx including CAD, DMII, HTN, HLD, donor with nephrectomy. Pt underwent CABG x 4 with Dr. Ann. Post op course with hyperglycemia, otherwise unremarkable. Pt discharged home with supportive spouse and home care services. Pt has since deferred home care services at this time. Initial [facility] visit. \par CC "I'm feeling well"

## 2020-01-23 NOTE — ASSESSMENT
[FreeTextEntry1] : Pt recovering well at home s/p OHS. Reviewed all medications and dosages with pt understanding. Pt has all medications in home and is taking as prescribed. Pain controlled with current medication regimen, pt using occasional Tylenol. No new symptoms, issues or concerns to report at this time. Reviewed diet at length. Pt blood glucose very well controlled with avg in 's with current regimen. Pt had experienced low blood sugar at 3 AM which resolved with juice and a cookie. Pt now understands to have an evening snack that includes complex carb or protein, pt has not since experienced decrease in blood sugar. Pt congratulated on diet changes. He agrees to call NP with any issues or concerns that arise. \par

## 2020-01-23 NOTE — PHYSICAL EXAM
[Sclera] : the sclera and conjunctiva were normal [Neck Appearance] : the appearance of the neck was normal [Respiration, Rhythm And Depth] : normal respiratory rhythm and effort [Apical Impulse] : the apical impulse was normal [Auscultation Breath Sounds / Voice Sounds] : lungs were clear to auscultation bilaterally [Chest Visual Inspection Thoracic Asymmetry] : no chest asymmetry [Heart Sounds] : normal S1 and S2 [1+] : left 1+ [Abdomen Soft] : soft [FreeTextEntry2] : B/L LE without edema, B/L calves soft, NT [Abdomen Tenderness] : non-tender [Abnormal Walk] : normal gait [Skin Color & Pigmentation] : normal skin color and pigmentation [FreeTextEntry1] : SVG harvest site without erythema, warmth or drainage [Oriented To Time, Place, And Person] : oriented to person, place, and time [] : no rash [Skin Turgor] : normal skin turgor [Impaired Insight] : insight and judgment were intact [Affect] : the affect was normal

## 2020-01-27 ENCOUNTER — APPOINTMENT (OUTPATIENT)
Dept: ENDOCRINOLOGY | Facility: CLINIC | Age: 55
End: 2020-01-27
Payer: COMMERCIAL

## 2020-01-27 ENCOUNTER — OUTPATIENT (OUTPATIENT)
Dept: OUTPATIENT SERVICES | Facility: HOSPITAL | Age: 55
LOS: 1 days | End: 2020-01-27
Payer: COMMERCIAL

## 2020-01-27 VITALS
OXYGEN SATURATION: 97 % | SYSTOLIC BLOOD PRESSURE: 124 MMHG | RESPIRATION RATE: 18 BRPM | HEART RATE: 92 BPM | HEIGHT: 68 IN | WEIGHT: 187.5 LBS | DIASTOLIC BLOOD PRESSURE: 80 MMHG | BODY MASS INDEX: 28.42 KG/M2

## 2020-01-27 DIAGNOSIS — E04.1 NONTOXIC SINGLE THYROID NODULE: ICD-10-CM

## 2020-01-27 DIAGNOSIS — Z98.890 OTHER SPECIFIED POSTPROCEDURAL STATES: Chronic | ICD-10-CM

## 2020-01-27 DIAGNOSIS — Z87.891 PERSONAL HISTORY OF NICOTINE DEPENDENCE: ICD-10-CM

## 2020-01-27 DIAGNOSIS — Z90.5 ACQUIRED ABSENCE OF KIDNEY: Chronic | ICD-10-CM

## 2020-01-27 DIAGNOSIS — I10 ESSENTIAL (PRIMARY) HYPERTENSION: ICD-10-CM

## 2020-01-27 DIAGNOSIS — E78.5 HYPERLIPIDEMIA, UNSPECIFIED: ICD-10-CM

## 2020-01-27 DIAGNOSIS — Z78.9 OTHER SPECIFIED HEALTH STATUS: ICD-10-CM

## 2020-01-27 DIAGNOSIS — Z82.49 FAMILY HISTORY OF ISCHEMIC HEART DISEASE AND OTHER DISEASES OF THE CIRCULATORY SYSTEM: ICD-10-CM

## 2020-01-27 DIAGNOSIS — I20.0 UNSTABLE ANGINA: ICD-10-CM

## 2020-01-27 LAB — GLUCOSE BLDC GLUCOMTR-MCNC: 99

## 2020-01-27 PROCEDURE — 99204 OFFICE O/P NEW MOD 45 MIN: CPT | Mod: 25

## 2020-01-27 PROCEDURE — 82962 GLUCOSE BLOOD TEST: CPT

## 2020-01-27 PROCEDURE — 71046 X-RAY EXAM CHEST 2 VIEWS: CPT | Mod: 26

## 2020-01-27 PROCEDURE — 71046 X-RAY EXAM CHEST 2 VIEWS: CPT

## 2020-01-27 RX ORDER — INSULIN LISPRO 100 [IU]/ML
100 INJECTION, SOLUTION INTRAVENOUS; SUBCUTANEOUS
Refills: 0 | Status: DISCONTINUED | COMMUNITY
End: 2020-01-27

## 2020-01-27 RX ORDER — INSULIN GLARGINE 100 [IU]/ML
100 INJECTION, SOLUTION SUBCUTANEOUS AT BEDTIME
Refills: 0 | Status: DISCONTINUED | COMMUNITY
End: 2020-01-27

## 2020-01-27 NOTE — ASSESSMENT
[FreeTextEntry1] : No new labs needed at this time as patient had labs in hospital.\par \par T2DM\par -To prevent hypoglycemia, decrease lantus to 25 units QHS.\par -Continue humalog 7 units with meals, do not inject if BS less than 80 or if not eating meal\par -Continue checking BS 4x a day, send logs weekly or call office if having low BS less than 80\par -Follow up with optometry, nephrology, and cardiology \par -Explained diabetes type 2 further to patient, what HGA1C is, and risks/complications\par -Discussed hypogylcemia signs and symptoms and how to recover from low BS\par -Increase exercise as tolerated, goal of 30 mins a day 5x a week, when cleared\par \par HLD\par Continue Zetia\par \par \par HTN\par Continue beta blocker\par \par Thyroid nodule\par Will check TFTs will next labs\par Plan for thyroid ultrasound in future \par CDE apt in 3 weeks- pt eating very well on "diabetic diet" as he has done a lot of his own research however would benefit from apt\par RTO NP apt  6 weeks

## 2020-01-27 NOTE — PHYSICAL EXAM
[Alert] : alert [No Acute Distress] : no acute distress [Well Nourished] : well nourished [Well Developed] : well developed [Normal Sclera/Conjunctiva] : normal sclera/conjunctiva [Normal Hearing] : hearing was normal [Supple] : the neck was supple [Normal Rate and Effort] : normal respiratory rhythm and effort [No Accessory Muscle Use] : no accessory muscle use [Clear to Auscultation] : lungs were clear to auscultation bilaterally [Normal Rate] : heart rate was normal  [Normal S1, S2] : normal S1 and S2 [Regular Rhythm] : with a regular rhythm [No Edema] : there was no peripheral edema [Not Tender] : non-tender [Soft] : abdomen soft [Post Cervical Nodes] : posterior cervical nodes [Anterior Cervical Nodes] : anterior cervical nodes [Normal] : normal and non tender [Normal Gait] : normal gait [No Rash] : no rash [No Tremors] : no tremors [Oriented x3] : oriented to person, place, and time [Acanthosis Nigricans] : no acanthosis nigricans

## 2020-01-27 NOTE — HISTORY OF PRESENT ILLNESS
[FreeTextEntry1] : Discharged from hospital 1/18/2020\par \par  54 year old male patient with PMHx of HTN, HLD, new diagnosis of type 2 diabetes (HA1c 8.6), and s/p donor nephrectomy 2006 presented originally with worsening stable angina symptoms. Saw his PCP whom he had not seen x 10 years who then referred him to cardiologist and was found to have multivessel CAD on cath 01/10/20. Patient was scheduled for open heart surgery 1/13, but the surgery was postponed secondary to elevated Cr.  Nephrology was consulted, pt hydrated, renal US performed, and Cr returned back to baseline. Patient was cleared by nephrology and now s/p C4L (LIMA-LAD, SVG-PDA, SVG-OM, SVG-Diag) with R EVH 01/14. \par \par T2DM\par Severity\par Onset: when work up for open heart surgery\par Duration: new diagnosis \par Modifying Factors: on insulin\par \par HGA1C 8.6\par \par SMBG\par Checks BS 4x a day- before each meal and bedtime\par Logs here in office 80s-140s\par One episode of hypoglycemia , woke up at 3 am with a BS of 53\par FS in office 99-salad with egg \par \par Current drug regimen\par Humalog 7 units with  meals\par Lantus 28 at bedtime \par \par Eye exam: sees eye doctor every year, has not been told he has DR- only high cholesterol in eyes\par Foot exam: does not see podiatry- denies numbness/tingling in feet \par Kidney disease: s/p donor nephrectomy - follows with nephrology \par Heart disease: s/p CABG , following with cardiology \par \par Weight: stable\par Diet: pt cooks, eats out of the house once and a while\par B: eggs\par L: salad, fish\par D: chicken, mrs[employer]dash seasoning\par Exercise: not cleared to do more than walking from CABG but has been walking\par Smoking: denies, stopped 1988\par \par HLD\par Zetia 10 mg daily\par Could not tolerate statin \par \par HTN\par Metoprolol 25 mg BID\par \par Thyroid nodule\par In 2010, patient had "lump in throat". FNA was benign\par Denies anterior neck pain, dysphagia, or dysphonia.

## 2020-01-27 NOTE — REVIEW OF SYSTEMS
[Shortness Of Breath] : shortness of breath [Polyuria] : polyuria [Nocturia] : nocturia [Cold Intolerance] : cold intolerant [Fatigue] : no fatigue [Recent Weight Gain (___ Lbs)] : no recent weight gain [Recent Weight Loss (___ Lbs)] : no recent weight loss [Visual Field Defect] : no visual field defect [Blurry Vision] : no blurred vision [Dysphagia] : no dysphagia [Dysphonia] : no dysphonia [Neck Pain] : no neck pain [Chest Pain] : no chest pain [Palpitations] : no palpitations [Nausea] : no nausea [Vomiting] : no vomiting was observed [Constipation] : no constipation [Diarrhea] : no diarrhea [Joint Pain] : no joint pain [Joint Stiffness] : no joint stiffness [Acanthosis] : no acanthosis  [Dry Skin] : no dry skin [Headache] : no headaches [Tremors] : no tremors [Depression] : no depression [Anxiety] : no anxiety [Polydipsia] : no polydipsia [Heat Intolerance] : heat tolerant [FreeTextEntry6] : using incentive spirometer

## 2020-01-29 ENCOUNTER — APPOINTMENT (OUTPATIENT)
Dept: CARDIOTHORACIC SURGERY | Facility: CLINIC | Age: 55
End: 2020-01-29

## 2020-01-29 ENCOUNTER — APPOINTMENT (OUTPATIENT)
Dept: CARDIOTHORACIC SURGERY | Facility: CLINIC | Age: 55
End: 2020-01-29
Payer: COMMERCIAL

## 2020-01-29 VITALS
HEIGHT: 68 IN | OXYGEN SATURATION: 98 % | SYSTOLIC BLOOD PRESSURE: 139 MMHG | WEIGHT: 187 LBS | HEART RATE: 90 BPM | DIASTOLIC BLOOD PRESSURE: 81 MMHG | TEMPERATURE: 97.8 F | BODY MASS INDEX: 28.34 KG/M2 | RESPIRATION RATE: 16 BRPM

## 2020-01-29 DIAGNOSIS — I25.10 ATHEROSCLEROTIC HEART DISEASE OF NATIVE CORONARY ARTERY W/OUT ANGINA PECTORIS: ICD-10-CM

## 2020-01-29 DIAGNOSIS — E11.9 TYPE 2 DIABETES MELLITUS W/OUT COMPLICATIONS: ICD-10-CM

## 2020-01-29 DIAGNOSIS — Z95.1 PRESENCE OF AORTOCORONARY BYPASS GRAFT: ICD-10-CM

## 2020-01-29 PROCEDURE — 99024 POSTOP FOLLOW-UP VISIT: CPT

## 2020-01-31 ENCOUNTER — APPOINTMENT (OUTPATIENT)
Dept: CARDIOTHORACIC SURGERY | Facility: CLINIC | Age: 55
End: 2020-01-31
Payer: COMMERCIAL

## 2020-02-05 ENCOUNTER — NON-APPOINTMENT (OUTPATIENT)
Age: 55
End: 2020-02-05

## 2020-02-10 RX ORDER — INSULIN LISPRO 100 [IU]/ML
100 INJECTION, SOLUTION INTRAVENOUS; SUBCUTANEOUS
Qty: 3 | Refills: 1 | Status: ACTIVE | COMMUNITY
Start: 2020-01-27

## 2020-02-14 ENCOUNTER — APPOINTMENT (OUTPATIENT)
Dept: ENDOCRINOLOGY | Facility: CLINIC | Age: 55
End: 2020-02-14

## 2020-03-13 ENCOUNTER — APPOINTMENT (OUTPATIENT)
Dept: ENDOCRINOLOGY | Facility: CLINIC | Age: 55
End: 2020-03-13

## 2020-03-25 RX ORDER — INSULIN GLARGINE 100 [IU]/ML
100 INJECTION, SOLUTION SUBCUTANEOUS
Qty: 3 | Refills: 1 | Status: ACTIVE | COMMUNITY
Start: 2020-01-27

## 2020-12-06 NOTE — ASU PATIENT PROFILE, ADULT - NSCAFFEAMTFREQ_GEN_ALL_CORE_SD
Spine appears normal, range of motion is not limited, no muscle or joint tenderness
1-2 cups/cans per day

## 2021-03-01 NOTE — BRIEF OPERATIVE NOTE - TYPE OF ANESTHESIA
Phone call from patient questioning what drops Dr. Villa would suggest for glaucoma and blurred vision.  Patient was told that she would need an exam for him to know if she was on the right eyedrops or if he felt they needed to be changed and unfortunately Dr. Villa is only seeing retina and surgical cases at this time.  She is currently a patient of Dr. Otto, is currently on Latanoprost both eyes, notices her vision is more blurred and has an appointment with him on 3/15/21.  Patient just wanted to make sure she was doing all she could for her eyes.  She was reassured and told she will be having a visual field at her appointment and that would show Dr. Otto if there were any changes going on.  Patient was told if he felt she should see another doctor, we could refer to [facility].  Patient expressed understanding.  
General

## 2021-08-27 NOTE — PATIENT PROFILE ADULT - PACKS YRS CALCULATION
Seen by podiatry- - left foot hallux wound to subq w/ toenail absent, no probing to bone, no purulence, no fluctuance, no malodor, erythema 2/2 dependent rubor  - left foot wound culture taken   - left foot xray preliminary read showing no gas, no OM   - no acute pod surgical intervention necessary  - recommend d/c on PO Clinda for 1 week 2

## 2021-11-22 NOTE — DIETITIAN INITIAL EVALUATION ADULT. - INCREASED NUTRIENT NEEDS
11/22/21, 7:37 AM EST  DISCHARGE PLANNING EVALUATION:    Bettyann Barthel       Admitted: 11/19/2021/ [facility] day: 3   Location: -17/017-A Reason for admit: Moderate to severe aortic valve regurgitation [I35.1]   PMH:  has a past medical history of Anemia, COPD (chronic obstructive pulmonary disease) ([address] and Valvular heart disease. Procedure:   11/19 AVR  11/19 Intubated - 11/19 Extubated  11/21 Chest tubes removed  11/22 CXR: Small bilateral pleural effusions with mild bibasilar atelectasis    Barriers to Discharge: POD #3. Chest tubes removed yesterday. +BM. On room air. Afebrile. NSR. CR/PT/OT. Dietitian consulted. Dietary ileus prevention protocol. Telemetry, I&O, daily weight, IS, sternal precautions, wound care, SCDs, ambulate. Amio, asa, lipitor, lovenox, pepcid, IV lasix 20 mg bid, prn roxicodone, electrolyte replacement protocols. WBC 2.8, hgb 9.8, plt 56. PCP: NATALY William CNP  Readmission Risk Score: 9.2 ( )%    Patient Goals/Plan/Treatment Preferences: Spoke with Tashi Taylor; states has a shower seat and has grab bars in her shower as well. She states she may need a walker at discharge. Denies any further DME at this time. Verified her PCP. Plan home with S[facility] and [facility]. SW on case. Transportation/Food Security/Housekeeping Addressed:  No issues identified. Protein-energy

## 2022-01-04 NOTE — PROGRESS NOTE ADULT - PROBLEM SELECTOR PLAN 3
Consider restarting lopressor today.
Continue Lopressor
95
Continue Lopressor
Lopressor BID

## 2023-05-24 NOTE — H&P PST ADULT - ATTENDING COMMENTS
Walk in
The patient is a very put 54-year-old gentleman. He reports a rather strong positive family history of early atherosclerotic coronary artery disease, and early mortality. The patient is a former light smoker. He is status post donor nephrectomy, the recipient being as mobile. The patient denies being diabetic, although his wife who is a diabetic reports that he is not diagnosed, and not treated. On admission, hemoglobin A1c level is greater than 8. He is therefore an untreated, and noncompliant diabetic. He has hypertension and hyperlipidemia, and is not tolerant of statins. He presents with crescendo anginal pattern. Cardiac catheterization revealed high-grade multivessel coronary disease with chronic total occlusion of the LAD and right coronary arteries. There is also a totally occluded first obtuse marginal artery. Coronary artery bypass grafting is clearly indicated. The patient's renal function will be monitored status post catheterization. Additional workup will include carotid duplex, echocardiogram, and pulmonary function tests. If there is any issue with creatinine following catheterization, nephrology consultation will be obtained. Endocrine consult will also be obtained secondary to elevated hemoglobin A1c level. Venous mapping of the bilateral lower tremor he is, as well as formal Dago's testing will be performed.    Coronary artery bypass grafting was discussed in detail the patient and wife.  Risks benefits and alternatives to coronary artery bypass grafting were discussed with the patient in detail.  Risks discussed included, but not limited to infection, bleeding, myocardial infarction, cerebrovascular accident, renal failure,  vascular injury requiring intervention, and death.  The patient fully understood and wishes to proceed.  All questions were answered to the patient's understanding and satisfaction.   Furthermore, the patient's STS score and its significance were discussed directly with the patient and family.  I would like to thank you for referring this patient to my attention and for allowing me to participate in his care.  If there are any further questions, or I can be of any further assistance, please do not hesitate contacting me at any time.